# Patient Record
Sex: FEMALE | Race: BLACK OR AFRICAN AMERICAN | NOT HISPANIC OR LATINO | Employment: UNEMPLOYED | URBAN - METROPOLITAN AREA
[De-identification: names, ages, dates, MRNs, and addresses within clinical notes are randomized per-mention and may not be internally consistent; named-entity substitution may affect disease eponyms.]

---

## 2021-04-27 RX ORDER — VALACYCLOVIR HYDROCHLORIDE 1 G/1
1000 TABLET, FILM COATED ORAL 2 TIMES DAILY
COMMUNITY
End: 2021-10-05 | Stop reason: ALTCHOICE

## 2021-04-29 ENCOUNTER — ANNUAL EXAM (OUTPATIENT)
Dept: OBGYN CLINIC | Facility: CLINIC | Age: 36
End: 2021-04-29
Payer: COMMERCIAL

## 2021-04-29 VITALS
DIASTOLIC BLOOD PRESSURE: 74 MMHG | HEIGHT: 65 IN | WEIGHT: 180 LBS | SYSTOLIC BLOOD PRESSURE: 124 MMHG | BODY MASS INDEX: 29.99 KG/M2

## 2021-04-29 DIAGNOSIS — R10.2 PELVIC PAIN: ICD-10-CM

## 2021-04-29 DIAGNOSIS — B37.2 YEAST INFECTION OF THE SKIN: ICD-10-CM

## 2021-04-29 DIAGNOSIS — B96.89 BV (BACTERIAL VAGINOSIS): ICD-10-CM

## 2021-04-29 DIAGNOSIS — Z11.3 SCREEN FOR STD (SEXUALLY TRANSMITTED DISEASE): ICD-10-CM

## 2021-04-29 DIAGNOSIS — N76.0 BV (BACTERIAL VAGINOSIS): ICD-10-CM

## 2021-04-29 DIAGNOSIS — Z01.411 ENCOUNTER FOR GYNECOLOGICAL EXAMINATION WITH ABNORMAL FINDING: Primary | ICD-10-CM

## 2021-04-29 PROCEDURE — 99395 PREV VISIT EST AGE 18-39: CPT | Performed by: NURSE PRACTITIONER

## 2021-04-29 RX ORDER — METRONIDAZOLE 500 MG/1
500 TABLET ORAL EVERY 12 HOURS SCHEDULED
Qty: 14 TABLET | Refills: 3 | Status: SHIPPED | OUTPATIENT
Start: 2021-04-29 | End: 2021-05-06

## 2021-04-29 RX ORDER — NYSTATIN 100000 U/G
CREAM TOPICAL 2 TIMES DAILY
Qty: 30 G | Refills: 0 | Status: SHIPPED | OUTPATIENT
Start: 2021-04-29 | End: 2021-10-28 | Stop reason: ALTCHOICE

## 2021-04-29 NOTE — PROGRESS NOTES
Assessment/Plan:         Diagnoses and all orders for this visit:    Encounter for gynecological examination with abnormal finding  Comments:  Pt with c/o bilat pelvic pain on BM exam  will order US  Orders:  -     Chlamydia/GC amplified DNA by PCR    Pelvic pain  -     US pelvis complete w transvaginal; Future    Yeast infection of the skin  Comments:  under breasts bilat  Orders:  -     nystatin (MYCOSTATIN) cream; Apply topically 2 (two) times a day for 10 days    BV (bacterial vaginosis)  -     metroNIDAZOLE (FLAGYL) 500 mg tablet; Take 1 tablet (500 mg total) by mouth every 12 (twelve) hours for 7 days    Screen for STD (sexually transmitted disease)  -     Chlamydia/GC amplified DNA by PCR    Other orders  -     valACYclovir (VALTREX) 1,000 mg tablet; Take 1,000 mg by mouth 2 (two) times a day          Subjective:      Patient ID: Odalis Campos is a 37 y o  female  Here for annual gyn exam   No issues  Periods regular normal   SA same partner No birth control as ok with pregnancy  Denies abdominal or pelvic pain  Bowel and bladder are normal  Would like G/c  C/o Itchy rash under breasts which comes and goes  Using unmedicated powder  The following portions of the patient's history were reviewed and updated as appropriate: allergies, current medications, past family history, past medical history, past social history, past surgical history and problem list     Review of Systems   Constitutional: Negative for fatigue and unexpected weight change  Gastrointestinal: Negative for abdominal distention, abdominal pain, constipation and diarrhea  Genitourinary: Negative for difficulty urinating, dyspareunia, dysuria, frequency, genital sores, menstrual problem, pelvic pain, urgency, vaginal bleeding and vaginal discharge  Skin: Positive for rash (under breast bilat)  Hematological: Negative for adenopathy  Psychiatric/Behavioral: Negative  Negative for dysphoric mood   The patient is not nervous/anxious  Objective:      /74   Ht 5' 5" (1 651 m)   Wt 81 6 kg (180 lb)   LMP 04/16/2021   BMI 29 95 kg/m²          Physical Exam  Vitals signs and nursing note reviewed  Constitutional:       General: She is not in acute distress  Appearance: Normal appearance  HENT:      Head: Normocephalic and atraumatic  Pulmonary:      Effort: Pulmonary effort is normal    Chest:      Breasts: Breasts are symmetrical          Right: Normal  No mass, nipple discharge, skin change or tenderness  Left: Normal  No mass, nipple discharge, skin change or tenderness  Abdominal:      General: There is no distension  Palpations: Abdomen is soft  Tenderness: There is no abdominal tenderness  There is no guarding or rebound  Genitourinary:     Exam position: Lithotomy position  Labia:         Right: No rash, tenderness, lesion or injury  Left: No rash, tenderness, lesion or injury  Urethra: No prolapse, urethral pain, urethral swelling or urethral lesion  Vagina: Normal  No erythema or lesions  Cervix: No cervical motion tenderness, discharge, lesion or cervical bleeding  Uterus: Normal        Adnexa:         Right: Tenderness present  No mass  Left: Tenderness present  No mass  Rectum: No external hemorrhoid  Comments: G/C obtained Pelvic tenderness bilat with bimanual exam  Musculoskeletal: Normal range of motion  Lymphadenopathy:      Upper Body:      Right upper body: No axillary adenopathy  Left upper body: No axillary adenopathy  Lower Body: No right inguinal adenopathy  No left inguinal adenopathy  Skin:     General: Skin is warm and dry  Neurological:      Mental Status: She is alert and oriented to person, place, and time  Psychiatric:         Mood and Affect: Mood normal          Behavior: Behavior normal          Thought Content:  Thought content normal          Judgment: Judgment normal

## 2021-05-01 LAB
C TRACH RRNA SPEC QL NAA+PROBE: NEGATIVE
N GONORRHOEA RRNA SPEC QL NAA+PROBE: NEGATIVE

## 2021-05-02 PROBLEM — N76.0 BV (BACTERIAL VAGINOSIS): Status: ACTIVE | Noted: 2021-05-02

## 2021-05-02 PROBLEM — B96.89 BV (BACTERIAL VAGINOSIS): Status: ACTIVE | Noted: 2021-05-02

## 2021-05-02 NOTE — PATIENT INSTRUCTIONS
H/o recurrent BV requested Flagyl to have on hand  Rx sent to requested pharmacy  No sex during treatment  Complete all medication as directed  Consider daily probiotic  Bilat tenderness on BM exam  Will check US to r/o cyst call with cont or worsening sx  Yeast infection under breasts  Rec dry area well  Dry with blow dryer on cool stteing then apply nystatin bid  Consider antiperspirant daily in AM under breasts to decrease sweating

## 2021-07-16 ENCOUNTER — TELEPHONE (OUTPATIENT)
Dept: OBGYN CLINIC | Facility: CLINIC | Age: 36
End: 2021-07-16

## 2021-07-16 DIAGNOSIS — O20.9 BLEEDING IN EARLY PREGNANCY: Primary | ICD-10-CM

## 2021-07-16 NOTE — TELEPHONE ENCOUNTER
Patient called the emergency line stating she is currently pregnant (LMP 21; ; currently 5 wks; +UPT at [facility] 07/15/21)  She noticed brown spotting yesterday and then today bright red bleeding (not heavy) enough to notice on a pad with tiny clots  She denies abd pain/cramping  She believe she having a miscarriage and unsure to wait it out or come in to be seen  Advised pt will call on-call provider Dr Jillian Moss for recommendations and will call her back  Spoke with Dr Jillian Moss and she recommends beta HCGs one today and repeat in 48 hours as well as blood typing  Called patient and she states she uses [facility]  Order placed and transmitted to [facility] per pt request  Provided pt ectopic precautions and if heavy bleeding or severe abd pain/cramp to proceed to ER  She states after this nurse review ectopic precaution she did had a history of 1 ectopic before and 1 termination previously  She verbalized understanding with the recommendations

## 2021-07-17 LAB
ABO GROUP BLD: NORMAL
HCG INTACT+B SERPL-ACNC: 66 MIU/ML
RH BLD: POSITIVE

## 2021-07-19 NOTE — TELEPHONE ENCOUNTER
Patient called today to follow-up as she end up going to [facility] ER on Friday night  The ER did ultrasound and blood work and diagnosed her with missed ab and instructed her to follow-up with her OBGYN today  She denies cramping and the bleeding stopped  She was instructed to repeat HCG repeat in 48 hours which [facility] [address] her depending on the results will call her with the next plan of care  If her bleeding returns and heavy or severe abd pain to please proceed back to the ER  She voiced appreciation  ER records rec'd and sent to scan into patient's chart

## 2021-07-20 DIAGNOSIS — O20.9 BLEEDING IN EARLY PREGNANCY: Primary | ICD-10-CM

## 2021-07-20 LAB — HCG INTACT+B SERPL-ACNC: 28 MIU/ML

## 2021-07-20 NOTE — PROGRESS NOTES
Please check repeated HCG lab encounter   Order for another HCG to be repeated transmitted to [facility]

## 2021-07-29 LAB — HCG INTACT+B SERPL-ACNC: <1 MIU/ML

## 2021-10-05 ENCOUNTER — OFFICE VISIT (OUTPATIENT)
Dept: OBGYN CLINIC | Facility: CLINIC | Age: 36
End: 2021-10-05
Payer: COMMERCIAL

## 2021-10-05 VITALS
SYSTOLIC BLOOD PRESSURE: 102 MMHG | WEIGHT: 179 LBS | BODY MASS INDEX: 29.82 KG/M2 | DIASTOLIC BLOOD PRESSURE: 62 MMHG | HEIGHT: 65 IN

## 2021-10-05 DIAGNOSIS — Z20.2 POSSIBLE EXPOSURE TO STD: ICD-10-CM

## 2021-10-05 DIAGNOSIS — N93.0 PCB (POST COITAL BLEEDING): Primary | ICD-10-CM

## 2021-10-05 DIAGNOSIS — B37.3 YEAST VAGINITIS: ICD-10-CM

## 2021-10-05 DIAGNOSIS — R10.2 PELVIC PAIN: ICD-10-CM

## 2021-10-05 LAB
BV WHIFF TEST VAG QL: ABNORMAL
CLUE CELLS SPEC QL WET PREP: ABNORMAL
PH SMN: 5 [PH]
SL AMB POCT WET MOUNT: ABNORMAL
T VAGINALIS VAG QL WET PREP: ABNORMAL
YEAST VAG QL WET PREP: ABNORMAL

## 2021-10-05 PROCEDURE — 87210 SMEAR WET MOUNT SALINE/INK: CPT | Performed by: NURSE PRACTITIONER

## 2021-10-05 PROCEDURE — 99213 OFFICE O/P EST LOW 20 MIN: CPT | Performed by: NURSE PRACTITIONER

## 2021-10-05 RX ORDER — FLUCONAZOLE 150 MG/1
150 TABLET ORAL ONCE
Qty: 2 TABLET | Refills: 0 | Status: SHIPPED | OUTPATIENT
Start: 2021-10-05 | End: 2021-10-05

## 2021-10-08 LAB
C TRACH RRNA SPEC QL NAA+PROBE: NEGATIVE
N GONORRHOEA RRNA SPEC QL NAA+PROBE: NEGATIVE
T VAGINALIS RRNA SPEC QL NAA+PROBE: NEGATIVE

## 2021-10-20 ENCOUNTER — TELEPHONE (OUTPATIENT)
Dept: OBGYN CLINIC | Facility: CLINIC | Age: 36
End: 2021-10-20

## 2021-10-28 ENCOUNTER — OFFICE VISIT (OUTPATIENT)
Dept: OBGYN CLINIC | Facility: CLINIC | Age: 36
End: 2021-10-28
Payer: COMMERCIAL

## 2021-10-28 VITALS
WEIGHT: 178 LBS | DIASTOLIC BLOOD PRESSURE: 60 MMHG | BODY MASS INDEX: 30.39 KG/M2 | SYSTOLIC BLOOD PRESSURE: 100 MMHG | HEIGHT: 64 IN

## 2021-10-28 DIAGNOSIS — Z3A.01 LESS THAN 8 WEEKS GESTATION OF PREGNANCY: Primary | ICD-10-CM

## 2021-10-28 DIAGNOSIS — Z36.87 UNSURE OF LMP (LAST MENSTRUAL PERIOD) AS REASON FOR ULTRASOUND SCAN: ICD-10-CM

## 2021-10-28 PROCEDURE — 99214 OFFICE O/P EST MOD 30 MIN: CPT | Performed by: OBSTETRICS & GYNECOLOGY

## 2021-10-28 PROCEDURE — 76817 TRANSVAGINAL US OBSTETRIC: CPT | Performed by: OBSTETRICS & GYNECOLOGY

## 2021-11-10 ENCOUNTER — INITIAL PRENATAL (OUTPATIENT)
Dept: OBGYN CLINIC | Facility: CLINIC | Age: 36
End: 2021-11-10
Payer: COMMERCIAL

## 2021-11-10 VITALS
HEIGHT: 65 IN | WEIGHT: 180 LBS | SYSTOLIC BLOOD PRESSURE: 108 MMHG | DIASTOLIC BLOOD PRESSURE: 62 MMHG | BODY MASS INDEX: 29.99 KG/M2

## 2021-11-10 DIAGNOSIS — Z34.91 FIRST TRIMESTER PREGNANCY: Primary | ICD-10-CM

## 2021-11-10 DIAGNOSIS — Z3A.08 8 WEEKS GESTATION OF PREGNANCY: ICD-10-CM

## 2021-11-10 DIAGNOSIS — Z13.0 SCREENING FOR SICKLE-CELL DISEASE OR TRAIT: ICD-10-CM

## 2021-11-10 DIAGNOSIS — Z3A.08 8 WEEKS GESTATION OF PREGNANCY: Primary | ICD-10-CM

## 2021-11-10 DIAGNOSIS — O34.211 MATERNAL CARE DUE TO LOW TRANSVERSE UTERINE SCAR FROM PREVIOUS CESAREAN DELIVERY: ICD-10-CM

## 2021-11-10 DIAGNOSIS — B00.9 HERPES SIMPLEX: ICD-10-CM

## 2021-11-10 PROBLEM — Z3A.01 LESS THAN 8 WEEKS GESTATION OF PREGNANCY: Status: ACTIVE | Noted: 2021-10-28

## 2021-11-10 LAB
SL AMB  POCT GLUCOSE, UA: NORMAL
SL AMB POCT URINE PROTEIN: NORMAL

## 2021-11-10 PROCEDURE — 76817 TRANSVAGINAL US OBSTETRIC: CPT | Performed by: STUDENT IN AN ORGANIZED HEALTH CARE EDUCATION/TRAINING PROGRAM

## 2021-11-10 PROCEDURE — 99214 OFFICE O/P EST MOD 30 MIN: CPT | Performed by: STUDENT IN AN ORGANIZED HEALTH CARE EDUCATION/TRAINING PROGRAM

## 2021-11-10 PROCEDURE — OBC: Performed by: STUDENT IN AN ORGANIZED HEALTH CARE EDUCATION/TRAINING PROGRAM

## 2021-11-12 LAB
C TRACH RRNA SPEC QL NAA+PROBE: NEGATIVE
N GONORRHOEA RRNA SPEC QL NAA+PROBE: NEGATIVE

## 2021-12-09 ENCOUNTER — ROUTINE PRENATAL (OUTPATIENT)
Dept: OBGYN CLINIC | Facility: CLINIC | Age: 36
End: 2021-12-09
Payer: COMMERCIAL

## 2021-12-09 VITALS
BODY MASS INDEX: 30.32 KG/M2 | WEIGHT: 182 LBS | DIASTOLIC BLOOD PRESSURE: 74 MMHG | SYSTOLIC BLOOD PRESSURE: 114 MMHG | HEIGHT: 65 IN

## 2021-12-09 DIAGNOSIS — O34.211 MATERNAL CARE DUE TO LOW TRANSVERSE UTERINE SCAR FROM PREVIOUS CESAREAN DELIVERY: Primary | ICD-10-CM

## 2021-12-09 DIAGNOSIS — Z3A.12 12 WEEKS GESTATION OF PREGNANCY: ICD-10-CM

## 2021-12-09 LAB
SL AMB  POCT GLUCOSE, UA: ABNORMAL
SL AMB POCT URINE PROTEIN: ABNORMAL

## 2021-12-09 PROCEDURE — 99213 OFFICE O/P EST LOW 20 MIN: CPT | Performed by: OBSTETRICS & GYNECOLOGY

## 2021-12-10 ENCOUNTER — ROUTINE PRENATAL (OUTPATIENT)
Dept: PERINATAL CARE | Facility: OTHER | Age: 36
End: 2021-12-10
Payer: COMMERCIAL

## 2021-12-10 VITALS
SYSTOLIC BLOOD PRESSURE: 122 MMHG | DIASTOLIC BLOOD PRESSURE: 61 MMHG | WEIGHT: 184.6 LBS | HEIGHT: 65 IN | BODY MASS INDEX: 30.75 KG/M2 | HEART RATE: 88 BPM

## 2021-12-10 DIAGNOSIS — Z3A.12 12 WEEKS GESTATION OF PREGNANCY: ICD-10-CM

## 2021-12-10 DIAGNOSIS — Z36.82 ENCOUNTER FOR NUCHAL TRANSLUCENCY TESTING: Primary | ICD-10-CM

## 2021-12-10 DIAGNOSIS — Z34.91 FIRST TRIMESTER PREGNANCY: ICD-10-CM

## 2021-12-10 DIAGNOSIS — O34.211 MATERNAL CARE DUE TO LOW TRANSVERSE UTERINE SCAR FROM PREVIOUS CESAREAN DELIVERY: ICD-10-CM

## 2021-12-10 PROCEDURE — 76801 OB US < 14 WKS SINGLE FETUS: CPT | Performed by: OBSTETRICS & GYNECOLOGY

## 2021-12-10 PROCEDURE — 76813 OB US NUCHAL MEAS 1 GEST: CPT | Performed by: OBSTETRICS & GYNECOLOGY

## 2021-12-10 PROCEDURE — 99241 PR OFFICE CONSULTATION NEW/ESTAB PATIENT 15 MIN: CPT | Performed by: OBSTETRICS & GYNECOLOGY

## 2021-12-15 ENCOUNTER — TELEPHONE (OUTPATIENT)
Dept: PERINATAL CARE | Facility: CLINIC | Age: 36
End: 2021-12-15

## 2021-12-15 LAB
EXTERNAL ANTIBODY SCREEN: NORMAL
EXTERNAL HEMOGLOBIN: 9.7 G/DL
EXTERNAL HEPATITIS B SURFACE ANTIGEN: NEGATIVE
EXTERNAL HIV-1/2 AB-AG: NORMAL
EXTERNAL PLATELET COUNT: 275 K/ΜL
EXTERNAL RH FACTOR: POSITIVE
EXTERNAL RUBELLA IGG QUANTITATION: NORMAL
EXTERNAL SYPHILIS RPR SCREEN: NORMAL

## 2021-12-16 LAB
AMPHETAMINES UR QL SCN: NEGATIVE NG/ML
BARBITURATES UR QL SCN: NEGATIVE NG/ML
BENZODIAZ UR QL: NEGATIVE NG/ML
BZE UR QL: NEGATIVE NG/ML
CANNABINOIDS UR QL SCN: NEGATIVE NG/ML
METHADONE UR QL SCN: NEGATIVE NG/ML
OPIATES UR QL: NEGATIVE NG/ML
PCP UR QL: NEGATIVE NG/ML
PROPOXYPH UR QL SCN: NEGATIVE NG/ML

## 2021-12-17 ENCOUNTER — TELEPHONE (OUTPATIENT)
Dept: OBGYN CLINIC | Facility: CLINIC | Age: 36
End: 2021-12-17

## 2021-12-17 PROBLEM — O99.019 ANTEPARTUM ANEMIA: Status: ACTIVE | Noted: 2021-12-17

## 2021-12-17 LAB
ABO GROUP BLD: ABNORMAL
APPEARANCE UR: CLEAR
BACTERIA UR CULT: NORMAL
BACTERIA URNS QL MICRO: ABNORMAL
BASOPHILS # BLD AUTO: 0 X10E3/UL (ref 0–0.2)
BASOPHILS NFR BLD AUTO: 0 %
BILIRUB UR QL STRIP: NEGATIVE
BLD GP AB SCN SERPL QL: NEGATIVE
CASTS URNS QL MICRO: ABNORMAL /LPF
COLOR UR: YELLOW
EOSINOPHIL # BLD AUTO: 0 X10E3/UL (ref 0–0.4)
EOSINOPHIL NFR BLD AUTO: 1 %
EPI CELLS #/AREA URNS HPF: ABNORMAL /HPF (ref 0–10)
ERYTHROCYTE [DISTWIDTH] IN BLOOD BY AUTOMATED COUNT: 12.7 % (ref 11.7–15.4)
GLUCOSE UR QL: ABNORMAL
HBV SURFACE AG SERPL QL IA: NEGATIVE
HCT VFR BLD AUTO: 28 % (ref 34–46.6)
HCV AB S/CO SERPL IA: <0.1 S/CO RATIO (ref 0–0.9)
HGB A MFR BLD: 2.4 % (ref 1.8–3.2)
HGB A MFR BLD: 97.6 % (ref 96.4–98.8)
HGB BLD-MCNC: 9.7 G/DL (ref 11.1–15.9)
HGB F MFR BLD: 0 % (ref 0–2)
HGB FRACT BLD-IMP: NORMAL
HGB S MFR BLD: 0 %
HGB UR QL STRIP: NEGATIVE
HIV 1+2 AB+HIV1 P24 AG SERPL QL IA: NON REACTIVE
IMM GRANULOCYTES # BLD: 0.1 X10E3/UL (ref 0–0.1)
IMM GRANULOCYTES NFR BLD: 1 %
KETONES UR QL STRIP: NEGATIVE
LEUKOCYTE ESTERASE UR QL STRIP: ABNORMAL
LYMPHOCYTES # BLD AUTO: 1.6 X10E3/UL (ref 0.7–3.1)
LYMPHOCYTES NFR BLD AUTO: 25 %
Lab: NO GROWTH
MCH RBC QN AUTO: 28.2 PG (ref 26.6–33)
MCHC RBC AUTO-ENTMCNC: 34.6 G/DL (ref 31.5–35.7)
MCV RBC AUTO: 81 FL (ref 79–97)
MICRO URNS: ABNORMAL
MONOCYTES # BLD AUTO: 0.4 X10E3/UL (ref 0.1–0.9)
MONOCYTES NFR BLD AUTO: 6 %
NEUTROPHILS # BLD AUTO: 4.4 X10E3/UL (ref 1.4–7)
NEUTROPHILS NFR BLD AUTO: 67 %
NITRITE UR QL STRIP: NEGATIVE
PH UR STRIP: 6.5 [PH] (ref 5–7.5)
PLATELET # BLD AUTO: 275 X10E3/UL (ref 150–450)
PROT UR QL STRIP: NEGATIVE
RBC # BLD AUTO: 3.44 X10E6/UL (ref 3.77–5.28)
RBC #/AREA URNS HPF: ABNORMAL /HPF (ref 0–2)
RH BLD: POSITIVE
RPR SER QL: NON REACTIVE
RUBV IGG SERPL IA-ACNC: 1.19 INDEX
SP GR UR: 1.01 (ref 1–1.03)
UROBILINOGEN UR STRIP-ACNC: 0.2 MG/DL (ref 0.2–1)
WBC # BLD AUTO: 6.5 X10E3/UL (ref 3.4–10.8)
WBC #/AREA URNS HPF: ABNORMAL /HPF (ref 0–5)

## 2021-12-22 ENCOUNTER — ROUTINE PRENATAL (OUTPATIENT)
Dept: OBGYN CLINIC | Facility: CLINIC | Age: 36
End: 2021-12-22
Payer: COMMERCIAL

## 2021-12-22 ENCOUNTER — TELEPHONE (OUTPATIENT)
Dept: OBGYN CLINIC | Facility: CLINIC | Age: 36
End: 2021-12-22

## 2021-12-22 VITALS — SYSTOLIC BLOOD PRESSURE: 126 MMHG | WEIGHT: 185 LBS | BODY MASS INDEX: 30.79 KG/M2 | DIASTOLIC BLOOD PRESSURE: 60 MMHG

## 2021-12-22 DIAGNOSIS — Z3A.14 14 WEEKS GESTATION OF PREGNANCY: ICD-10-CM

## 2021-12-22 DIAGNOSIS — O99.012 ANEMIA DURING PREGNANCY IN SECOND TRIMESTER: ICD-10-CM

## 2021-12-22 DIAGNOSIS — O34.211 MATERNAL CARE DUE TO LOW TRANSVERSE UTERINE SCAR FROM PREVIOUS CESAREAN DELIVERY: Primary | ICD-10-CM

## 2021-12-22 LAB
SL AMB  POCT GLUCOSE, UA: ABNORMAL
SL AMB POCT URINE PROTEIN: NEGATIVE

## 2021-12-22 PROCEDURE — 99213 OFFICE O/P EST LOW 20 MIN: CPT | Performed by: OBSTETRICS & GYNECOLOGY

## 2021-12-22 RX ORDER — PNV NO.95/FERROUS FUM/FOLIC AC 28MG-0.8MG
TABLET ORAL
COMMUNITY
End: 2022-05-03

## 2022-01-11 ENCOUNTER — ROUTINE PRENATAL (OUTPATIENT)
Dept: OBGYN CLINIC | Facility: CLINIC | Age: 37
End: 2022-01-11
Payer: COMMERCIAL

## 2022-01-11 VITALS — WEIGHT: 183 LBS | SYSTOLIC BLOOD PRESSURE: 110 MMHG | BODY MASS INDEX: 30.45 KG/M2 | DIASTOLIC BLOOD PRESSURE: 62 MMHG

## 2022-01-11 DIAGNOSIS — O99.019 ANTEPARTUM ANEMIA: ICD-10-CM

## 2022-01-11 DIAGNOSIS — B00.9 HERPES SIMPLEX: ICD-10-CM

## 2022-01-11 DIAGNOSIS — Z36.1 NEED FOR MATERNAL SERUM ALPHA-PROTEIN (MSAFP) SCREENING: ICD-10-CM

## 2022-01-11 DIAGNOSIS — Z3A.17 17 WEEKS GESTATION OF PREGNANCY: ICD-10-CM

## 2022-01-11 DIAGNOSIS — Z71.85 VACCINE COUNSELING: ICD-10-CM

## 2022-01-11 DIAGNOSIS — O34.211 MATERNAL CARE DUE TO LOW TRANSVERSE UTERINE SCAR FROM PREVIOUS CESAREAN DELIVERY: Primary | ICD-10-CM

## 2022-01-11 DIAGNOSIS — Z98.891 HISTORY OF VBAC: ICD-10-CM

## 2022-01-11 LAB
SL AMB  POCT GLUCOSE, UA: NEGATIVE
SL AMB POCT URINE PROTEIN: NEGATIVE

## 2022-01-11 PROCEDURE — 99213 OFFICE O/P EST LOW 20 MIN: CPT | Performed by: OBSTETRICS & GYNECOLOGY

## 2022-01-11 NOTE — PROGRESS NOTES
Routine Prenatal Visit  [address], [address], [facility], [address]    Assessment/Plan:  Jose L Stacy is a 31y o  year old [id number] at 17w3d who presents for routine prenatal visit  1  Maternal care due to low transverse uterine scar from previous  delivery  Assessment & Plan:  H/o successful , planning SHAHRZAD this delivery as well      2  Herpes simplex  Assessment & Plan:  Questionable h/o outbreak  Recom Valtrex at 36 weeks for suppression just to be sure  3  Antepartum anemia  Assessment & Plan:  Taking po iron      4  Need for maternal serum alpha-protein (MSAFP) screening  -     Alpha fetoprotein, maternal; Future  -     Alpha fetoprotein, maternal    5  17 weeks gestation of pregnancy  -     POCT urine dip    6  History of     7  Vaccine counseling  Comments:  received Moderna Covid vaccine , has 2nd scheduled       Next OB Visit 4 weeks  Subjective:     CC: Prenatal care    Debra Sahni is a 31 y o  [id number] female who presents for routine prenatal care at 17w3d  Pregnancy ROS: no leakage of fluid, pelvic pain, or vaginal bleeding  normal fetal movement      The following portions of the patient's history were reviewed and updated as appropriate: allergies, current medications, past family history, past medical history, obstetric history, gynecologic history, past social history, past surgical history and problem list       Objective:  /62   Wt 83 kg (183 lb)   LMP 2021 (Exact Date)   BMI 30 45 kg/m²   Pregravid Weight/BMI: 80 7 kg (178 lb) (BMI 29 62)  Current Weight: 83 kg (183 lb)   Total Weight Gain: 2 268 kg (5 lb)   Pre-Jade Vitals      Most Recent Value   Prenatal Assessment    Fetal Heart Rate 155   Fundal Height (cm) 17 cm   Movement Present   Prenatal Vitals    Blood Pressure 110/62   Weight - Scale 83 kg (183 lb)   Urine Albumin/Glucose    Dilation/Effacement/Station    Vaginal Drainage    Edema    LLE Edema None   RLE Edema None           General: Well appearing, no distress  Abdomen: Soft, gravid, nontender  Fundal Height: Appropriate for gestational age  Extremities: Warm and well perfused  Non tender

## 2022-01-31 ENCOUNTER — ROUTINE PRENATAL (OUTPATIENT)
Dept: PERINATAL CARE | Facility: OTHER | Age: 37
End: 2022-01-31
Payer: COMMERCIAL

## 2022-01-31 VITALS
BODY MASS INDEX: 31.59 KG/M2 | SYSTOLIC BLOOD PRESSURE: 122 MMHG | HEIGHT: 65 IN | WEIGHT: 189.6 LBS | HEART RATE: 95 BPM | DIASTOLIC BLOOD PRESSURE: 69 MMHG

## 2022-01-31 DIAGNOSIS — Z36.86 ENCOUNTER FOR ANTENATAL SCREENING FOR CERVICAL LENGTH: ICD-10-CM

## 2022-01-31 DIAGNOSIS — O99.210 OBESITY AFFECTING PREGNANCY, ANTEPARTUM: Primary | ICD-10-CM

## 2022-01-31 DIAGNOSIS — Z3A.20 20 WEEKS GESTATION OF PREGNANCY: ICD-10-CM

## 2022-01-31 PROCEDURE — 76811 OB US DETAILED SNGL FETUS: CPT | Performed by: OBSTETRICS & GYNECOLOGY

## 2022-01-31 PROCEDURE — 76817 TRANSVAGINAL US OBSTETRIC: CPT | Performed by: OBSTETRICS & GYNECOLOGY

## 2022-01-31 PROCEDURE — 99213 OFFICE O/P EST LOW 20 MIN: CPT | Performed by: OBSTETRICS & GYNECOLOGY

## 2022-01-31 NOTE — PROGRESS NOTES
The patient was seen today for an ultrasound  Please see ultrasound report (located under Ob Procedures) for additional details  Thank you very much for allowing us to participate in the care of this very nice patient  Should you have any questions, please do not hesitate to contact me  MD [place]  Attending Physician, [place]hem[place]

## 2022-01-31 NOTE — PROGRESS NOTES
Ultrasound Probe Disinfection    A transvaginal ultrasound was performed  Prior to use, disinfection was performed with High Level Disinfection Process ([employer])  Probe serial number [id number] was used        Garrett Light  01/31/22  10:51 AM

## 2022-02-02 PROBLEM — O99.012 ANEMIA DURING PREGNANCY IN SECOND TRIMESTER: Status: ACTIVE | Noted: 2021-12-17

## 2022-02-02 NOTE — PROGRESS NOTES
Routine Prenatal Visit  [address] [facility], [address], [facility], [address]    Assessment/Plan:  Jet Nash is a 31y o  year old [id number] at 14w4d who presents for routine prenatal visit  1  Maternal care due to low transverse uterine scar from previous  delivery    2  Anemia during pregnancy in second trimester    3  14 weeks gestation of pregnancy  -     POCT urine dip          Subjective:     CC: Prenatal care    Clay Gary is a 31 y o  [id number] female who presents for routine prenatal care at 14w4d  Pregnancy ROS: no leakage of fluid, pelvic pain, or vaginal bleeding  no fetal movement  The following portions of the patient's history were reviewed and updated as appropriate: allergies, current medications, past family history, past medical history, obstetric history, gynecologic history, past social history, past surgical history and problem list       Objective:  /60   Wt 83 9 kg (185 lb)   LMP 2021 (Exact Date)   BMI 30 79 kg/m²   Pregravid Weight/BMI: 80 7 kg (178 lb) (BMI 29 62)  Current Weight: 83 9 kg (185 lb)   Total Weight Gain: 3 175 kg (7 lb)   Pre- Vitals      Most Recent Value   Prenatal Assessment    Fetal Heart Rate 150   Fundal Height (cm) 15 cm   Movement Absent   Prenatal Vitals    Blood Pressure 126/60   Weight - Scale 83 9 kg (185 lb)   Urine Albumin/Glucose    Dilation/Effacement/Station    Vaginal Drainage    Edema            General: Well appearing, no distress  Respiratory: Unlabored breathing  Cardiovascular: Regular rate  Abdomen: Soft, gravid, nontender  Fundal Height: Appropriate for gestational age  Extremities: Warm and well perfused  Non tender

## 2022-02-08 ENCOUNTER — ROUTINE PRENATAL (OUTPATIENT)
Dept: OBGYN CLINIC | Facility: CLINIC | Age: 37
End: 2022-02-08
Payer: COMMERCIAL

## 2022-02-08 VITALS — DIASTOLIC BLOOD PRESSURE: 48 MMHG | WEIGHT: 187.2 LBS | SYSTOLIC BLOOD PRESSURE: 100 MMHG | BODY MASS INDEX: 31.15 KG/M2

## 2022-02-08 DIAGNOSIS — B00.9 HERPES SIMPLEX: ICD-10-CM

## 2022-02-08 DIAGNOSIS — O34.211 MATERNAL CARE DUE TO LOW TRANSVERSE UTERINE SCAR FROM PREVIOUS CESAREAN DELIVERY: ICD-10-CM

## 2022-02-08 DIAGNOSIS — Z3A.21 21 WEEKS GESTATION OF PREGNANCY: ICD-10-CM

## 2022-02-08 DIAGNOSIS — Z98.891 HISTORY OF VBAC: ICD-10-CM

## 2022-02-08 DIAGNOSIS — O99.012 ANEMIA DURING PREGNANCY IN SECOND TRIMESTER: Primary | ICD-10-CM

## 2022-02-08 LAB
SL AMB  POCT GLUCOSE, UA: NEGATIVE
SL AMB POCT URINE PROTEIN: NEGATIVE

## 2022-02-08 PROCEDURE — 99213 OFFICE O/P EST LOW 20 MIN: CPT | Performed by: OBSTETRICS & GYNECOLOGY

## 2022-02-08 NOTE — PATIENT INSTRUCTIONS
NUTRITION IN PREGNANCY  Good Nutrition is a VERY important part of having a healthy pregnancy and healthy baby  You should follow a healthy diet which include the following: * Vegetables (which are dark green and leafy): at least 2 servings each day   * Protein (meat, eggs, beans, nuts, peanut butter): 3-4 servings each day   * Breads/whole grains (bread, pasta, rice, tortillas, potatoes): 3 servings each day   * Dairy (milk, yogurt, cheese): 3-4 servings each day   * Water: 6-8 glasses per day   * Calories: approximately 2000 to 2200 calories per day     WEIGHT GAIN   Recommended weight gain for you during your pregnancy is based on your body mass index (BMI) at the time that you became pregnant  Pre-pregnant BMI Recommended weight gain   Underweight (BMI less than 18 5) 28 to 40 pounds   Normal weight (BMI 18 5-24 9) 25 to 35 pounds   Overweight (BMI 25-29 9) 15 to 25 pounds   Obese (BMI 30 or greater) 11 to 20 pounds     FOOD SAFETY   It is VERY important to eat only safely-prepared foods during pregnancy as you and your baby have a higher risk than usual for being affected by foodborne illnesses    Follow these steps to ensure that you and your baby are safe from foodborne illnesses while you are pregnant:   · wash hands thoroughly with warm water and soap before and after handling any foods   · wash cutting boards, dishes, utensils, and countertops with hot water and soap before and after preparing any foods   · rinse raw fruits and vegetables thoroughly under running water before eating   · keep raw meat and seafood separate from other foods and use different cutting boards/utensils to handle raw meat than for other foods   · put cooked food on a freshly clean plate   · cook all of your foods thoroughly   · discard foods that have been left out for more than 2 hours   · refrigerate or freeze any foods than can spoil     There are three particular foodborne risks that you should be aware of and avoid as they can cause serious harm to your unborn child  * Listeria (a harmful bacteria)   · dont eat hot dogs or deli meats (unless theyre reheated until steaming hot)   · dont eat soft cheeses (such as Feta, Brie, Camembert) unless they are specifically labeled as being made with pasteurized milk   · dont drink raw (unpasteurized) milk   · dont eat refrigerated pates or meat spreads   · dont eat refrigerated smoked seafood unless its in a cooked dish like a casserole     * Mercury (a metal which is found in certain fish in high levels)   · dont eat shark, tilefish, kelsey mackerel, or swordfish   · dont eat more than 12 ounces per week of shrimp, salmon, pollock, or catfish   · when eating tuna fish, you can have up to 6 ounces per week of canned albacore tuna OR up to 12 ounces of canned light tuna     * Toxoplasma (a harmful parasite)   · cook meat thoroughly before eating   · wear gloves when gardening or handling sand from a childs sandbox   · if you ha tve a cat, have someone else change the litter box while you are pregnant  · if you HAVE to clean it yourself, be sure to wash your hands thoroughly afterwards with warm water and soap     · dont get a NEW cat while you are pregnant

## 2022-02-08 NOTE — PROGRESS NOTES
Routine Prenatal Visit  [address], [address], [facility], [address] [facility] [facility] [address]    Assessment/Plan:  Deshawn Godinez is a 31y o  year old [id number] at 21w3d who presents for routine prenatal visit  1  21 weeks gestation of pregnancy  -     POCT urine dip    2  Anemia during pregnancy in second trimester    3  Maternal care due to low transverse uterine scar from previous  delivery    4  Herpes simplex    5  History of       + fm  No  UTCX denies  Bleeding    + using one Iron tablet a day  No constipation  20 week  US reviewed  AGA  Repeat at  32 weeks for missed anatomy  Subjective:     CC: Prenatal care    Debra Mcclure is a 31 y o  [id number] female who presents for routine prenatal care at 21w3d  Pregnancy ROS: no  leakage of fluid, pelvic pain, or vaginal bleeding   + fetal movement  The following portions of the patient's history were reviewed and updated as appropriate: allergies, current medications, past family history, past medical history, obstetric history, gynecologic history, past social history, past surgical history and problem list       Objective:  BP (!) 100/48   Wt 84 9 kg (187 lb 3 2 oz)   LMP 2021 (Exact Date)   BMI 31 15 kg/m²   Pregravid Weight/BMI: 80 7 kg (178 lb) (BMI 29 62)  Current Weight: 84 9 kg (187 lb 3 2 oz)   Total Weight Gain: 4 173 kg (9 lb 3 2 oz)   Pre- Vitals      Most Recent Value   Prenatal Assessment    Movement Present   Prenatal Vitals    Blood Pressure 100/48   Weight - Scale 84 9 kg (187 lb 3 2 oz)   Urine Albumin/Glucose    Dilation/Effacement/Station    Vaginal Drainage    Edema            General: Well appearing, no distress  Respiratory: Unlabored breathing  Cardiovascular: Regular rate  Abdomen: Soft, gravid, nontender  Fundal Height: Appropriate for gestational age  Extremities: Warm and well perfused  Non tender

## 2022-03-07 ENCOUNTER — ROUTINE PRENATAL (OUTPATIENT)
Dept: OBGYN CLINIC | Facility: CLINIC | Age: 37
End: 2022-03-07
Payer: COMMERCIAL

## 2022-03-07 VITALS
WEIGHT: 187 LBS | DIASTOLIC BLOOD PRESSURE: 60 MMHG | HEIGHT: 65 IN | SYSTOLIC BLOOD PRESSURE: 110 MMHG | BODY MASS INDEX: 31.16 KG/M2

## 2022-03-07 DIAGNOSIS — Z98.891 HISTORY OF VBAC: ICD-10-CM

## 2022-03-07 DIAGNOSIS — O34.211 MATERNAL CARE DUE TO LOW TRANSVERSE UTERINE SCAR FROM PREVIOUS CESAREAN DELIVERY: ICD-10-CM

## 2022-03-07 DIAGNOSIS — Z3A.25 25 WEEKS GESTATION OF PREGNANCY: ICD-10-CM

## 2022-03-07 DIAGNOSIS — Z36.89 ENCOUNTER FOR OTHER SPECIFIED ANTENATAL SCREENING: Primary | ICD-10-CM

## 2022-03-07 DIAGNOSIS — O99.012 ANEMIA DURING PREGNANCY IN SECOND TRIMESTER: ICD-10-CM

## 2022-03-07 LAB
SL AMB  POCT GLUCOSE, UA: NORMAL
SL AMB POCT URINE PROTEIN: NORMAL

## 2022-03-07 PROCEDURE — 99213 OFFICE O/P EST LOW 20 MIN: CPT | Performed by: OBSTETRICS & GYNECOLOGY

## 2022-03-07 RX ORDER — KETOCONAZOLE 20 MG/ML
SHAMPOO TOPICAL
COMMUNITY
Start: 2022-02-17

## 2022-03-07 NOTE — PROGRESS NOTES
Routine Prenatal Visit  [address], [address], [facility], [address]    Assessment/Plan:  Kenn Jenkins is a 31y o  year old [id number] at 25w2d who presents for routine prenatal visit  1  Encounter for other specified  screening  -     Glucose, 1H PG; Future  -     CBC; Future  -     RPR, Rfx Qn RPR/Confirm TP; Future  -     Glucose, 1H PG  -     CBC  -     RPR, Rfx Qn RPR/Confirm TP    2  25 weeks gestation of pregnancy  -     POCT urine dip    3  History of     4  Anemia during pregnancy in second trimester    5  Maternal care due to low transverse uterine scar from previous  delivery        Next OB Visit 4 weeks  Subjective:     CC: Prenatal care    Debra Patel is a 31 y o  [id number] female who presents for routine prenatal care at 25w2d  Pregnancy ROS: no leakage of fluid, pelvic pain, or vaginal bleeding  nml fetal movement      The following portions of the patient's history were reviewed and updated as appropriate: allergies, current medications, past family history, past medical history, obstetric history, gynecologic history, past social history, past surgical history and problem list       Objective:  /60 (BP Location: Left arm, Patient Position: Sitting, Cuff Size: Standard)   Ht 5' 5" (1 651 m)   Wt 84 8 kg (187 lb)   LMP 2021 (Exact Date)   BMI 31 12 kg/m²   Pregravid Weight/BMI: 80 7 kg (178 lb) (BMI 29 62)  Current Weight: 84 8 kg (187 lb)   Total Weight Gain: 4 082 kg (9 lb)   Pre-Jade Vitals      Most Recent Value   Prenatal Assessment    Fetal Heart Rate 150   Fundal Height (cm) 25 cm   Movement Present   Prenatal Vitals    Blood Pressure 110/60   Weight - Scale 84 8 kg (187 lb)   Urine Albumin/Glucose    Dilation/Effacement/Station    Vaginal Drainage    Draining Fluid No   Edema            General: Well appearing, no distress  Abdomen: Soft, gravid, nontender  Fundal Height: Appropriate for gestational age   Extremities: Warm and well perfused  Non tender

## 2022-03-30 LAB
EXTERNAL HEMATOCRIT: 27.7 %
EXTERNAL HEMOGLOBIN: 9.2 G/DL
EXTERNAL PLATELET COUNT: 242 K/ΜL

## 2022-03-31 ENCOUNTER — TELEPHONE (OUTPATIENT)
Dept: OBGYN CLINIC | Facility: CLINIC | Age: 37
End: 2022-03-31

## 2022-03-31 DIAGNOSIS — O24.419 GESTATIONAL DIABETES MELLITUS (GDM) IN THIRD TRIMESTER, GESTATIONAL DIABETES METHOD OF CONTROL UNSPECIFIED: Primary | ICD-10-CM

## 2022-03-31 PROBLEM — D50.8 IRON DEFICIENCY ANEMIA SECONDARY TO INADEQUATE DIETARY IRON INTAKE: Status: ACTIVE | Noted: 2022-03-31

## 2022-03-31 PROBLEM — O24.410 DIET CONTROLLED GESTATIONAL DIABETES MELLITUS (GDM) IN THIRD TRIMESTER: Status: ACTIVE | Noted: 2022-03-31

## 2022-03-31 LAB
ERYTHROCYTE [DISTWIDTH] IN BLOOD BY AUTOMATED COUNT: 12.4 % (ref 11.7–15.4)
GLUCOSE 1H P 50 G GLC PO SERPL-MCNC: 235 MG/DL (ref 65–139)
HCT VFR BLD AUTO: 27.7 % (ref 34–46.6)
HGB BLD-MCNC: 9.2 G/DL (ref 11.1–15.9)
MCH RBC QN AUTO: 27.4 PG (ref 26.6–33)
MCHC RBC AUTO-ENTMCNC: 33.2 G/DL (ref 31.5–35.7)
MCV RBC AUTO: 82 FL (ref 79–97)
PLATELET # BLD AUTO: 242 X10E3/UL (ref 150–450)
RBC # BLD AUTO: 3.36 X10E6/UL (ref 3.77–5.28)
RPR SER QL: NON REACTIVE
WBC # BLD AUTO: 6.7 X10E3/UL (ref 3.4–10.8)

## 2022-03-31 NOTE — TELEPHONE ENCOUNTER
Result Care Coordination        Result Notes     Giuliano Montilla, RN   3/31/2022 12:08 PM EDT Back to Top        Left a message for pt to call back to review  Lou Valenzuela DO   3/31/2022 11:59 AM EDT         1)  Please inform patient of diagnosis of GDM   Provide info for [facility] to follow up ASAP     2)  Inform patient of anemia   Pt to start Slow Fe BID   Return call from [address] [facility], reviewed to start Slow Fe 100mg with OJ  Alternate AM/PM dose with prenatal  Reviewed 1hr glucose indicating GDM-needs to follow up with MFM, refferal generated  Phone # provided for patient to call if she has not been scheduled by Monday  Debra verbalized understanding  MFM referral for GDM generated

## 2022-04-04 ENCOUNTER — ROUTINE PRENATAL (OUTPATIENT)
Dept: OBGYN CLINIC | Facility: CLINIC | Age: 37
End: 2022-04-04
Payer: COMMERCIAL

## 2022-04-04 VITALS
DIASTOLIC BLOOD PRESSURE: 74 MMHG | SYSTOLIC BLOOD PRESSURE: 124 MMHG | WEIGHT: 188 LBS | HEIGHT: 65 IN | BODY MASS INDEX: 31.32 KG/M2

## 2022-04-04 DIAGNOSIS — Z3A.29 29 WEEKS GESTATION OF PREGNANCY: Primary | ICD-10-CM

## 2022-04-04 DIAGNOSIS — O99.012 ANEMIA DURING PREGNANCY IN SECOND TRIMESTER: ICD-10-CM

## 2022-04-04 DIAGNOSIS — Z98.891 HISTORY OF VBAC: ICD-10-CM

## 2022-04-04 DIAGNOSIS — O34.211 MATERNAL CARE DUE TO LOW TRANSVERSE UTERINE SCAR FROM PREVIOUS CESAREAN DELIVERY: ICD-10-CM

## 2022-04-04 DIAGNOSIS — B00.9 HERPES SIMPLEX: ICD-10-CM

## 2022-04-04 DIAGNOSIS — D50.8 IRON DEFICIENCY ANEMIA SECONDARY TO INADEQUATE DIETARY IRON INTAKE: ICD-10-CM

## 2022-04-04 DIAGNOSIS — O24.410 DIET CONTROLLED GESTATIONAL DIABETES MELLITUS (GDM) IN THIRD TRIMESTER: ICD-10-CM

## 2022-04-04 LAB
SL AMB  POCT GLUCOSE, UA: NORMAL
SL AMB POCT URINE PROTEIN: NORMAL

## 2022-04-04 PROCEDURE — 99213 OFFICE O/P EST LOW 20 MIN: CPT | Performed by: OBSTETRICS & GYNECOLOGY

## 2022-04-04 NOTE — PROGRESS NOTES
Routine Prenatal Visit  [address], [address], [facility], [address] [facility]    Assessment/Plan:  Henri Taylor is a 31y o  year old [id number] at 29w2d who presents for routine prenatal visit  1  29 weeks gestation of pregnancy  -     POCT urine dip    2  Diet controlled gestational diabetes mellitus (GDM) in third trimester        -  Managed by [facility]IP[facility]    3  Iron deficiency anemia secondary to inadequate dietary iron intake       -  Patient states she was takin gP  O  iron once a day, advised to increase to BID    4  History of     5  Maternal care due to low transverse uterine scar from previous  delivery    5  Herpes simplex           Next OB Visit 2 weeks  Subjective:     CC: Prenatal care    Selena Ingrid Kawasaki is a 31 y o  [id number] female who presents for routine prenatal care at 29w2d  Pregnancy ROS: no leakage of fluid, pelvic pain, or vaginal bleeding  nml fetal movement  The following portions of the patient's history were reviewed and updated as appropriate: allergies, current medications, past family history, past medical history, obstetric history, gynecologic history, past social history, past surgical history and problem list       Objective:  /74 (BP Location: Left arm, Patient Position: Sitting, Cuff Size: Standard)   Ht 5' 5" (1 651 m)   Wt 85 3 kg (188 lb)   LMP 2021 (Exact Date)   BMI 31 28 kg/m²   Pregravid Weight/BMI: 80 7 kg (178 lb) (BMI 29 62)  Current Weight: 85 3 kg (188 lb)   Total Weight Gain: 4 536 kg (10 lb)   Pre-Jade Vitals      Most Recent Value   Prenatal Assessment    Prenatal Vitals    Blood Pressure 124/74   Weight - Scale 85 3 kg (188 lb)   Urine Albumin/Glucose    Dilation/Effacement/Station    Vaginal Drainage    Edema            General: Well appearing, no distress  Abdomen: Soft, gravid, nontender  Fundal Height: Appropriate for gestational age  Extremities: Warm and well perfused  Non tender

## 2022-04-06 ENCOUNTER — TELEMEDICINE (OUTPATIENT)
Dept: PERINATAL CARE | Facility: CLINIC | Age: 37
End: 2022-04-06
Payer: COMMERCIAL

## 2022-04-06 DIAGNOSIS — Z3A.29 29 WEEKS GESTATION OF PREGNANCY: ICD-10-CM

## 2022-04-06 DIAGNOSIS — O99.213 OBESITY AFFECTING PREGNANCY IN THIRD TRIMESTER: Primary | ICD-10-CM

## 2022-04-06 DIAGNOSIS — O24.419 GESTATIONAL DIABETES MELLITUS (GDM) IN THIRD TRIMESTER, GESTATIONAL DIABETES METHOD OF CONTROL UNSPECIFIED: Primary | ICD-10-CM

## 2022-04-06 DIAGNOSIS — O24.419 GESTATIONAL DIABETES MELLITUS (GDM) IN THIRD TRIMESTER, GESTATIONAL DIABETES METHOD OF CONTROL UNSPECIFIED: ICD-10-CM

## 2022-04-06 PROCEDURE — G0108 DIAB MANAGE TRN  PER INDIV: HCPCS

## 2022-04-06 RX ORDER — BLOOD SUGAR DIAGNOSTIC
STRIP MISCELLANEOUS
Qty: 100 STRIP | Refills: 3 | Status: SHIPPED | OUTPATIENT
Start: 2022-04-06 | End: 2022-06-05

## 2022-04-06 RX ORDER — BLOOD-GLUCOSE METER
1 KIT MISCELLANEOUS ONCE
Qty: 1 KIT | Refills: 0 | Status: SHIPPED | OUTPATIENT
Start: 2022-04-06 | End: 2022-04-06

## 2022-04-06 RX ORDER — LANCETS 33 GAUGE
EACH MISCELLANEOUS
Qty: 100 EACH | Refills: 3 | Status: SHIPPED | OUTPATIENT
Start: 2022-04-06

## 2022-04-06 NOTE — PATIENT INSTRUCTIONS
Welcome to The [facility] at [address] [place] [address]  Our team consists of RENEE Fuentes, Nikhil Velasquez, RN and two certified dieticians/educators: Mylene Harrington) Gopi Peraza and Luke grewal  Our medical assistant, Lulu Shelton can be reached at [phone] Monday thru Friday 7:45 am - 4:15 pm  We look forward to helping you manage your gestational diabetes during pregnancy  As we discussed and reviewed during class 1 please follow our instructions  Self-monitoring Blood Glucose: OneTouch Verio Flex  Always carry your meter and testing supplies with you at all times  Bring you glucose meter to all your appointments in our [facility] [place]   Check your blood sugar - fasting and 2 hours after the first bite of your meal    o Total of 4 blood sugars each day  o Fasting: No less than 8 hours and no more than 10 hours from your bedtime snack    - Fasting glucose should be checked when you wake up and before you start your day  - Check before you have anything to eat or drink   Goal Range:  o Fasting blood sugar (FBS):  60-90 mg/dL  o 1 hour after meals:  140 or less   o 2 hour after meals:  120 or less    How to report your blood sugars    Report blood sugars by 04/13/2022 no later than 04/15/2022  o Voicemail [phone]  - If no response in 24- 48 hours call [phone]  o Viptable message   - Can include up to 3 images/attachments per message  - [facility] to [facility]  o Viptable glucose flow sheet: under "track my health"  - Remember to hit the save button to submit your readings  How to check your blood sugar:   Wash your hands with soap and water or use waterless  to clean your hands  o Alcohol can dry your fingers and is not recommended  o Alcohol can also cause false, elevated glucose readings  o AVOID scented soaps    Gather your glucose meter kit and supplies     Prepare your meter by inserting a new test strip    o This will automatically turn on your meter  o Make sure test strips are not   o Use a new test strip each time  Prepare your lancing device by inserting the lancet               o After the lancet is securely in place, twist off the top to reveal needle    o Reattach lancing device top    Once lancing device top is reattached, you are now ready to prick the side of your fingertip to get a blood sample   o You can adjust the depth setting as needed   Apply the blood sample to test strip according to instructions   Make sure your sharp container is: heavy duty plastic, puncture-resistant lid, upright and stable, leak resistant, properly labeled   Record your blood sugar   o Log book  For references and video: go to - http://[employer]    Diet:  Please follow your 2200 calorie (CHO:[id number]) (PRO: 3-2-3/4-2-3/4-2) (see attachment)  -Remember 15 grams of carbohydrates = 1 serving of CHO   -Carbohydrates need to be paired with 7 grams or 1 ounce of protein for balance   -Food label:   -Start by checking the serving size on the label of packaged foods and drinks  The amount of calories, carbs, and fats on the label is based on one serving  Many items contain more than one serving per package   -Check the total grams (g) of carbs in one serving    -You can calculate the number of servings of carbs in one serving by dividing the total carbs by 15  For example, if a food has 30 g of total carbs, it would be equal to 2 servings of carbs  -Carbohydrates always need to be paired with protein   -You should be consuming a minimum of 175 grams of carbohydrates daily to avoid ketosis  -Remember to eat 3 meals and 3 snacks a day  Eating every 2 to 3 5 hours during the day  -Be sure to have bedtime snack that includes at least 30 grams of carbohydrates and 2 servings of protein   -Shop around the outside edge of the grocery store   This includes fresh fruits and vegetables, bulk grains, fresh meats, and fresh dairy   -Use low-heat cooking methods, such as baking, instead of high-heat cooking methods like deep frying   -Cook using healthy oils, such as olive, canola, or sunflower oil   -Eat meals and snacks regularly, preferably at the same times every day  Avoid going long periods of time without eating               -Eat some foods each day that contain healthy fats, such as avocado, nuts, seeds, and fish   -Always check the nutrition information of foods labeled as "low-fat" or "nonfat"  These foods may be higher in added sugar or refined carbs and should be avoided  Blood sugars:   -Report to our team on a weekly basis until you deliver  - If ok by your OB try adding a 20-30 minute walk after a meal to help keep glucose within range   -Continue follow up with OB and MFM as recommended   -Stay in close contact with diabetes education team    -Always have glucose available for hypoglycemia, use 15 by 15 rule  You can find 15:15 rule in your GDM booklet    -While sick or feeling ill, follow our sick day guidelines in our GDM booklet    -For travel and dining out tips refer to your GDM booklet  Very important to maintain tight glucose control during pregnancy to decrease risk factors including fetal macrosomia; birth injury; risk of ; polyhydramnios; pre-term labor; pre-eclampsia;  hypoglycemia; jaundice and stillbirth  Any questions give us a call at [phone] or send us a [employer] message to [facility] RN  The Diabetes and Pregnancy [facility] at [facility]

## 2022-04-06 NOTE — PROGRESS NOTES
Telemedicine consent    Patient: Navjot Weiner  Provider: Catalina Overton, RN  Provider located at [address] [place] [address]  [address] [facility]  [phone]    The patient was identified by name and date of birth  Debra Vasquez was informed that this is a telemedicine visit and that the visit is being conducted through [address] and patient was informed this is a secure, HIPAA-complaint platform  She agrees to proceed     My office door was closed  No one else was in the room  She acknowledged consent and understanding of privacy and security of the video platform  The patient has agreed to participate and understands they can discontinue the visit at any time  Patient is aware this is a billable service  I spent 50 minutes with the patient during this visit  Thank you for referring your patient to [facility] Diabetes in Pregnancy Program      Navjot Weiner is a  31 y o  female who presents today for Class 1  Patient is at 29w4d gestation, Estimated Date of Delivery: 22  Reviewed and updated the following from patients medical record: PMH, Problem List, Allergies, and Current Medications  Visit Diagnosis:  GDM in pregnancy method of control unspecified    Discussed with patient pathophysiology of GDM, untreated hyperglycemia in pregnancy and maternal fetal complications including fetal macrosomia,  hypoglycemia, polyhydramnios, increased incidence of  section,  labor, and in severe cases fetal demise and still birth   Discussed importance of blood glucose monitoring, nutrition, and medication if necessary in achieving BG goals       Additional Pregnancy Complications:  Obesity and Iron Deficiency    Labs:    Lab Results   Component Value Date    [id number] 235 (H) 2022       No results found for: GLUF, WJNMRWX3UW, DWJIPJH5TX, GZNHPQL2WC     No components found for: HGA1C    Medications:  No diabetes related medications    Anthropometrics:  Ht Readings from Last 3 Encounters:   22 5' 5" (1 651 m)   22 5' 5" (1 651 m)   22 5' 5" (1 651 m)     Wt Readings from Last 3 Encounters:   22 85 3 kg (188 lb)   22 84 8 kg (187 lb)   22 84 9 kg (187 lb 3 2 oz)     Pre-gravid weight: 80 7 kg (178 lb)  Pre-gravid BMI: 29 62  Weight Change: 4 536 kg (10 lb)  Weight gain recommendations: BMI (> 30) 11-20 lbs  Comments: May gain up to 10 more lbs    Recent Ultra Sound Results:  Date: 2022 Level 2  Fetal Growth: Normal  JENNIFER: Normal  Next  date: 2022    Blood Glucose Monitoring:   Glucose Meter: OneTouch Verio REFLECT  Instructed on testing blood sugars: 4 x per day (Fasting, 2 hour after start of each meal)    Gave instruction on site selection, skin preparation, loading strips and lancet device, meter activation, obtaining blood sample, test strip and lancet disposal and storage, and recording log book entries  Patient has good understanding of material covered and was able to test their own blood sugar in office today  Instruction for reporting blood sugar results weekly via:  Phone: [phone]   OR  My Chart (Message with image attachment, or Glucose Flowsheet)    Goal Blood Sugar Ranges:   Fastin-90 mg/dL  1 hour after the start of each meal: 140 mg/dL or less  2 hours after start of each meal: 120 mg/dL or less    Meal Plan (daily calorie and protein needs):  Calories: 2200 calorie (CHO:09-27-80-30-60-30) (PRO: 3-2-3/4-2-3/4-2)    Type of Diet:Regular  Additional Nutrition Concerns:     Patient currently not working  Wakes up at 6 am daily  Per patient she goes to sleep at 2 am  Explained sleep disturbances can effect blood sugars  Meal Plan Tips:  1  Patient was provided with a meal plan including 3 meals and 3 snacks  2  Discussed appropriate amounts of CHO, PRO, and Fat at each meal and snack     3  Reviewed CHO exchange list, and portion sizes for both CHO and PRO via food models  4  Instruction on how to read a food label  5  Provided suggested meal/snack options to increase nutrition and maintain consistent meal and snack intakes  6  Instructed on how to keep a 3-day food diary to be brought to follow- up appointment  7  Encouraged  patient to eat every 2 0-3 5 hours while awake  8  Encouraged patient to go no longer than 8-10 hours fasting overnight until first meal of the day  Physical Activity:  Discussed benefits of physical activity to optimize blood glucose control, encouraged activity at patient is physically able  Always consult a physician prior to starting an exercise program  Recommend 20-30 minutes daily  Patient Stated Goal: "I will eat 3 meals and 3 snacks each day, including protein at each"    Diabetes Self Management Support Plan outside of ongoing care: Spouse/Family    Learner/s Present:Learners Present: Patient   Barriers to Learning/Change: Motivation  Expected Compliance: good    Date to report blood sugars: 04/13/2022 - 04/15/2022    Begin Time: 9:30 am  End Time: 10:20 am    Demographics:  Ethnicity: Black/   Country of Origin: [place]  Highest grade completed: High School Diploma  Occupation: Unemployed  Employer Name:none  Hours worked per week: unemployed    Personal & Family History:  Personal history of diabetes? yes Mother  Family members with diabetes: Mother  Have you had a baby weighing larger than 9 lbs? no  Are you having swelling or fluid retention? no  Have you been placed on bedrest? no    Nutrition & Physical Activity:  Do you exercise? no  How many meals do you eat daily? 2  List times of meals: Meal # 1: 12 noon/ Meal #2: 6 pm  How many snacks do you eat daily? 3  List times of snacks: Snack #1: 1 pm/ Snack #2: 3 pm/ Snack #3: 10 pm  What type of diet are you following at home?  Regular  Do you have special Bahai or ethnic dietary preferences? no  Do you have any food allergies or intolerances? no  Do you receive WIC or food stamps? yes BOTH    Learning preferences: How do you learn best? Reading  How do you rate your health? Good  Who is your primary support person? Significant Other  How do you cope with stress? "I don't know"  How do you feel about being pregnant with diabetes? "Regular"    It was a pleasure working with them today  Please feel free to call with any questions or concerns      Vicenta Cook RN  Diabetes Educator  [facility]  Diabetes in Pregnancy Program  [address] [place] [address], [address] [place] [address],[address]  [address]

## 2022-04-14 ENCOUNTER — DOCUMENTATION (OUTPATIENT)
Dept: PERINATAL CARE | Facility: CLINIC | Age: 37
End: 2022-04-14

## 2022-04-14 NOTE — PROGRESS NOTES
Date: 04/14/22  Hayley Arnold Mews  1985  Estimated Date of Delivery: 6/18/22  30w5d  [facility]N: [facility]   GDM in pregnancy method of control unspecified  Additional Pregnancy Complications: Obesity; 1 hr  mg/dl          Diet: : 2200 calorie (CHO:78-88-44-30-60-30) (PRO: 3-2-3/4-2-3/4-2) Advised to change bedtime snack to 1 serving CHO (15 gms) and 2-3 ounces of protein  Examples provided  Encouraged patient to be cautious of carbohydrate portion sizes and to pair protein with carbohydrate with all meals and snacks  Avoid foods not recommended on meal plan such as fried foods, desserts, juice and sugar sweetened soda  Gestational diabetes meal plan; 3 meals and 3 snacks  Testing blood sugars: 4 x per day (Fasting, 2 hour after start of each meal) Suggested setting a phone alarm for reminders to test blood sugars on time  Advised to maintain at least 8 hrs fasting and no more than 10 hrs  Meter:  OneTouch Verio Reflect  Activity: Walks 30 minutes daily, follow OB recommendations  Support System: Significant Other / family   Patient Goal: "I will eat 3 meals and 3 snacks each day, including protein at each"  Education:  Class 1 completed with Bri Kelly RN on 4/6/22  Class 2 is scheduled with Kwabena Day with on 4/19/22  Medication options to be discussed and initiitated if FBG continue to be >90 mg/dl  Requested patient bring 3 day food diary for review at class 2 appointment  Weight Change:    Pre-gravid weight: 80 7 kg (178 lb)  4 536 kg (10 lb)    Labs  No components found for: HGA1C   Reminded patient to complete active blood work for A1c and CMP          Ultrasounds  Date: 01/31/2022 Level 2  Fetal Growth: Normal  JENNIFER: Normal  Next US date: 04/25/2022  Further fetal surveillance  Beginning at 32 weeks, NST / JENNIFER twice a week, if indicated    Quentin Jha RD,LDN,CDE

## 2022-04-19 ENCOUNTER — ROUTINE PRENATAL (OUTPATIENT)
Dept: OBGYN CLINIC | Facility: CLINIC | Age: 37
End: 2022-04-19
Payer: COMMERCIAL

## 2022-04-19 ENCOUNTER — TELEMEDICINE (OUTPATIENT)
Dept: PERINATAL CARE | Facility: CLINIC | Age: 37
End: 2022-04-19
Payer: COMMERCIAL

## 2022-04-19 ENCOUNTER — TELEPHONE (OUTPATIENT)
Dept: PERINATAL CARE | Facility: CLINIC | Age: 37
End: 2022-04-19

## 2022-04-19 VITALS
HEIGHT: 65 IN | DIASTOLIC BLOOD PRESSURE: 64 MMHG | SYSTOLIC BLOOD PRESSURE: 114 MMHG | WEIGHT: 187 LBS | BODY MASS INDEX: 31.16 KG/M2

## 2022-04-19 DIAGNOSIS — O24.410 DIET CONTROLLED GESTATIONAL DIABETES MELLITUS (GDM) IN THIRD TRIMESTER: Primary | ICD-10-CM

## 2022-04-19 DIAGNOSIS — Z3A.31 31 WEEKS GESTATION OF PREGNANCY: ICD-10-CM

## 2022-04-19 DIAGNOSIS — D50.8 IRON DEFICIENCY ANEMIA SECONDARY TO INADEQUATE DIETARY IRON INTAKE: ICD-10-CM

## 2022-04-19 DIAGNOSIS — O99.013 ANEMIA DURING PREGNANCY IN THIRD TRIMESTER: ICD-10-CM

## 2022-04-19 DIAGNOSIS — E66.3 OVERWEIGHT: ICD-10-CM

## 2022-04-19 DIAGNOSIS — O24.419 GESTATIONAL DIABETES MELLITUS (GDM) IN THIRD TRIMESTER, GESTATIONAL DIABETES METHOD OF CONTROL UNSPECIFIED: Primary | ICD-10-CM

## 2022-04-19 DIAGNOSIS — Z98.891 HISTORY OF VBAC: ICD-10-CM

## 2022-04-19 DIAGNOSIS — O34.211 MATERNAL CARE DUE TO LOW TRANSVERSE UTERINE SCAR FROM PREVIOUS CESAREAN DELIVERY: ICD-10-CM

## 2022-04-19 LAB
SL AMB  POCT GLUCOSE, UA: NORMAL
SL AMB POCT URINE PROTEIN: NORMAL

## 2022-04-19 PROCEDURE — 99213 OFFICE O/P EST LOW 20 MIN: CPT | Performed by: OBSTETRICS & GYNECOLOGY

## 2022-04-19 PROCEDURE — G0108 DIAB MANAGE TRN  PER INDIV: HCPCS

## 2022-04-19 RX ORDER — BLOOD-GLUCOSE METER
KIT MISCELLANEOUS
COMMUNITY
Start: 2022-04-06

## 2022-04-19 NOTE — ASSESSMENT & PLAN NOTE
A1C pending  Currently monitoring glucose with dietary changes only and following with DIPP     No results found for: HGBA1C

## 2022-04-19 NOTE — PROGRESS NOTES
Virtual Regular Visit    Verification of patient location:    Patient is located in the following state in which I hold an active license [place]      Assessment/Plan:    Problem List Items Addressed This Visit        Endocrine    Diet controlled gestational diabetes mellitus (GDM) in third trimester - Primary      Other Visit Diagnoses     31 weeks gestation of pregnancy        Overweight                   Reason for visit is   Chief Complaint   Patient presents with    Gestational Diabetes    Patient Education    Virtual Regular Visit        Encounter provider Ayana Dugan    Provider located at [address] [place] [id number] [phone]      Recent Visits  No visits were found meeting these conditions  Showing recent visits within past 7 days and meeting all other requirements  Today's Visits  Date Type Provider Dept   04/19/22 1401 [address]   Showing today's visits and meeting all other requirements  Future Appointments  No visits were found meeting these conditions  Showing future appointments within next 150 days and meeting all other requirements       The patient was identified by name and date of birth  Debra Moreno was informed that this is a telemedicine visit and that the visit is being conducted through [facility] and patient was informed this is a secure, HIPAA-complaint platform  She agrees to proceed     My office door was closed  No one else was in the room  She acknowledged consent and understanding of privacy and security of the video platform  The patient has agreed to participate and understands they can discontinue the visit at any time  Patient is aware this is a billable service  Subjective  Debra Moreno is a 31 y  o pregnant female         HPI     Past Medical History:   Diagnosis Date    Chlamydia     Chlamydia contact, treated     Herpes simplex     Papanicolaou smear 2020    neg/neg    Trichomoniasis     Urogenital trichomoniasis        Past Surgical History:   Procedure Laterality Date     SECTION         Current Outpatient Medications   Medication Sig Dispense Refill    Blood Glucose Monitoring Suppl (OneTouch Verio Reflect) w/Device KIT Use as directed      Ferrous Sulfate (Iron) 325 (65 Fe) MG TABS Take by mouth      ketoconazole (NIZORAL) 2 % shampoo APPLY TO THE AFFECTED AREA(S) TOPICALLY TWICE WEEKLY      OneTouch Delica Lancets 58U MISC 4 times a day   each 3    OneTouch Verio test strip Use as instructed, test blood sugar 4 times a day,  strip 3    Prenatal Vit-Fe Fumarate-FA (PRENATAL VITAMIN PO) Take by mouth       No current facility-administered medications for this visit  No Known Allergies    Review of Systems    Video Exam    There were no vitals filed for this visit  Physical Exam     I spent 60 minutes with patient today in which greater than 50% of the time was spent in counseling/coordination of care regarding gestational diabetes  VIRTUAL VISIT DISCLAIMER      Debra Leal verbally agrees to participate in [employer]  Pt is aware that Virtual Care Services could be limited without vital signs or the ability to perform a full hands-on physical Dae Yonychaya understands she or the provider may request at any time to terminate the video visit and request the patient to seek care or treatment in person  Thank you for referring your patient to [facility] Maternal Fetal Medicine Diabetes in Pregnancy Program      Rich Vlaera is a  31 y o  female who presents today for Class 2  Patient completed class 1 on 22  However the majority of appointment today was spent on establishing a schedule for testing and diet guidelines, discussion on medication options and briefly reviewing portions of class 2    Patient is at 31w3d gestation, Estimated Date of Delivery: 22  Reviewed and updated the following from patients medical record: PMH, Problem List, Allergies, and Current Medications  Visit Diagnosis:  GDM in pregnancy method of control unspecified    Discussed with patient pathophysiology of GDM, untreated hyperglycemia in pregnancy and maternal fetal complications including fetal macrosomia,  hypoglycemia, polyhydramnios, increased incidence of  section,  labor, and in severe cases fetal demise and still birth   Discussed importance of blood glucose monitoring, nutrition, and medication if necessary in achieving BG goals  Additional Pregnancy Complications:  overweight prior to pregnancy    Labs:    Lab Results   Component Value Date    [id number] 235 (H) 2022       No results found for: GLUF, KDWGENA2SY, DOMFEFF0DC, IBENJXS8TU     No components found for: HGA1C Reminded patient to complete active blood work for A1c and CMP this week if possible  Medications:  No diabetes related medications    Anthropometrics:  Ht Readings from Last 3 Encounters:   22 5' 5" (1 651 m)   22 5' 5" (1 651 m)   22 5' 5" (1 651 m)     Wt Readings from Last 3 Encounters:   22 84 8 kg (187 lb)   22 85 3 kg (188 lb)   22 84 8 kg (187 lb)     Pre-gravid weight: 80 7 kg (178 lb)  Pre-gravid BMI: 29 62  Weight Change: 4 082 kg (9 lb)  Weight gain recommendations: BMI (25-29 9) 15-25 lbs  Comments: Patient may gain up to 16 pounds for duration of her pregnancy       Recent Ultra Sound Results:  Date:22  Fetal Growth: Normal  JENNIFER: Normal  Next US date: 22    Blood Glucose Monitoring:   Glucose Meter: OneTouch Verio Reflect  Instructed on testing blood sugars: 4 x per day (Fasting, 2 hour after start of each meal)    Gave instruction on site selection, skin preparation, loading strips and lancet device, meter activation, obtaining blood sample, test strip and lancet disposal and storage, and recording log book entries  Patient has good understanding of material covered and was able to test their own blood sugar in office today  Instruction for reporting blood sugar results weekly via:  Phone: [phone]   OR  My Chart (Message with image attachment, or Glucose Flowsheet)    Goal Blood Sugar Ranges:   Fastin-90 mg/dL  1 hour after the start of each meal: 140 mg/dL or less  2 hours after start of each meal: 120 mg/dL or less    Meal Plan (daily calorie and protein needs):  Calories: 2200 calorie (CHO:71-97-77-30-60-30) (PRO: 3-2-3/4-2-3/4-2)    Type of Diet:Regular  Additional Nutrition Concerns: Patient currently not working  Per patient she goes to sleep at 2 am and 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  She then goes back to sleep at 8:30-9 AM until around 2:30 PM  First meal eating is around 4:30 PM which is Boost glucose control  However she snacks on chips and is drinking sugar sweetened lemonade and iced tea as well as apple juice on and off throughout the day  She is not following her meal plan  Typically eating take out around 6:00 PM which includes french fries  She loves pasta and cristy sauce as her dinner meal       Meal Plan Tips:  1  Patient was provided with a meal plan including 3 meals and 3 snacks  2  Discussed appropriate amounts of CHO, PRO, and Fat at each meal and snack  3  Reviewed CHO exchange list, and portion sizes for both CHO and PRO via food models  4  Instruction on how to read a food label  5  Provided suggested meal/snack options to increase nutrition and maintain consistent meal and snack intakes  Avoid fried foods and sugar sweetened beverages  Suggested crystal light or diet soda up to 4 servings per day  6  Instructed on how to keep a 3-day food diary to be brought to follow- up appointment  7  Encouraged  patient to eat every 2 0-3 5 hours while awake  8   Encouraged patient to go no longer than 8-10 hours fasting overnight until first meal of the day  Patient is not checking true fasting blood sugars or 2 hr pp  She is eating often right before checking blood sugars  9  Reviewed a schedule outlining times of the day for eating and checking blood sugars  Schedule:   8:30 AM to 4:30 PM fasting for 8 hrs for FBG measurement Encouraged patient to not eat  from 2 am to 10 am for fasting blood sugar and then start her day  She declined and said she is used to this schedule  4:30 PM 1st meal: Boost glucose control and a piece of fruit with peanut butter  6:30 PM 2 hr pp measurement  6:30 PM 2nd meal   8:30 PM 2 hr pp measurement  Snack  11:00 PM 3rd meal  1 AM 2 hr pp measurement  2:00 AM bedtime snack (1 serving CHO and 2-3 ounces of protein  Medication options discussed today  Patient was advised that medications may need to be added to her regimen if blood glucose measurements continue to be above target ranges  Patient may benefit from in-office visit to review insulin injection technique to assure understanding  Question understanding of class 2 information today  Patient was very quiet and therefore level of understanding was difficult to measure  Discussed with nurse practitioner today  BBG Log:  Review of blood glucose log  Patient reported not having tested a FBG today  She stated she was awake in the middle of the night hungry and was eating "a lot of chips and drinking sugar sweetened iced tea"  Patient was not sure the time frame of fasting prior to FBG measurement or if she had been snacking prior to after meal blood sugars  In-basket message sent to Robert Villar MA to send prescription for Glucerna to [address] office  Physical Activity:  Discussed benefits of physical activity to optimize blood glucose control, encouraged activity at patient is physically able  Always consult a physician prior to starting an exercise program  Recommend 20-30 minutes daily      Patient Stated Goal: "I will eat 3 meals and 3 snacks each day, including protein at each"    Diabetes Self Management Support Plan outside of ongoing care: Spouse/Family    Learner/s Present:Learners Present: Patient   Barriers to Learning/Change: Motivation  Expected Compliance: good in reporting blood sugars however uncertain regarding following meal plan    Date to report blood sugars: Requested patient update flow sheet every Monday  Follow up scheduled on 5/3/22 (However appointment may need to be scheduled sooner due to elevations on previous reports and if bg measurements continue above target ranges )     Begin Time: 9:00 AM  End Time: 10:08 AM    It was a pleasure working with them today  Please feel free to call with any questions or concerns      Buddy Ocampo, RD,LDN,CDE  Diabetes Educator  [facility]  Diabetes in Pregnancy Program  [address] [place] [address],[address]  [address]

## 2022-04-19 NOTE — TELEPHONE ENCOUNTER
LMOM to return call to schedule an appt with Rubia and to get her [facility] office info      Thank you

## 2022-04-19 NOTE — ASSESSMENT & PLAN NOTE
Hgb 9 2 @ 28 week testing  She will be more compliant with PO iron   Rec recheck @ 34-36 weeks, which would give enough time for iron infusion, if necessary

## 2022-04-19 NOTE — PROGRESS NOTES
Routine Prenatal Visit  [address], [address], [facility], [address] [facility]    Assessment/Plan:  Vallorie Simmonds is a 31y o  year old [id number] at 31w3d who presents for routine prenatal visit  1  Diet controlled gestational diabetes mellitus (GDM) in third trimester  Assessment & Plan:  A1C pending  Currently monitoring glucose with dietary changes only and following with DIPP  No results found for: HGBA1C      2  Iron deficiency anemia secondary to inadequate dietary iron intake    3  History of     4  Anemia during pregnancy in third trimester  Assessment & Plan:  Hgb 9 2 @ 28 week testing  She will be more compliant with PO iron  Rec recheck @ 34-36 weeks, which would give enough time for iron infusion, if necessary        5  Maternal care due to low transverse uterine scar from previous  delivery    6  31 weeks gestation of pregnancy  -     POCT urine dip          Subjective:     CC: Prenatal care    Debra Mcmillan is a 31 y o  [id number] female who presents for routine prenatal care at 31w3d  Pregnancy ROS: no leakage of fluid, pelvic pain, or vaginal bleeding  normal fetal movement      The following portions of the patient's history were reviewed and updated as appropriate: allergies, current medications, past family history, past medical history, obstetric history, gynecologic history, past social history, past surgical history and problem list       Objective:  /64 (BP Location: Left arm, Patient Position: Sitting, Cuff Size: Standard)   Ht 5' 5" (1 651 m)   Wt 84 8 kg (187 lb)   LMP 2021 (Exact Date)   BMI 31 12 kg/m²   Pregravid Weight/BMI: 80 7 kg (178 lb) (BMI 29 62)  Current Weight: 84 8 kg (187 lb)   Total Weight Gain: 4 082 kg (9 lb)   Pre- Vitals      Most Recent Value   Prenatal Assessment    Fetal Heart Rate 140   Fundal Height (cm) 32 cm   Movement Present   Presentation Vertex   Prenatal Vitals    Blood Pressure 114/64   Weight - Scale 84 8 kg (187 lb)   Urine Albumin/Glucose    Dilation/Effacement/Station    Vaginal Drainage    Edema            General: Well appearing, no distress  Respiratory: Unlabored breathing  Cardiovascular: Regular rate  Abdomen: Soft, gravid, nontender  Fundal Height: Appropriate for gestational age  Extremities: Warm and well perfused  Non tender

## 2022-04-20 LAB
ALBUMIN SERPL-MCNC: 3.9 G/DL (ref 3.8–4.8)
ALBUMIN/GLOB SERPL: 1.6 {RATIO} (ref 1.2–2.2)
ALP SERPL-CCNC: 121 IU/L (ref 44–121)
ALT SERPL-CCNC: 13 IU/L (ref 0–32)
AST SERPL-CCNC: 14 IU/L (ref 0–40)
BILIRUB SERPL-MCNC: <0.2 MG/DL (ref 0–1.2)
BUN SERPL-MCNC: 2 MG/DL (ref 6–20)
BUN/CREAT SERPL: 5 (ref 9–23)
CALCIUM SERPL-MCNC: 9.6 MG/DL (ref 8.7–10.2)
CHLORIDE SERPL-SCNC: 104 MMOL/L (ref 96–106)
CO2 SERPL-SCNC: 19 MMOL/L (ref 20–29)
CREAT SERPL-MCNC: 0.39 MG/DL (ref 0.57–1)
EGFR: 134 ML/MIN/1.73
GLOBULIN SER-MCNC: 2.4 G/DL (ref 1.5–4.5)
GLUCOSE SERPL-MCNC: 105 MG/DL (ref 65–99)
HBA1C MFR BLD: 6.9 % (ref 4.8–5.6)
POTASSIUM SERPL-SCNC: 3.6 MMOL/L (ref 3.5–5.2)
PROT SERPL-MCNC: 6.3 G/DL (ref 6–8.5)
SODIUM SERPL-SCNC: 138 MMOL/L (ref 134–144)

## 2022-04-22 ENCOUNTER — DOCUMENTATION (OUTPATIENT)
Dept: PERINATAL CARE | Facility: CLINIC | Age: 37
End: 2022-04-22

## 2022-04-22 NOTE — PROGRESS NOTES
Date: 04/22/22  Rosa Mendoza  1985  Estimated Date of Delivery: 6/18/22  31w6d  OB/GYN: Anna Roldan   GDM in pregnancy method of control unspecified  Additional Pregnancy Complications: Obesity; 1 hr  mg/dl      Plan: Patient is scheduled on 4/26/22 with RENEE Shaw to discuss medication options  Patient can only do virtual visit  Follow up also scheduled on 5/3/22 to discuss blood sugars and also food diary    Diet: : 2200 calorie (CHO:10-19-61-30-60-30) (PRO: 3-2-3/4-2-3/4-2) Advised to continue bedtime snack to 1 serving CHO (15 gms) and 2-3 ounces of protein  Encouraged patient to be cautious of carbohydrate portion sizes and to pair protein with carbohydrate with all meals and snacks  Avoid foods not recommended on meal plan such as fried foods, desserts, juice and sugar sweetened soda  Gestational diabetes meal plan; 3 meals and 3 snacks  Testing blood sugars: 4 x per day (Fasting, 2 hour after start of each meal) Suggested setting a phone alarm for reminders to test blood sugars on time  Advised to maintain at least 8 hrs fasting and no more than 10 hrs  Meter:  OneTouch Verio Reflect  Activity: Walks 30 minutes daily, follow OB recommendations  Support System: Significant Other / family   Patient Goal: "I will eat 3 meals and 3 snacks each day, including protein at each"  Education:  Class 1 completed with Dwayne Ibrahim RN on 4/6/22  Class 2 is scheduled with Pita ugarte on 4/19/22       Weight Change:    Pre-gravid weight: 80 7 kg (178 lb)  4 082 kg (9 lb)    Labs  4/19/22 HGA1c 6 9%  CMP completed     Ultrasounds  Date: 01/31/2022 Level 2  Fetal Growth: Normal  JENNIFER: Normal  Next US date: 04/25/2022    Further fetal surveillance  Beginning at 32 weeks, NST / JENNIFER twice a week, if indicated    Marley Diaz RD,LDN,CDE  Diabetes and Pregnancy Program

## 2022-04-25 ENCOUNTER — ULTRASOUND (OUTPATIENT)
Dept: PERINATAL CARE | Facility: OTHER | Age: 37
End: 2022-04-25
Payer: COMMERCIAL

## 2022-04-25 VITALS
WEIGHT: 187.4 LBS | HEIGHT: 65 IN | BODY MASS INDEX: 31.22 KG/M2 | SYSTOLIC BLOOD PRESSURE: 108 MMHG | HEART RATE: 100 BPM | DIASTOLIC BLOOD PRESSURE: 53 MMHG

## 2022-04-25 DIAGNOSIS — Z36.2 ENCOUNTER FOR FOLLOW-UP ULTRASOUND OF FETAL ANATOMY: ICD-10-CM

## 2022-04-25 DIAGNOSIS — Z36.89 ENCOUNTER FOR ULTRASOUND TO ASSESS FETAL GROWTH: ICD-10-CM

## 2022-04-25 DIAGNOSIS — Z3A.32 32 WEEKS GESTATION OF PREGNANCY: Primary | ICD-10-CM

## 2022-04-25 DIAGNOSIS — O24.410 DIET CONTROLLED GESTATIONAL DIABETES MELLITUS (GDM) IN THIRD TRIMESTER: ICD-10-CM

## 2022-04-25 PROCEDURE — 76819 FETAL BIOPHYS PROFIL W/O NST: CPT | Performed by: OBSTETRICS & GYNECOLOGY

## 2022-04-25 PROCEDURE — 76816 OB US FOLLOW-UP PER FETUS: CPT | Performed by: OBSTETRICS & GYNECOLOGY

## 2022-04-25 PROCEDURE — 99214 OFFICE O/P EST MOD 30 MIN: CPT | Performed by: OBSTETRICS & GYNECOLOGY

## 2022-04-25 NOTE — LETTER
April 25, 2022     Carolina Zaidi MD  [facility] [address]  [address] [facility] [address]  [facility] [address]    Patient: Mirlande Bennett   YOB: 1985   Date of Visit: 4/25/2022       Dear Dr Carney Ards: Thank you for referring Neeta Torres to me for evaluation  Below are my notes for this consultation  If you have questions, please do not hesitate to call me  I look forward to following your patient along with you  Sincerely,        Romayne Sauers, MD        CC: No Recipients  Romayne Sauers, MD  4/25/2022 12:06 PM  Sign when Signing Visit  [id number] [facility]: Ms Vik Bhandari was seen today at [place] [address] for fetal growth and followup missed anatomy ultrasound  See ultrasound report under "OB Procedures" tab    Please don't hesitate to contact our office with any concerns or questions   -Romayne Sauers, MD

## 2022-04-25 NOTE — LETTER
April 25, 2022     Nitesh Clayton, [address] [facility] [place] [address]    Patient: Isra Everett   YOB: 1985   Date of Visit: 4/25/2022       [facility],    I advised her that I recommend insulin due to fasting hyperglycemia  She is seeing you 4/26  Thanks! Sincerely,        Anne Ritchie MD        CC: No Recipients  Anne Ritchie MD  4/25/2022 12:06 PM  Sign when Signing Visit  [id number] [facility]: Ms Ina Fonseca was seen today at 33w3d for fetal growth and followup missed anatomy ultrasound  See ultrasound report under "OB Procedures" tab    Please don't hesitate to contact our office with any concerns or questions   -Anne Ritchie MD

## 2022-04-25 NOTE — PROGRESS NOTES
[id number] [facility]: Ms Stephanie Tate was seen today at [place] [address] for fetal growth and followup missed anatomy ultrasound  See ultrasound report under "OB Procedures" tab    Please don't hesitate to contact our office with any concerns or questions   -Nereyda De Los Santos MD

## 2022-04-25 NOTE — PATIENT INSTRUCTIONS
Please refer to " Ultrasound" under test results in MyChart for today's ultrasound findings  Congratulations, and best wishes for a healthy remainder of the pregnancy! Thank you for choosing [facility] for your visit today  We appreciate your trust and the opportunity to assist your obstetrician with your care  We value your feedback regarding the care we are providing  Following today's appointment, you may receive a patient satisfaction survey by mail or e-mail requesting feedback on your visit  We ask that you complete the survey to  help us understand how we are doing  Thank you for in advance for your feedback

## 2022-04-26 ENCOUNTER — TELEMEDICINE (OUTPATIENT)
Dept: PERINATAL CARE | Facility: CLINIC | Age: 37
End: 2022-04-26
Payer: COMMERCIAL

## 2022-04-26 ENCOUNTER — HOSPITAL ENCOUNTER (OUTPATIENT)
Facility: HOSPITAL | Age: 37
Discharge: HOME | End: 2022-04-26
Attending: STUDENT IN AN ORGANIZED HEALTH CARE EDUCATION/TRAINING PROGRAM | Admitting: STUDENT IN AN ORGANIZED HEALTH CARE EDUCATION/TRAINING PROGRAM
Payer: COMMERCIAL

## 2022-04-26 ENCOUNTER — HOSPITAL ENCOUNTER (OUTPATIENT)
Dept: LABOR AND DELIVERY | Facility: HOSPITAL | Age: 37
Discharge: HOME | End: 2022-04-26
Payer: COMMERCIAL

## 2022-04-26 VITALS
HEIGHT: 65 IN | OXYGEN SATURATION: 98 % | TEMPERATURE: 97.5 F | SYSTOLIC BLOOD PRESSURE: 119 MMHG | RESPIRATION RATE: 16 BRPM | DIASTOLIC BLOOD PRESSURE: 73 MMHG | WEIGHT: 187 LBS | HEART RATE: 98 BPM | BODY MASS INDEX: 31.16 KG/M2

## 2022-04-26 VITALS — BODY MASS INDEX: 31.16 KG/M2 | HEIGHT: 65 IN | WEIGHT: 187 LBS

## 2022-04-26 DIAGNOSIS — O99.810 HYPERGLYCEMIA IN PREGNANCY: ICD-10-CM

## 2022-04-26 DIAGNOSIS — O24.419 GESTATIONAL DIABETES MELLITUS (GDM) IN THIRD TRIMESTER, GESTATIONAL DIABETES METHOD OF CONTROL UNSPECIFIED: Primary | ICD-10-CM

## 2022-04-26 DIAGNOSIS — Z3A.32 32 WEEKS GESTATION OF PREGNANCY: ICD-10-CM

## 2022-04-26 PROCEDURE — 99213 OFFICE O/P EST LOW 20 MIN: CPT | Performed by: NURSE PRACTITIONER

## 2022-04-26 PROCEDURE — 99212 OFFICE O/P EST SF 10 MIN: CPT

## 2022-04-26 PROCEDURE — NC001 PR NO CHARGE: Performed by: OBSTETRICS & GYNECOLOGY

## 2022-04-26 RX ORDER — INSULIN GLARGINE 100 [IU]/ML
20 INJECTION, SOLUTION SUBCUTANEOUS
Qty: 15 ML | Refills: 0 | Status: SHIPPED | OUTPATIENT
Start: 2022-04-26 | End: 2022-05-26 | Stop reason: SDUPTHER

## 2022-04-26 NOTE — ASSESSMENT & PLAN NOTE
-Starting weight 178 lbs  -Current weight 187 lbs  -9 lbs weight gain  -Recommended weight gain 15 to 25 lbs  -Try to follow GDM diet

## 2022-04-26 NOTE — DISCHARGE INSTRUCTIONS
Fetal Movement   WHAT YOU NEED TO KNOW:   Fetal movements are the kicks, rolls, and hiccups of your unborn baby  You may start to feel these movements when you are 20 weeks pregnant  The movements grow stronger and more frequent as your baby grows  Fetal movements show that your unborn baby is getting the oxygen and nutrients he or she needs before birth  Fewer fetal movements may signal a problem with your baby's health  DISCHARGE INSTRUCTIONS:   Follow up with your healthcare provider or obstetrician as directed:  Write down your questions so you remember to ask them during your visits  Normal fetal movement:  Fetal activity can be described by 4 states, from least to most active  During quiet sleep, your unborn baby may be still for up to 2 hours  During active sleep, he or she kicks, rolls, and moves often  During the quiet awake state, he or she may only move his or her eyes  The active awake state includes strong kicks and rolls  What affects fetal movement:  You may feel your baby move more after you eat, or after you drink caffeine  You may feel your baby move less while you are more active, such as when you exercise  You may also feel fewer movements if you are obese  Certain medicines can change your baby's movements  Tell your healthcare provider about the medicines you are taking  Track fetal movements at home:  Fetal movement is most often felt when you lie quietly on your side  Your healthcare provider may ask you to count movements for 2 hours  He or she may ask you to track how long it takes for your baby to move 10 times  Keep a log of your baby's movements  Contact your healthcare provider or obstetrician if:   · It takes longer than usual to feel 10 of your unborn baby's movements  · You do not feel your unborn baby move at least 10 times in 2 hours  · The skin on your hands, feet, and around your eyes is more swollen than usual     · You have a headache for at least 24 hours      · Tiny red dots appear on your skin  · Your belly is tender when you press on it  · You have questions or concerns about your condition or care  Return to the emergency department if:   · You do not feel your unborn baby move for 12 hours  · You feel cramping or constant pain in your abdomen  · You have heavy bleeding from your vagina  · You have a severe headache and cannot see clearly  · You are having trouble breathing or are vomiting  · You have a seizure  © Copyright [employer] 2021 Information is for End User's use only and may not be sold, redistributed or otherwise used for commercial purposes  All illustrations and images included in CareNotes® are the copyrighted property of A D A M , Inc  or [facility] Natalia Carranza   The above information is an  only  It is not intended as medical advice for individual conditions or treatments  Talk to your doctor, nurse or pharmacist before following any medical regimen to see if it is safe and effective for you

## 2022-04-26 NOTE — PATIENT INSTRUCTIONS
-A1c 6 9% abnormal; goal is 5 6% or less    -6 to 12 weeks post delivery complete 2 hour glucose tolerance test to rule out diabetes  -Due to hyperglycemia; start insulin glargine Lantus; basaglar or Semglee depending on insurance formulary at 20 units at 11 PM daily   -Always have a bedtime snack with at least 15 grams of carbohydrates and 2 to 3 ounces of protein  -Try to avoid fasting for greater than 10 hours during sleep hours  -Try to follow GDM diet, 3 meals and 3 snacks a day with combination of carbohydrates and protein per meal/snack  -SMBG fasting; 2 hours post start of each meal and with hypoglycemia   -Continue reporting via glucose flowsheet   -Send a message on Monday, 5/2/2022 for readings to be reviewed  -Always have glucose available to treat hypoglycemia; use 15 by 15 rule  -NST twice a week and JENNIFER weekly     -Fetal growth ultrasound every 4 to 6 weeks as recommended   -Continue follow up with OB and MFM as recommended   -Stay in close contact with diabetes team

## 2022-04-26 NOTE — ASSESSMENT & PLAN NOTE
-A1c 6 9% abnormal; goal is 5 6% or less    -6 to 12 weeks post delivery complete 2 hour glucose tolerance test to rule out diabetes  -Due to hyperglycemia; start insulin glargine Lantus; basaglar or Semglee depending on insurance formulary at 20 units at 11 PM daily   -Always have a bedtime snack with at least 15 grams of carbohydrates and 2 to 3 ounces of protein  -Try to avoid fasting for greater than 10 hours during sleep hours  -Try to follow GDM diet, 3 meals and 3 snacks a day with combination of carbohydrates and protein per meal/snack  -SMBG fasting; 2 hours post start of each meal and with hypoglycemia   -Continue reporting via glucose flowsheet   -Send a message on Monday, 5/2/2022 for readings to be reviewed  -Always have glucose available to treat hypoglycemia; use 15 by 15 rule  -NST twice a week and JENNIFER weekly     -Fetal growth ultrasound every 4 to 6 weeks as recommended   -Continue follow up with OB and MFM as recommended   -Stay in close contact with diabetes team    Lab Results   Component Value Date    HGBA1C 6 9 (H) 04/19/2022

## 2022-04-26 NOTE — PROGRESS NOTES
Virtual Regular Visit    Verification of patient location:[place]     Patient is located in the following state in which I hold an active license [place]      Assessment/Plan:    Problem List Items Addressed This Visit        Endocrine    Gestational diabetes mellitus (GDM) in third trimester - Primary     -A1c 6 9% abnormal; goal is 5 6% or less    -6 to 12 weeks post delivery complete 2 hour glucose tolerance test to rule out diabetes  -Due to hyperglycemia; start insulin glargine Lantus; basaglar or Semglee depending on insurance formulary at 20 units at 11 PM daily   -Always have a bedtime snack with at least 15 grams of carbohydrates and 2 to 3 ounces of protein  -Try to avoid fasting for greater than 10 hours during sleep hours  -Try to follow GDM diet, 3 meals and 3 snacks a day with combination of carbohydrates and protein per meal/snack  -SMBG fasting; 2 hours post start of each meal and with hypoglycemia   -Continue reporting via glucose flowsheet   -Send a message on Monday, 5/2/2022 for readings to be reviewed  -Always have glucose available to treat hypoglycemia; use 15 by 15 rule  -NST twice a week and JENNIFER weekly  -Fetal growth ultrasound every 4 to 6 weeks as recommended   -Continue follow up with OB and MFM as recommended   -Stay in close contact with diabetes team    Lab Results   Component Value Date    HGBA1C 6 9 (H) 04/19/2022            Relevant Medications    insulin glargine (Lantus SoloStar) 100 units/mL injection pen    Insulin Pen Needle 31G X 5 MM MISC    Hyperglycemia in pregnancy     -Starting basal insulin  Relevant Medications    insulin glargine (Lantus SoloStar) 100 units/mL injection pen    Insulin Pen Needle 31G X 5 MM MISC       Other    32 weeks gestation of pregnancy    Relevant Medications    insulin glargine (Lantus SoloStar) 100 units/mL injection pen    Insulin Pen Needle 31G X 5 MM MISC    BMI 31 0-31 9,adult     -Starting weight 178 lbs    -Current weight 187 lbs   -9 lbs weight gain  -Recommended weight gain 15 to 25 lbs  -Try to follow GDM diet  Relevant Medications    insulin glargine (Lantus SoloStar) 100 units/mL injection pen    Insulin Pen Needle 31G X 5 MM MISC        Insulin pen education without returned demonstration due to virtual visit  · Insulin administration times, insulin action  · Hypoglycemia signs, symptoms and treatment  · Increase in maternal-fetal surveillance with insulin initiation  · Side effects of hyperglycemia in pregnancy including macrosomia,  hypoglycemia, polyhydramnios, pre-term labor and stillbirth       Encouraged to watch basaglar insulin pen patient education online  Quick reference for insulin pen prep and hypoglycemia patient education sent via [address]  Reason for visit is   Chief Complaint   Patient presents with    Virtual Regular Visit    Gestational Diabetes    Patient Education        Encounter provider Esther Tobar [address] [place] [address]    Provider located at [address] [place] [id number] [phone]      Recent Visits  Date Type Provider Dept   22 Telephone Itz Uribe[address]   22 [address]   Showing recent visits within past 7 days and meeting all other requirements  Today's Visits  Date Type Provider Dept   22 [address] [facility], [address] [facility] today's visits and meeting all other requirements  Future Appointments  No visits were found meeting these conditions  Showing future appointments within next 150 days and meeting all other requirements       The patient was identified by name and date of birth  Debra Leal was informed that this is a telemedicine visit and that the visit is being conducted through [facility] and patient was informed this is a secure, HIPAA-complaint platform  She agrees to proceed     My office door was closed  No one else was in the room  She acknowledged consent and understanding of privacy and security of the video platform  The patient has agreed to participate and understands they can discontinue the visit at any time  Patient is aware this is a billable service  Subjective  Debra Billings is a 31 y o  female  32 3/7 weeks gestation GDM  Having difficult time with eating 3 meals and 3 snacks a day due to lack of hunger  Will eat 1 to 2 meals a day and snacks  Goes to bed between 2 to 3 AM; wakes up at 6 AM for children then sleeps 8 AM to 1:30 to 2 PM then usually not hungry  Eats more later in the day  Testing fasting and 2 hours post meal, reporting via glucose flowsheet and hyperglycemia noted  Appointment for insulin start and agreeable to start  [facility]- mom has diabetes  HPI     Past Medical History:   Diagnosis Date    Chlamydia     Chlamydia contact, treated     Herpes simplex     Papanicolaou smear 2020    neg/neg    Trichomoniasis     Urogenital trichomoniasis        Past Surgical History:   Procedure Laterality Date     SECTION         Current Outpatient Medications   Medication Sig Dispense Refill    Blood Glucose Monitoring Suppl (OneTouch Verio Reflect) w/Device KIT Use as directed      Ferrous Sulfate (Iron) 325 (65 Fe) MG TABS Take by mouth      insulin glargine (Lantus SoloStar) 100 units/mL injection pen Inject 20 Units under the skin daily at bedtime At 11 PM daily  To be titrated  GDM  15 mL 0    Insulin Pen Needle 31G X 5 MM MISC Inject under the skin daily at bedtime Use one a day or as directed  100 each 1    ketoconazole (NIZORAL) 2 % shampoo APPLY TO THE AFFECTED AREA(S) TOPICALLY TWICE WEEKLY      OneTouch Delica Lancets 96R MISC 4 times a day    each 3    OneTouch Verio test strip Use as instructed, test blood sugar 4 times a day,  strip 3    Prenatal Vit-Fe Fumarate-FA (PRENATAL VITAMIN PO) Take by mouth       No current facility-administered medications for this visit  No Known Allergies    Review of Systems   Constitutional: Negative for fatigue and fever  Eyes: Negative for visual disturbance  Respiratory: Negative for cough and shortness of breath  Cardiovascular: Negative for chest pain and palpitations  Gastrointestinal: Negative for constipation, diarrhea, nausea and vomiting  Endocrine: Positive for polyuria  Negative for polydipsia and polyphagia  Genitourinary: Negative for difficulty urinating and vaginal bleeding  Neurological: Negative for headaches  Psychiatric/Behavioral: Positive for sleep disturbance  Video Exam  Refer to glucose flowsheet 94 to 178; fasting glucose readings in the 100's  Vitals:    04/26/22 1152   Weight: 84 8 kg (187 lb)   Height: 5' 5" (1 651 m)       Physical Exam  HENT:      Head: Normocephalic  Nose: Nose normal    Eyes:      Conjunctiva/sclera: Conjunctivae normal    Pulmonary:      Effort: Pulmonary effort is normal    Musculoskeletal:      Cervical back: Normal range of motion  Neurological:      Mental Status: She is alert and oriented to person, place, and time  Psychiatric:         Mood and Affect: Mood normal          Behavior: Behavior normal          Thought Content: Thought content normal          Judgment: Judgment normal           I spent 29 minutes directly with the patient during this visit  VIRTUAL VISIT DISCLAIMER      Debra Voss verbally agrees to participate in [employer]  Pt is aware that Virtual Care Services could be limited without vital signs or the ability to perform a full hands-on physical Siddharthaley Pallas understands she or the provider may request at any time to terminate the video visit and request the patient to seek care or treatment in person

## 2022-04-26 NOTE — PROCEDURES
Melisa Cespedes, a [id number] at [address] [facility] with an KELECHI of 6/18/2022, by Last Menstrual Period, was seen at [address] [place] [address],[address] for the following procedure(s):  ]                   NST  Baseline 140  Variability moderate  accels yes  decels no  Ctx- no    Reactive NST    Pt has appt for fu appt  Currently has no complaints

## 2022-04-27 ENCOUNTER — TELEPHONE (OUTPATIENT)
Dept: PERINATAL CARE | Facility: OTHER | Age: 37
End: 2022-04-27

## 2022-04-27 NOTE — TELEPHONE ENCOUNTER
Spoke with patient and confirmed her 5/13 nst MFM appointment had to be rescheduled  Patient verbalized understanding of new time, date and location of appointment  Patient denies further questions

## 2022-04-29 ENCOUNTER — HOSPITAL ENCOUNTER (OUTPATIENT)
Facility: HOSPITAL | Age: 37
Discharge: HOME | End: 2022-04-29
Attending: OBSTETRICS & GYNECOLOGY | Admitting: OBSTETRICS & GYNECOLOGY
Payer: COMMERCIAL

## 2022-04-29 ENCOUNTER — HOSPITAL ENCOUNTER (OUTPATIENT)
Dept: LABOR AND DELIVERY | Facility: HOSPITAL | Age: 37
Discharge: HOME | End: 2022-04-29
Payer: COMMERCIAL

## 2022-04-29 VITALS
RESPIRATION RATE: 16 BRPM | SYSTOLIC BLOOD PRESSURE: 112 MMHG | TEMPERATURE: 98.1 F | HEART RATE: 89 BPM | DIASTOLIC BLOOD PRESSURE: 67 MMHG

## 2022-04-29 PROCEDURE — 99212 OFFICE O/P EST SF 10 MIN: CPT

## 2022-04-29 PROCEDURE — 59025 FETAL NON-STRESS TEST: CPT | Performed by: OBSTETRICS & GYNECOLOGY

## 2022-04-29 PROCEDURE — NC001 PR NO CHARGE: Performed by: OBSTETRICS & GYNECOLOGY

## 2022-04-29 NOTE — PROCEDURES
Teri Gideon, a [id number] at [address] [place] [address] with an KELECHI of 6/18/2022, by Last Menstrual Period, was seen at [address] [place] [address],[address] for the following procedure(s): $Procedure Type: NST]    Nonstress Test  Reason for NST: Diabetes  Variability: Moderate  Decelerations: None  Accelerations: Yes  Acoustic Stimulator: No  Baseline: 140 BPM  Uterine Irritability: No  Contractions: Not present                   Interpretation  Nonstress Test Interpretation: Reactive  Overall Impression: Reassuring  Comments: Normal FM  JENNIFER 11cm 4 days ago during growth scan  Has appt for JENNIFER with MFM on 5/2   No pain/CTX/bleeding/LOF

## 2022-05-02 ENCOUNTER — ULTRASOUND (OUTPATIENT)
Dept: PERINATAL CARE | Facility: OTHER | Age: 37
End: 2022-05-02
Payer: COMMERCIAL

## 2022-05-02 ENCOUNTER — DOCUMENTATION (OUTPATIENT)
Dept: PERINATAL CARE | Facility: CLINIC | Age: 37
End: 2022-05-02

## 2022-05-02 VITALS
DIASTOLIC BLOOD PRESSURE: 52 MMHG | SYSTOLIC BLOOD PRESSURE: 108 MMHG | HEART RATE: 81 BPM | WEIGHT: 184.6 LBS | HEIGHT: 65 IN | BODY MASS INDEX: 30.75 KG/M2

## 2022-05-02 DIAGNOSIS — O24.414 INSULIN CONTROLLED GESTATIONAL DIABETES MELLITUS (GDM) IN THIRD TRIMESTER: Primary | ICD-10-CM

## 2022-05-02 DIAGNOSIS — Z3A.33 33 WEEKS GESTATION OF PREGNANCY: ICD-10-CM

## 2022-05-02 PROCEDURE — 59025 FETAL NON-STRESS TEST: CPT | Performed by: OBSTETRICS & GYNECOLOGY

## 2022-05-02 PROCEDURE — 76815 OB US LIMITED FETUS(S): CPT | Performed by: OBSTETRICS & GYNECOLOGY

## 2022-05-02 NOTE — LETTER
NST sleeve cover sheet    Patient name: Alie Alves  : 1985  MRN: [id number]    KELECHI: Estimated Date of Delivery: 22    Obstetrician: _____________[place]____________    Reason(s) for testing:  _______________A2GDM_______________      Testing frequency:    __x_ 2x/wk  ___ 1x/wk  ___ Dopplers  ___ BPP?       Last growth scan: __________________________________________

## 2022-05-02 NOTE — PROGRESS NOTES
Date: 05/02/22  Teri Meneses  1985  Estimated Date of Delivery: 6/18/22  33w2d  OB/GYN: [facility]   GDM in pregnancy method of control unspecified  Additional Pregnancy Complications: Obesity; 1 hr  mg/dl        @2:30 pm spoke with patient over the phone  Patient started her Lantus 20 units at bedtime daily on 04/26/2022  Increase Lantus from 20 up to 24 units at 11 pm daily  - report twice a week for now  Asked to report by the end of this week until FBS are controlled  Follow up also scheduled on 5/3/22 to discuss blood sugars and also food diary    Diet: : 2200 calorie [id number]     PRO: 3-2-3/4-2-3/4-2/3    Avoid foods not recommended on meal plan such as fried foods, desserts, juice and sugar sweetened soda  Gestational diabetes meal plan; 3 meals and 3 snacks  Testing blood sugars: 4 x per day (Fasting, 2 hour after start of each meal)  Meter:  OneTouch Verio Reflect  Activity: Walks 30 minutes daily, follow OB recommendations  Support System: Significant Other / family   Patient Goal: "I will eat 3 meals and 3 snacks each day, including protein at each"  Education:  Class 1 completed with Jefferson Wbeb RN on 4/6/22  Class 2 is scheduled with George Durham with on 4/19/22     RENEE 04/26/2022 NEXT 05/24/2022  Follow up with Jesica scheduled for 05/03/2022    Weight Change:    Pre-gravid weight: 80 7 kg (178 lb)  2 994 kg (6 lb 9 6 oz)    Labs  4/19/22 HGA1c 6 9%  CMP completed     Ultrasounds  Date: 01/31/2022 Level 2  04/25/2022 Normal growth and JENNIFER   EFW: 80 % AC: 97 % HC: 38 %  Next US date: 05/23/2022    Further fetal surveillance  NST / JENNIFER twice a week    Camilla Rosenbaum RN

## 2022-05-02 NOTE — LETTER
Debra [place] Ramy Sydney Ramy Leone Askew  MRN: [id number] MRN: [id number] MRN: [id number]  : [birth year]  KELECHI/GA: KELECHI/GA: KELECHI/GA:  Date:  Date:  Date:     Suzette Blush  MRN: [id number] MRN: [id number] MRN: [id number]  : [birth year]  KELECHI/GA: KELECHI/GA: KELECHI/GA:  Date:  Date:  Date:     Suzette Blush  MRN: [id number] MRN: [id number] MRN: [id number]  : [birth year]  KELECHI/GA: KELECHI/GA: KELECHI/GA:  Date:  Date:  Date:     Suzette Blush  MRN: [id number] MRN: [id number] MRN: [id number]  : [birth year]  KELECHI/GA: KELECHI/GA: KELECHI/GA:  Date:  Date:  Date:     Suzette Blush  MRN: [id number] MRN: [id number] MRN: [id number]  : [birth year]  KELECHI/GA: KELECHI/GA: KELECHI/GA:  Date:  Date:  Date:     Suzette Blush  MRN: [id number] MRN: [id number] MRN: [id number]  : [birth year]  KELECHI/GA: KELECHI/GA: KELECHI/GA:  Date:  Date:  Date:     Suzette Blush  MRN: [id number] MRN: [id number] MRN: [id number]  : [birth year]  KELECHI/GA: KELECHI/GA: KELECHI/GA:  Date:  Date:  Date:

## 2022-05-03 ENCOUNTER — TELEMEDICINE (OUTPATIENT)
Dept: PERINATAL CARE | Facility: CLINIC | Age: 37
End: 2022-05-03
Payer: COMMERCIAL

## 2022-05-03 ENCOUNTER — ROUTINE PRENATAL (OUTPATIENT)
Dept: OBGYN CLINIC | Facility: CLINIC | Age: 37
End: 2022-05-03
Payer: COMMERCIAL

## 2022-05-03 VITALS — SYSTOLIC BLOOD PRESSURE: 106 MMHG | BODY MASS INDEX: 30.62 KG/M2 | WEIGHT: 184 LBS | DIASTOLIC BLOOD PRESSURE: 60 MMHG

## 2022-05-03 DIAGNOSIS — O99.013 ANEMIA DURING PREGNANCY IN THIRD TRIMESTER: ICD-10-CM

## 2022-05-03 DIAGNOSIS — E66.3 OVERWEIGHT (BMI 25.0-29.9): ICD-10-CM

## 2022-05-03 DIAGNOSIS — O34.211 MATERNAL CARE DUE TO LOW TRANSVERSE UTERINE SCAR FROM PREVIOUS CESAREAN DELIVERY: ICD-10-CM

## 2022-05-03 DIAGNOSIS — O99.810 HYPERGLYCEMIA IN PREGNANCY: ICD-10-CM

## 2022-05-03 DIAGNOSIS — B00.9 HERPES SIMPLEX: ICD-10-CM

## 2022-05-03 DIAGNOSIS — Z3A.33 33 WEEKS GESTATION OF PREGNANCY: ICD-10-CM

## 2022-05-03 DIAGNOSIS — O24.414 INSULIN CONTROLLED GESTATIONAL DIABETES MELLITUS (GDM) IN THIRD TRIMESTER: Primary | ICD-10-CM

## 2022-05-03 DIAGNOSIS — Z98.891 HISTORY OF VBAC: ICD-10-CM

## 2022-05-03 DIAGNOSIS — O24.419 GESTATIONAL DIABETES MELLITUS (GDM) IN THIRD TRIMESTER, GESTATIONAL DIABETES METHOD OF CONTROL UNSPECIFIED: Primary | ICD-10-CM

## 2022-05-03 LAB
SL AMB  POCT GLUCOSE, UA: NORMAL
SL AMB POCT URINE PROTEIN: NORMAL

## 2022-05-03 PROCEDURE — 81002 URINALYSIS NONAUTO W/O SCOPE: CPT | Performed by: OBSTETRICS & GYNECOLOGY

## 2022-05-03 PROCEDURE — 99213 OFFICE O/P EST LOW 20 MIN: CPT | Performed by: OBSTETRICS & GYNECOLOGY

## 2022-05-03 PROCEDURE — G0108 DIAB MANAGE TRN  PER INDIV: HCPCS

## 2022-05-03 RX ORDER — CYANOCOBALAMIN (VITAMIN B-12) 500 MCG
1 TABLET ORAL DAILY
Qty: 30 CAPSULE | Refills: 12 | Status: SHIPPED | OUTPATIENT
Start: 2022-05-03

## 2022-05-03 RX ORDER — FERROUS SULFATE TAB EC 324 MG (65 MG FE EQUIVALENT) 324 (65 FE) MG
324 TABLET DELAYED RESPONSE ORAL
Qty: 60 TABLET | Refills: 1 | Status: ON HOLD | OUTPATIENT
Start: 2022-05-03 | End: 2022-06-05 | Stop reason: SDUPTHER

## 2022-05-03 NOTE — PROGRESS NOTES
Virtual Regular Visit    Verification of patient location:    Patient is located in the following state in which I hold an active license [place]      Assessment/Plan:    Problem List Items Addressed This Visit        Endocrine    Gestational diabetes mellitus (GDM) in third trimester - Primary    Hyperglycemia in pregnancy       Other    32 weeks gestation of pregnancy      Other Visit Diagnoses     Overweight (BMI 25 0-29  9)                   Reason for visit is   Chief Complaint   Patient presents with    Gestational Diabetes    Patient Education    Virtual Regular Visit        Encounter provider Corby Fritz    Provider located at [address] [place] [id number] [phone]      Recent Visits  Date Type Provider Dept   04/26/22 [address] [facility], [address] [place] [address],[address]   Showing recent visits within past 7 days and meeting all other requirements  Today's Visits  Date Type Provider Dept   05/03/22 1401 [address]   Showing today's visits and meeting all other requirements  Future Appointments  No visits were found meeting these conditions  Showing future appointments within next 150 days and meeting all other requirements       The patient was identified by name and date of birth  Debra Springer was informed that this is a telemedicine visit and that the visit is being conducted through [facility] and patient was informed this is a secure, HIPAA-complaint platform  She agrees to proceed     My office door was closed  No one else was in the room  She acknowledged consent and understanding of privacy and security of the video platform  The patient has agreed to participate and understands they can discontinue the visit at any time  Patient is aware this is a billable service  Subjective  Debra Springer is a 31 y  o pregnant female         HPI     Past Medical History:   Diagnosis Date    Chlamydia     Chlamydia contact, treated     Herpes simplex     Papanicolaou smear 2020    neg/neg    Trichomoniasis     Urogenital trichomoniasis        Past Surgical History:   Procedure Laterality Date     SECTION         Current Outpatient Medications   Medication Sig Dispense Refill    Blood Glucose Monitoring Suppl (OneTouch Verio Reflect) w/Device KIT Use as directed      Ferrous Sulfate (Iron) 325 (65 Fe) MG TABS Take by mouth      insulin glargine (Lantus SoloStar) 100 units/mL injection pen Inject 20 Units under the skin daily at bedtime At 11 PM daily  To be titrated  GDM  15 mL 0    Insulin Pen Needle 31G X 5 MM MISC Inject under the skin daily at bedtime Use one a day or as directed  100 each 1    ketoconazole (NIZORAL) 2 % shampoo APPLY TO THE AFFECTED AREA(S) TOPICALLY TWICE WEEKLY      OneTouch Delica Lancets 70X MISC 4 times a day   each 3    OneTouch Verio test strip Use as instructed, test blood sugar 4 times a day,  strip 3    Prenatal Vit-Fe Fumarate-FA (PRENATAL VITAMIN PO) Take by mouth       No current facility-administered medications for this visit  No Known Allergies    Review of Systems    Video Exam    There were no vitals filed for this visit  Physical Exam     I spent 30 minutes with patient today in which greater than 50% of the time was spent in counseling/coordination of care regarding gestational diabetes    VIRTUAL VISIT DISCLAIMER      Selena Vonne Eisenmenger verbally agrees to participate in [employer]  Pt is aware that Virtual Care Services could be limited without vital signs or the ability to perform a full hands-on physical [facility] understands she or the provider may request at any time to terminate the video visit and request the patient to seek care or treatment in person          Thank you for referring your patient to [facility] Maternal Fetal Medicine Diabetes in Pregnancy Program      Sharilyn Favre is a  31 y o  female who presents today for Class 2  Patient is at 33w3d gestation, Estimated Date of Delivery: 22  Reviewed and updated the following from patients medical record: PMH, Problem List, Allergies, and Current Medications  Visit Diagnosis:  GDM in pregnancy method of control unspecified    Additional Pregnancy Complications:  overweight prior to pregnancy    Labs:    Lab Results   Component Value Date    [id number] 235 (H) 2022       No results found for: Jessenia Hoang     22 HGA1C 6 9%    Medications:  Lantus recently increased yesterday 22 from 20 to 24 units (patient reported she is consistently injecting the same time nightly at 9:30 PM)    Anthropometrics:  Ht Readings from Last 3 Encounters:   22 5' 5" (1 651 m)   22 5' 5" (1 651 m)   22 5' 5" (1 651 m)     Wt Readings from Last 3 Encounters:   22 83 7 kg (184 lb 9 6 oz)   22 84 8 kg (187 lb)   22 84 8 kg (187 lb)     Pre-gravid weight: 80 7 kg (178 lb)  Pre-gravid BMI: 29 62  Weight Change: 2 994 kg (6 lb 9 6 oz)  Weight gain recommendations: BMI (25-29 9) 15-25 lbs  Comments: Patient may gain up to 9-19 pounds for the duration of her pregnancy    Recent Ultra Sound Results:  Date: 2022 Level 2  2022 Normal growth and JENNIFER              EFW: 80 %        AC: 97 %           HC: 38 %  Next  date: 2022    Blood Glucose Monitoring:  Reinforcement of blood glucose goals and reporting guidelines       Glucose Meter: OneTouch Verio Reflect  Testing blood sugars: 4 x per day (Fasting, 2 hour after start of each meal)  Method of reporting blood sugars:Glucose Flowsheet    Report blood sugar results weekly via:  Phone: [phone]   OR  My Chart (Message with image attachment, or Glucose Flowsheet)    Goal Blood Sugar Ranges:   Fastin-90 mg/dL  1 hour after the start of each meal: 140 mg/dL or less  2 hours after start of each meal: 120 mg/dL or less      BG Log:  Please see 22 most recently completed blood glucose log  Patient reporting having injected last night as advised increased Lantus- 24 units at 9:30 PM as advised  5/3/22 FB mg/dl  Meal Plan:  Calories: 2200 calorie ([id number]) (PRO: 3-2-3/4-2-3/4-2)    Diet Type: Regular  Additional Nutrition concerns: patient reported decreased appetite  She is trying to eat main meals and sometimes snacks  Patient is still awaiting Glucerna delivery from [facility] office in [facility]  In-basket message went to Rod Lambert MA to check regarding Glucerna prescription and anticipated delivery  Diet Instruction:  The patient was instructed on the followin  Individualized meal plan  2  Importance of consistent carbohydrate intake via 3 meals and 3 snacks per day  Change bedtime snack to 1 serving CHO (15 gms) and 2-3 ounces of protein  3  Importance of protein as it relates to blood glucose control  4  Encouraged  patient to eat every 2 0-3 5 hours while awake  5  Encouraged patient to go no longer than 8-10 hours fasting overnight until first meal of the day  6  Provided suggested meal/snack options to increase nutrition and maintain consistent meal and snack intakes  7  Advised patient to try Glucerna shake for snacks and can also be used as a meal replacement with a piece of fruit and an additional protein serving  8  Patient has discontinued soda, regular sugar sweetened beverage, desserts and fried foods are eaten much less than usual        Physical Activity:  Discussed benefits of physical activity to optimize blood glucose control, encouraged activity at patient is physically able  Always consult a physician prior to starting an exercise program  Recommend 20-30 minutes daily  Is patient physically active? No; encouraged walking 20-30 minutes per day if there are no exercise restrictions from OB      Sick day Guidelines:   Patient advised that sickness will raise blood sugar and need to continue medication regimen as directed  If blood sugar is > 160 mg/dL twice in one day call doctor  Instructed on what to do when unable to consume normal meal plan  Hypoglycemia & Treatment Guidelines:  Reviewed what hypoglycemia is, signs and symptoms, and how to treat via the 15:15 rule  Post-Partum Guidelines:  Completion of 75 gm CHO 2 hr gtt at 6 weeks post-partum to check for Type 2 DM diagnosis    Breastfeeding Guidelines:  Continue GDM meal plan plus additional 350-500 calories daily  Stay hydrated by drinking 8-10 (8 oz ) fluids daily  Examples of protein and carbohydrate snacks provided  Dining Out & Travel Guidelines:  Patient advised to be prepared with extra diabetes supplies, medications, and snacks, as well as sticking to the same time schedule and portions eaten at home for meals and snacks  Maternal-Fetal Testing:    Ultrasounds- growth scans every 4 weeks  NST- twice weekly starting at 32nd week GA   JENNIFER- weekly starting at 32 weeks GA    Patient Stated Goal: "I will eat 3 meals and 3 snacks each day, including protein at each"  Goal Assessment: On track    Diabetes Self Management Support Plan outside of ongoing care: Spouse/Family    Learner/s Present:Learners Present: Patient   Barriers to Learning/Change: No Barriers; noted patient is following up as scheduled with virtual visits and diet; SMBG and insulin injections  Expected Compliance: good    Date to report blood sugars: Requested patient report blood sugars weekly  Follow up date: 5/24/22 with RENEE Shaw virtual visit    Begin Time: 10:45 am  End Time: 11:15 am     It was a pleasure working with them today  Please feel free to call with any questions or concerns      Jitendra Stoddard, ANUJ,LDN,CDE  Diabetes Educator  [facility]  Diabetes in Pregnancy Program  [address] [place] [address],[address]  [address]

## 2022-05-03 NOTE — ASSESSMENT & PLAN NOTE
Discussed with pt recommend suppression at 36 weeks  Pt states she does not wish to take suppression - her diagnosis is unconfirmed, as PCR in past negative  She states her last symptoms were 2020 - when testing was negative in [facility] office  Discussed w/ patient rationale for suppression in patient's with a history HSV - suppression at 36 weeks decreases chance of outbreak at time of delivery and also any asymptomatic shedding, which could be a risk to the baby at delivery if vaginal - risks include  HSV affecting skin, eyes and mouth, nervous system disease or systemic disease  If any symptoms c/w outbreak or signs of outbreak at time of delivery a  will be recommended  Pt states she understands but given she has not actually been diagnoses, she does not want to take Valtrex suppression

## 2022-05-03 NOTE — PROGRESS NOTES
Routine Prenatal Visit  [address], [facility], [address] [facility]    Assessment/Plan:  Aleks Newman is a 31y o  year old [id number] at 33w3d who presents for routine prenatal visit  1  Insulin controlled gestational diabetes mellitus (GDM) in third trimester  Assessment & Plan:  Patient states fasting blood sugars are above 100, working with DIPP on managing insulin dose  She reports fasting better  Getting NSTs with MFM  Growth u/s next 2022    Lab Results   Component Value Date    HGBA1C 6 9 (H) 2022         2  Anemia during pregnancy in third trimester  Assessment & Plan:  Taking PO iron  Order given to repeat CBC and make sure adequate increase  If not t/c IV iron  Orders:  -     CBC and Platelet; Future  -     CBC and Platelet  -     ferrous sulfate 324 (65 Fe) mg; Take 1 tablet (324 mg total) by mouth 2 (two) times a day before meals    3  Herpes simplex  Assessment & Plan:  Discussed with pt recommend suppression at 36 weeks  Pt states she does not wish to take suppression - her diagnosis is unconfirmed, as PCR in past negative  She states her last symptoms were 2020 - when testing was negative in [facility] office  Discussed w/ patient rationale for suppression in patient's with a history HSV - suppression at 36 weeks decreases chance of outbreak at time of delivery and also any asymptomatic shedding, which could be a risk to the baby at delivery if vaginal - risks include  HSV affecting skin, eyes and mouth, nervous system disease or systemic disease  If any symptoms c/w outbreak or signs of outbreak at time of delivery a  will be recommended  Pt states she understands but given she has not actually been diagnoses, she does not want to take Valtrex suppression  4  History of     5   Maternal care due to low transverse uterine scar from previous  delivery  Assessment & Plan:  H/o , plans TOLAC for this pregnancy  6  33 weeks gestation of pregnancy  -     POCT urine dip  -     Prenatal Vit-Fe Sulfate-FA-DHA (Ultra Prenatal + DHA) 27-0 8-200 MG CAPS; Take 1 tablet by mouth in the morning      Next OB Visit 2 weeks  Subjective:     CC: Prenatal care    Yohana German is a 31 y o  [id number] female who presents for routine prenatal care at 33w3d  Pregnancy ROS: no leakage of fluid, pelvic pain, or vaginal bleeding  normal fetal movement  The following portions of the patient's history were reviewed and updated as appropriate: allergies, current medications, past family history, past medical history, obstetric history, gynecologic history, past social history, past surgical history and problem list       Objective:  /60   Wt 83 5 kg (184 lb)   LMP 2021 (Exact Date)   Breastfeeding No   BMI 30 62 kg/m²   Pregravid Weight/BMI: 80 7 kg (178 lb) (BMI 29 62)  Current Weight: 83 5 kg (184 lb)   Total Weight Gain: 2 722 kg (6 lb)   Pre- Vitals      Most Recent Value   Prenatal Assessment    Fetal Heart Rate 140   Fundal Height (cm) 36 cm   Movement Present   Presentation Vertex   Prenatal Vitals    Blood Pressure 106/60   Weight - Scale 83 5 kg (184 lb)   Urine Albumin/Glucose    Dilation/Effacement/Station    Vaginal Drainage    Edema            General: Well appearing, no distress  Abdomen: Soft, gravid, nontender  Fundal Height: Appropriate for gestational age  Extremities: Warm and well perfused  Non tender

## 2022-05-03 NOTE — ASSESSMENT & PLAN NOTE
Patient states fasting blood sugars are above 100, working with DIPP on managing insulin dose  She reports fasting better  Getting NSTs with MFM    Growth u/s next 5/23/2022    Lab Results   Component Value Date    HGBA1C 6 9 (H) 04/19/2022

## 2022-05-05 ENCOUNTER — DOCUMENTATION (OUTPATIENT)
Dept: PERINATAL CARE | Facility: CLINIC | Age: 37
End: 2022-05-05

## 2022-05-05 NOTE — PROGRESS NOTES
Date: 05/05/22  Eladio Roa  1985  Estimated Date of Delivery: 6/18/22  33w5d  OB/GYN: Simone Roldan   GDM in pregnancy method of control unspecified  Additional Pregnancy Complications: Obesity; 1 hr  mg/dl          Noted on 5/2/22 Lantus increased to  24 units at 9:30 pm daily  Discussed insulin regimen with RENEE today  Increase Lantus from 24 to 28 units at 9:30 PM    Suggested patient check 3 am blood sugar for next 3 days and target range is 60-100mg/dl  Diet: : 2200 calorie [id number]     PRO: 3-2-3/4-2-3/4-2/3  Nutrition concerns: patient reported a decreased appetite and finds it difficult to include snacks between meals  Trying to eat a bedtime snack  Received Glucerna from [facility] office  Advised to have bedtime snack of 1 Glucerna shake and an additional protein serving  Avoid foods not recommended on meal plan such as fried foods, desserts, juice and sugar sweetened soda  Gestational diabetes meal plan; 3 meals and 3 snacks  Testing blood sugars: 4 x per day (Fasting, 2 hour after start of each meal)  Meter:  OneTouch Verio Reflect  Activity: Walks 30 minutes daily, follow OB recommendations  Support System: Significant Other / family   Patient Goal: "I will eat 3 meals and 3 snacks each day, including protein at each"  Education:  Class 1 completed with Nikhil Velasquez RN on 4/6/22  Class 2 is scheduled with Deshawn Mason with on 4/19/22     RENEE 04/26/2022 NEXT 05/24/2022  Follow up with Jesica completed on 05/03/2022    Weight Change:    Pre-gravid weight: 80 7 kg (178 lb)  2 722 kg (6 lb)    Labs  4/19/22 HGA1c 6 9%  CMP completed     Ultrasounds  Date: 01/31/2022 Level 2  04/25/2022 Normal growth and JENNIFER   EFW: 80 % AC: 97 % HC: 38 %  Next  date: 05/23/2022    Further fetal surveillance  NST / JENNIFER twice a week    Edd Serna, RD,LDN,CDE  Diabetes Education

## 2022-05-09 ENCOUNTER — ULTRASOUND (OUTPATIENT)
Dept: PERINATAL CARE | Facility: OTHER | Age: 37
End: 2022-05-09
Payer: COMMERCIAL

## 2022-05-09 VITALS
BODY MASS INDEX: 32.06 KG/M2 | SYSTOLIC BLOOD PRESSURE: 96 MMHG | HEART RATE: 91 BPM | WEIGHT: 187.8 LBS | HEIGHT: 64 IN | DIASTOLIC BLOOD PRESSURE: 54 MMHG

## 2022-05-09 DIAGNOSIS — O24.414 INSULIN CONTROLLED GESTATIONAL DIABETES MELLITUS (GDM) IN THIRD TRIMESTER: ICD-10-CM

## 2022-05-09 DIAGNOSIS — Z3A.34 34 WEEKS GESTATION OF PREGNANCY: Primary | ICD-10-CM

## 2022-05-09 PROCEDURE — 59025 FETAL NON-STRESS TEST: CPT | Performed by: OBSTETRICS & GYNECOLOGY

## 2022-05-09 PROCEDURE — 76815 OB US LIMITED FETUS(S): CPT | Performed by: OBSTETRICS & GYNECOLOGY

## 2022-05-11 ENCOUNTER — DOCUMENTATION (OUTPATIENT)
Dept: PERINATAL CARE | Facility: CLINIC | Age: 37
End: 2022-05-11

## 2022-05-11 NOTE — PROGRESS NOTES
Date: 05/11/22  [place] Vasu Horton  1985  Estimated Date of Delivery: 6/18/22  34w4d  OB/GYN: Norbert Roldan   GDM in pregnancy method of control unspecified  Additional Pregnancy Complications: Obesity; 1 hr  mg/dl          Increase Lantus from 28 up to 32 units at 9:30 pm daily     -spoke on the phone with patient who confirmed she started 28 units on 05/05/2022 after Pat recommended to increase  Advised to start 32 units tonight and report on Monday 05/16/2022    Diet: : 2200 calorie [id number]     PRO: 3-2-3/4-2-3/4-2/3  Nutrition concerns: patient reported a decreased appetite and finds it difficult to include snacks between meals  Trying to eat a bedtime snack  Received Glucerna from [facility] office  Advised to have bedtime snack of 1 Glucerna shake and an additional protein serving  Avoid foods not recommended on meal plan such as fried foods, desserts, juice and sugar sweetened soda  Gestational diabetes meal plan; 3 meals and 3 snacks  Testing blood sugars: 4 x per day (Fasting, 2 hour after start of each meal)  Meter:  OneTouch Verio Reflect  Activity: Walks 30 minutes daily, follow OB recommendations  Support System: Significant Other / family   Patient Goal: "I will eat 3 meals and 3 snacks each day, including protein at each"  Education:  Class 1 completed with Kathrin Mendoza RN on 4/6/22  Class 2 is scheduled with Yina Yeung with on 4/19/22     RENEE 04/26/2022 NEXT 05/24/2022  Follow up with Jesica completed on 05/03/2022    Weight Change:    Pre-gravid weight: 80 7 kg (178 lb)  4 445 kg (9 lb 12 8 oz)    Labs  4/19/22 HGA1c 6 9%  CMP completed     Ultrasounds  Date: 01/31/2022 Level 2  04/25/2022 Normal growth and JENNIFER   EFW: 80 % AC: 97 % HC: 38 %  Next  date: 05/23/2022    Further fetal surveillance  NST / JENNIFER twice a week    Barrie Warren RN

## 2022-05-13 ENCOUNTER — ROUTINE PRENATAL (OUTPATIENT)
Dept: PERINATAL CARE | Facility: OTHER | Age: 37
End: 2022-05-13
Payer: COMMERCIAL

## 2022-05-13 VITALS
WEIGHT: 184 LBS | BODY MASS INDEX: 31.41 KG/M2 | HEART RATE: 90 BPM | DIASTOLIC BLOOD PRESSURE: 68 MMHG | HEIGHT: 64 IN | SYSTOLIC BLOOD PRESSURE: 116 MMHG

## 2022-05-13 DIAGNOSIS — Z3A.34 34 WEEKS GESTATION OF PREGNANCY: ICD-10-CM

## 2022-05-13 DIAGNOSIS — O24.414 INSULIN CONTROLLED GESTATIONAL DIABETES MELLITUS (GDM) IN THIRD TRIMESTER: Primary | ICD-10-CM

## 2022-05-13 PROCEDURE — 59025 FETAL NON-STRESS TEST: CPT | Performed by: NURSE PRACTITIONER

## 2022-05-13 NOTE — PROGRESS NOTES
[id number] [facility]: Ms Lisa Whittington was seen today for NST (found under the pregnancy episode) which I reviewed the RN assessment and agree  She is being followed by diabetic pathways   Please don't hesitate to contact our office with any concerns or questions   -RENEE Guzmán

## 2022-05-13 NOTE — PATIENT INSTRUCTIONS
Thank you for choosing us for your  care today  If you have any questions about your ultrasound or care, please do not hesitate to contact us or your primary obstetrician  Some general instructions for your pregnancy are:    Protect against coronavirus: get vaccinated - pregnant women are increased risk of severe COVID  Notify your primary care doctor if you have any symptoms  Exercise: Aim for 22 minutes per day (150 minutes per week) of regular exercise  Walking is great! Nutrition: aim for calcium-rich and iron-rich foods as well as healthy sources of protein  Learn about Preeclampsia: preeclampsia is a common, serious high blood pressure complication in pregnancy  A blood pressure of 735GHKU (systolic or top number) or 76KYMH (diastolic or bottom number) is not normal and needs evaluation by your doctor  Aspirin is sometimes prescribed in early pregnancy to prevent preeclampsia in women with risk factors - ask your obstetrician if you should be on this medication  If you smoke, try to reduce how many cigarettes you smoke or try to quit completely  Do not vape  Other warning signs to watch out for in pregnancy or postpartum: chest pain, obstructed breathing or shortness of breath, seizures, thoughts of hurting yourself or your baby, bleeding, a painful or swollen leg, fever, or headache (see [facility] POST-[facility] Warning Signs campaign)  If these happen call 911  Itching is also not normal in pregnancy and if you experience this, especially over your hands and feet, potentially worse at night, notify your doctors  Kick Counts in Pregnancy   WHAT YOU NEED TO KNOW:   What do I need to know about kick counts? Kick counts measure how much your baby is moving in your womb  A kick from your baby can be felt as a twist, turn, swish, roll, or jab  It is common to feel your baby kicking at 26 to 28 weeks of pregnancy  You may feel your baby kick as early as 20 weeks of pregnancy   You may want to start counting at 28 weeks  Why should I measure kick counts? Your baby's movement may provide information about your baby's health  He or she may move less, or not at all, if there are problems  Your baby may move less if he or she is not getting enough oxygen or nutrition from the placenta  Do not smoke while you are pregnant  Smoking decreases the amount of oxygen that gets to your baby  Talk to your healthcare provider if you need help to quit smoking  Tell your healthcare provider as soon as you feel a change in your baby's movements  When do I measure kick counts? Measure kick counts at the same time every day  Measure kick counts when your baby is awake and most active  Your baby may be most active in the evening  How do I measure kick counts? Check that your baby is awake before you measure kick counts  You can wake up your baby by lightly pushing on your belly, walking, or drinking something cold  Your healthcare provider may tell you different ways to measure kick counts  You may be told to do the following:  Use a chart or clock to keep track of the time you start and finish counting  Sit in a chair or lie on your left side  Place your hands on the largest part of your belly  Count until you reach 10 kicks  Write down how much time it takes to count 10 kicks  It may take 30 minutes to 2 hours to count 10 kicks  It should not take more than 2 hours to count 10 kicks  When should I contact my doctor? You feel a change in the number of kicks or movements of your baby  You feel fewer than 10 kicks within 2 hours  You have questions or concerns about your baby's movements  CARE AGREEMENT:   You have the right to help plan your care  Learn about your health condition and how it may be treated  Discuss treatment options with your healthcare providers to decide what care you want to receive  You always have the right to refuse treatment   The above information is an  only  It is not intended as medical advice for individual conditions or treatments  Talk to your doctor, nurse or pharmacist before following any medical regimen to see if it is safe and effective for you  © Copyright [address] 2022 Information is for End User's use only and may not be sold, redistributed or otherwise used for commercial purposes   All illustrations and images included in CareNotes® are the copyrighted property of [employer]  or [address] [place] [address]

## 2022-05-16 ENCOUNTER — TELEPHONE (OUTPATIENT)
Dept: PERINATAL CARE | Facility: CLINIC | Age: 37
End: 2022-05-16

## 2022-05-16 NOTE — TELEPHONE ENCOUNTER
Patient called to cancel her nst/milad for today  I added her fluid to her Friday appointment and she will be checking to see if enrique can send her to the hospital for a non stress test before she comes to see us

## 2022-05-17 ENCOUNTER — HOSPITAL ENCOUNTER (OUTPATIENT)
Facility: HOSPITAL | Age: 37
Discharge: HOME | End: 2022-05-17
Attending: STUDENT IN AN ORGANIZED HEALTH CARE EDUCATION/TRAINING PROGRAM | Admitting: STUDENT IN AN ORGANIZED HEALTH CARE EDUCATION/TRAINING PROGRAM
Payer: COMMERCIAL

## 2022-05-17 ENCOUNTER — HOSPITAL ENCOUNTER (OUTPATIENT)
Dept: LABOR AND DELIVERY | Facility: HOSPITAL | Age: 37
Discharge: HOME | End: 2022-05-17
Payer: COMMERCIAL

## 2022-05-17 VITALS
HEART RATE: 87 BPM | RESPIRATION RATE: 16 BRPM | DIASTOLIC BLOOD PRESSURE: 66 MMHG | SYSTOLIC BLOOD PRESSURE: 114 MMHG | TEMPERATURE: 97.4 F

## 2022-05-17 PROBLEM — O24.414 INSULIN CONTROLLED GESTATIONAL DIABETES MELLITUS (GDM) IN THIRD TRIMESTER: Status: ACTIVE | Noted: 2022-03-31

## 2022-05-17 PROCEDURE — 59025 FETAL NON-STRESS TEST: CPT | Performed by: OBSTETRICS & GYNECOLOGY

## 2022-05-17 PROCEDURE — NC001 PR NO CHARGE: Performed by: OBSTETRICS & GYNECOLOGY

## 2022-05-17 PROCEDURE — 76815 OB US LIMITED FETUS(S): CPT | Performed by: OBSTETRICS & GYNECOLOGY

## 2022-05-17 PROCEDURE — 99212 OFFICE O/P EST SF 10 MIN: CPT

## 2022-05-17 NOTE — PROCEDURES
Jyoti Osullivan, varghese [id number] at 35w3d with an KELECHI of 6/18/2022, by Last Menstrual Period, was seen at [address] [facility] for the following procedure(s): $Procedure Type: JENNIFER, NST]    Nonstress Test  Reason for NST: Diabetes (GDM A2 - on 42 units Lantus qhs, fasting today 104)  Variability: Moderate  Decelerations: None  Accelerations: Yes  Acoustic Stimulator: No  Baseline: 130 BPM  Uterine Irritability: No  Contractions: Not present  Contraction Frequency (minutes): 0 min    4 Quadrant JENNIFER  JENNIFER Q1 (cm): 3 5 cm  JENNIFER Q2 (cm): 3 1 cm  JENNIFER Q3 (cm): 4 cm  JENNIFER Q4 (cm): 7 cm  JENNIFER TOTAL (cm): 17 6 cm       Interpretation  Nonstress Test Interpretation: Reactive  Overall Impression: Reassuring      Patient reports she continues to check in weekly with diabetes coordinator Rubia with blood sugar log  Dose 42 units lantus qhs increased 3 days ago  Feeling well  Feeling good fetal movement  Denies contractions/bleeding/loss of fluid  Will return Friday for NST  Continue diabetic diet  May [facility] MD  OB/GYN Hospitalist  [phone]  5/17/2022   11:16 AM

## 2022-05-18 ENCOUNTER — ROUTINE PRENATAL (OUTPATIENT)
Dept: OBGYN CLINIC | Facility: CLINIC | Age: 37
End: 2022-05-18
Payer: COMMERCIAL

## 2022-05-18 VITALS
WEIGHT: 189 LBS | SYSTOLIC BLOOD PRESSURE: 104 MMHG | BODY MASS INDEX: 32.27 KG/M2 | HEIGHT: 64 IN | DIASTOLIC BLOOD PRESSURE: 64 MMHG

## 2022-05-18 DIAGNOSIS — O34.211 MATERNAL CARE DUE TO LOW TRANSVERSE UTERINE SCAR FROM PREVIOUS CESAREAN DELIVERY: ICD-10-CM

## 2022-05-18 DIAGNOSIS — B00.9 HERPES SIMPLEX: ICD-10-CM

## 2022-05-18 DIAGNOSIS — Z3A.35 35 WEEKS GESTATION OF PREGNANCY: Primary | ICD-10-CM

## 2022-05-18 DIAGNOSIS — O99.013 ANEMIA DURING PREGNANCY IN THIRD TRIMESTER: ICD-10-CM

## 2022-05-18 DIAGNOSIS — O24.414 INSULIN CONTROLLED GESTATIONAL DIABETES MELLITUS (GDM) IN THIRD TRIMESTER: ICD-10-CM

## 2022-05-18 LAB
SL AMB  POCT GLUCOSE, UA: ABNORMAL
SL AMB POCT URINE PROTEIN: ABNORMAL

## 2022-05-18 PROCEDURE — 81002 URINALYSIS NONAUTO W/O SCOPE: CPT | Performed by: STUDENT IN AN ORGANIZED HEALTH CARE EDUCATION/TRAINING PROGRAM

## 2022-05-18 PROCEDURE — PNV: Performed by: STUDENT IN AN ORGANIZED HEALTH CARE EDUCATION/TRAINING PROGRAM

## 2022-05-18 NOTE — PROGRESS NOTES
Routine Prenatal Visit  [address], [address], [facility], [address] [facility]    Assessment/Plan:  Henri Taylor is a 31y o  year old [id number] at 35w4d who presents for routine prenatal visit  1  35 weeks gestation of pregnancy  Assessment & Plan:  - PTL/PPROM/Bleeding precautions given  Kick counts reviewed  - Reviewed plan for collection of GBS swab at 36 weeks  - Problem list updated, results console reviewed and updated with pertinent prenatal labs  - PMH, PSH, medications reviewed and updated as needed  - Return to office in 1 wk(s) for routine prenatal care      Orders:  -     POCT urine dip    2  Herpes simplex  Assessment & Plan:  - Questionable diagnosis-- unconfirmed by laboratory testing  Given low risk of valtrex suppression versus risk of  complications of HSV, suppression is recommended  Reviewed again today  Patient declines  Patient instructed not to shave/wax prior to delivery in order to prevent causing any questionable abrasions/lesions  She is aware that if there is concern for HSV lesion at time of presentation for delivery,  will be recommended  3  Insulin controlled gestational diabetes mellitus (GDM) in third trimester  Assessment & Plan:  - Lantus recently increased by DIPP  Patient will continue to communicate BGs to DIPP  - Continue outpatient  testing, as scheduled  Lab Results   Component Value Date    HGBA1C 6 9 (H) 2022         4  Anemia during pregnancy in third trimester  Assessment & Plan:  - Has not yet completed repeat CBC  Patient instructed to have drawn ASAP in order to facilitate initiation of IV iron, if indicated  5  BMI 31 0-31 9,adult    6  Maternal care due to low transverse uterine scar from previous  delivery  Assessment & Plan:  - Plans TOLAC  Good candidate, given hx              Subjective:   Lisandro Eden is a 31 y o  [id number] who presents for routine prenatal care at 35w4d  Complaints today: None  LOF: No; VB: No; Contractions: No; FM: Present    Objective:  /64   Ht 5' 4" (1 626 m)   Wt 85 7 kg (189 lb)   LMP 2021 (Exact Date)   BMI 32 44 kg/m²     General: Well appearing, no distress  Respiratory: Unlabored breathing  Cardiovascular: Regular rate  Abdomen: Soft, gravid, nontender  Extremities: Warm and well perfused  Non tender      Pregravid Weight/BMI: 80 7 kg (178 lb) (BMI 30 54)  Current Weight: 85 7 kg (189 lb)   Total Weight Gain: 4 99 kg (11 lb)     Pre- Vitals    Flowsheet Row Most Recent Value   Prenatal Assessment    Fetal Heart Rate 140   Fundal Height (cm) 35 cm   Movement Present   Prenatal Vitals    Blood Pressure 104/64   Weight - Scale 85 7 kg (189 lb)   Urine Albumin/Glucose    Dilation/Effacement/Station    Vaginal Drainage    Draining Fluid No   Edema    LLE Edema None   RLE Edema None   Facial Edema None           Thomas Soliz MD  2022 10:28 AM

## 2022-05-18 NOTE — ASSESSMENT & PLAN NOTE
- Questionable diagnosis-- unconfirmed by laboratory testing  Given low risk of valtrex suppression versus risk of  complications of HSV, suppression is recommended  Reviewed again today  Patient declines  Patient instructed not to shave/wax prior to delivery in order to prevent causing any questionable abrasions/lesions  She is aware that if there is concern for HSV lesion at time of presentation for delivery,  will be recommended

## 2022-05-18 NOTE — ASSESSMENT & PLAN NOTE
- Lantus recently increased by DIPP  Patient will continue to communicate BGs to DIPP  - Continue outpatient  testing, as scheduled       Lab Results   Component Value Date    HGBA1C 6 9 (H) 2022

## 2022-05-18 NOTE — ASSESSMENT & PLAN NOTE
- PTL/PPROM/Bleeding precautions given  Kick counts reviewed  - Reviewed plan for collection of GBS swab at 36 weeks  - Problem list updated, results console reviewed and updated with pertinent prenatal labs    - PMH, PSH, medications reviewed and updated as needed  - Return to office in 1 wk(s) for routine prenatal care

## 2022-05-18 NOTE — ASSESSMENT & PLAN NOTE
- Has not yet completed repeat CBC  Patient instructed to have drawn ASAP in order to facilitate initiation of IV iron, if indicated

## 2022-05-19 LAB
ERYTHROCYTE [DISTWIDTH] IN BLOOD BY AUTOMATED COUNT: 13.7 % (ref 11.7–15.4)
HCT VFR BLD AUTO: 30.1 % (ref 34–46.6)
HGB BLD-MCNC: 10.2 G/DL (ref 11.1–15.9)
MCH RBC QN AUTO: 26.6 PG (ref 26.6–33)
MCHC RBC AUTO-ENTMCNC: 33.9 G/DL (ref 31.5–35.7)
MCV RBC AUTO: 79 FL (ref 79–97)
PLATELET # BLD AUTO: 250 X10E3/UL (ref 150–450)
RBC # BLD AUTO: 3.83 X10E6/UL (ref 3.77–5.28)
WBC # BLD AUTO: 6.6 X10E3/UL (ref 3.4–10.8)

## 2022-05-24 ENCOUNTER — TELEPHONE (OUTPATIENT)
Dept: PERINATAL CARE | Facility: CLINIC | Age: 37
End: 2022-05-24

## 2022-05-24 LAB
2ND TRIMESTER 4 SCREEN SERPL-IMP: NORMAL
AFP ADJ MOM SERPL: NORMAL
AFP INTERP AMN-IMP: NORMAL
AFP INTERP SERPL-IMP: NORMAL
AFP INTERP SERPL-IMP: NORMAL
AFP SERPL-MCNC: 391 NG/ML
AGE AT DELIVERY: 34.6 YR
GA METHOD: NORMAL
GA: 35.6 WEEKS
IDDM PATIENT QL: NO
MULTIPLE PREGNANCY: NO
NEURAL TUBE DEFECT RISK FETUS: NORMAL %

## 2022-05-25 ENCOUNTER — ROUTINE PRENATAL (OUTPATIENT)
Dept: OBGYN CLINIC | Facility: CLINIC | Age: 37
End: 2022-05-25
Payer: COMMERCIAL

## 2022-05-25 VITALS
HEIGHT: 64 IN | WEIGHT: 182 LBS | BODY MASS INDEX: 31.07 KG/M2 | DIASTOLIC BLOOD PRESSURE: 70 MMHG | SYSTOLIC BLOOD PRESSURE: 110 MMHG

## 2022-05-25 DIAGNOSIS — B00.9 HERPES SIMPLEX: ICD-10-CM

## 2022-05-25 DIAGNOSIS — Z36.85 ENCOUNTER FOR ANTENATAL SCREENING FOR STREPTOCOCCUS B: ICD-10-CM

## 2022-05-25 DIAGNOSIS — O24.414 INSULIN CONTROLLED GESTATIONAL DIABETES MELLITUS (GDM) IN THIRD TRIMESTER: ICD-10-CM

## 2022-05-25 DIAGNOSIS — O34.211 MATERNAL CARE DUE TO LOW TRANSVERSE UTERINE SCAR FROM PREVIOUS CESAREAN DELIVERY: ICD-10-CM

## 2022-05-25 DIAGNOSIS — Z3A.36 36 WEEKS GESTATION OF PREGNANCY: Primary | ICD-10-CM

## 2022-05-25 DIAGNOSIS — O99.013 ANEMIA DURING PREGNANCY IN THIRD TRIMESTER: ICD-10-CM

## 2022-05-25 LAB
SL AMB  POCT GLUCOSE, UA: NORMAL
SL AMB POCT URINE PROTEIN: NORMAL

## 2022-05-25 PROCEDURE — 81002 URINALYSIS NONAUTO W/O SCOPE: CPT | Performed by: STUDENT IN AN ORGANIZED HEALTH CARE EDUCATION/TRAINING PROGRAM

## 2022-05-25 PROCEDURE — PNV: Performed by: STUDENT IN AN ORGANIZED HEALTH CARE EDUCATION/TRAINING PROGRAM

## 2022-05-25 NOTE — ASSESSMENT & PLAN NOTE
- Labor/Bleeding/ROM precautions given  Kick counts reviewed  - GBS swab collected today  - Delivery consent reviewed and signed today  Risks of delivery reviewed, including bleeding, infection, damage to surrounding organs/structures (specifically bowel, bladder, vessels, nerves, ureters), need for operative vaginal delivery or  delivery, hysterectomy, need for blood transfusion, need for further surgery  - Problem list updated, results console reviewed and updated with pertinent prenatal labs    - PMH, PSH, medications reviewed and updated as needed  - Return to office in 1 wk(s) for routine prenatal care

## 2022-05-25 NOTE — ASSESSMENT & PLAN NOTE
- Growth US scheduled for Friday  Continue NSTs with MFM  - Reports adequate BG control  Management per DIPP  Missed appointment yesterday; instructed patient to call and reschedule     Lab Results   Component Value Date    HGBA1C 6 9 (H) 04/19/2022

## 2022-05-25 NOTE — PROGRESS NOTES
Routine Prenatal Visit  [address], [address], [facility], [address] [facility]    Assessment/Plan:  Beau Chaney is a 31y o  year old [id number] at 37w2d who presents for routine prenatal visit  1  36 weeks gestation of pregnancy  Assessment & Plan:  - Labor/Bleeding/ROM precautions given  Kick counts reviewed  - GBS swab collected today  - Delivery consent reviewed and signed today  Risks of delivery reviewed, including bleeding, infection, damage to surrounding organs/structures (specifically bowel, bladder, vessels, nerves, ureters), need for operative vaginal delivery or  delivery, hysterectomy, need for blood transfusion, need for further surgery  - Problem list updated, results console reviewed and updated with pertinent prenatal labs  - PMH, PSH, medications reviewed and updated as needed  - Return to office in 1 wk(s) for routine prenatal care      Orders:  -     POCT urine dip    2  Insulin controlled gestational diabetes mellitus (GDM) in third trimester  Assessment & Plan:  - Growth US scheduled for Friday  Continue NSTs with MFM  - Reports adequate BG control  Management per DIPP  Missed appointment yesterday; instructed patient to call and reschedule  Lab Results   Component Value Date    HGBA1C 6 9 (H) 2022         3  Anemia during pregnancy in third trimester  Assessment & Plan:  - Continue oral iron supplementation      4  Encounter for  screening for Streptococcus B  -     Strep B DNA probe, amplification    5  Maternal care due to low transverse uterine scar from previous  delivery    6  Herpes simplex        Subjective:   Beau Schwartz is a 31 y o  [id number] who presents for routine prenatal care at 36w4d    Complaints today: No  LOF: No; VB: No; Contractions: No; FM: Present and normal    Objective:  /70 (BP Location: Left arm, Patient Position: Sitting, Cuff Size: Standard)   Ht 5' 4" (1 626 m)   Wt 82 6 kg (182 lb)   LMP 2021 (Exact Date)   BMI 31 24 kg/m²     General: Well appearing, no distress  Respiratory: Unlabored breathing  Cardiovascular: Regular rate  Abdomen: Soft, gravid, nontender  Extremities: Warm and well perfused  Non tender      Pregravid Weight/BMI: 80 7 kg (178 lb) (BMI 30 54)  Current Weight: 82 6 kg (182 lb)   Total Weight Gain: 1 814 kg (4 lb)     Pre-Jade Vitals    Flowsheet Row Most Recent Value   Prenatal Assessment    Fetal Heart Rate 140   Fundal Height (cm) 38 cm   Movement Present   Presentation Vertex   Prenatal Vitals    Blood Pressure 110/70   Weight - Scale 82 6 kg (182 lb)   Urine Albumin/Glucose    Dilation/Effacement/Station    Cervical Dilation 2   Cervical Effacement 50   Fetal Station -3   Vaginal Drainage    Draining Fluid No   Edema    LLE Edema None   RLE Edema None   Facial Edema None           Lisette Rao MD  2022 12:47 PM

## 2022-05-26 ENCOUNTER — TELEMEDICINE (OUTPATIENT)
Dept: PERINATAL CARE | Facility: CLINIC | Age: 37
End: 2022-05-26

## 2022-05-26 VITALS — BODY MASS INDEX: 31.07 KG/M2 | HEIGHT: 64 IN | WEIGHT: 182 LBS

## 2022-05-26 DIAGNOSIS — O24.414 INSULIN CONTROLLED GESTATIONAL DIABETES MELLITUS (GDM) IN THIRD TRIMESTER: Primary | ICD-10-CM

## 2022-05-26 DIAGNOSIS — O99.810 HYPERGLYCEMIA IN PREGNANCY: ICD-10-CM

## 2022-05-26 DIAGNOSIS — Z3A.36 36 WEEKS GESTATION OF PREGNANCY: ICD-10-CM

## 2022-05-26 RX ORDER — INSULIN GLARGINE 100 [IU]/ML
48 INJECTION, SOLUTION SUBCUTANEOUS
Qty: 15 ML | Refills: 0 | Status: SHIPPED | OUTPATIENT
Start: 2022-05-26 | End: 2022-06-05

## 2022-05-26 NOTE — ASSESSMENT & PLAN NOTE
-A1c 6 9% abnormal; goal is 5 6% or less  -Repeat A1c   -Increase Lantus to 48 units daily at 9:30 PM split dose into 2 (24 units each dose injected one after the other in 2 different sites spaced 2 to 3 inches apart)  -Try to follow GDM diet    -Always have bedtime snack   -Continue SMBG fasting and 2 hours post meal    -Continue reporting via glucose flowsheet  -NST twice week and JENNIFER weekly  -Fetal growth ultrasound as scheduled   -Continue follow up with OB and MFM  -Stay in close contact with diabetes team   -6 to 12 weeks post delivery complete 2 hour glucose tolerance test to rule out diabetes   -Post delivery continue with healthy eating and staying active     Lab Results   Component Value Date    HGBA1C 6 9 (H) 04/19/2022

## 2022-05-26 NOTE — ASSESSMENT & PLAN NOTE
-Starting weight 178 lbs  -Current weight 182 lbs  -TWG 4 lbs  -Recommended weight gain 15 to 25 lbs  -Try to follow GDM diet

## 2022-05-26 NOTE — PATIENT INSTRUCTIONS
-A1c 6 9% abnormal; goal is 5 6% or less  -Repeat A1c   -Increase Lantus to 48 units daily at 9:30 PM split dose into 2 (24 units each dose injected one after the other in 2 different sites spaced 2 to 3 inches apart)  -Try to follow GDM diet    -Always have bedtime snack   -Continue SMBG fasting and 2 hours post meal    -Continue reporting via glucose flowsheet  -NST twice week and JENNIFER weekly  -Fetal growth ultrasound as scheduled   -Continue follow up with OB and MFM  -Stay in close contact with diabetes team   -6 to 12 weeks post delivery complete 2 hour glucose tolerance test to rule out diabetes   -Post delivery continue with healthy eating and staying active

## 2022-05-26 NOTE — PROGRESS NOTES
Virtual Regular Visit    Verification of patient location:[place]    Patient is located in the following state in which I hold an active license [place]      Assessment/Plan:    Problem List Items Addressed This Visit        Endocrine    Insulin controlled gestational diabetes mellitus (GDM) in third trimester - Primary     -A1c 6 9% abnormal; goal is 5 6% or less  -Repeat A1c   -Increase Lantus to 48 units daily at 9:30 PM split dose into 2 (24 units each dose injected one after the other in 2 different sites spaced 2 to 3 inches apart)  -Try to follow GDM diet    -Always have bedtime snack   -Continue SMBG fasting and 2 hours post meal    -Continue reporting via glucose flowsheet  -NST twice week and JENNIFER weekly  -Fetal growth ultrasound as scheduled   -Continue follow up with OB and MFM  -Stay in close contact with diabetes team   -6 to 12 weeks post delivery complete 2 hour glucose tolerance test to rule out diabetes   -Post delivery continue with healthy eating and staying active  Lab Results   Component Value Date    HGBA1C 6 9 (H) 04/19/2022              Relevant Medications    insulin glargine (Lantus SoloStar) 100 units/mL injection pen    Insulin Pen Needle 31G X 5 MM MISC    Other Relevant Orders    Hemoglobin A1C    Hyperglycemia in pregnancy     -Increase Lantus to 48 units daily  Relevant Medications    insulin glargine (Lantus SoloStar) 100 units/mL injection pen    Insulin Pen Needle 31G X 5 MM MISC       Other    36 weeks gestation of pregnancy    BMI 31 0-31 9,adult     -Starting weight 178 lbs  -Current weight 182 lbs  -TWG 4 lbs  -Recommended weight gain 15 to 25 lbs  -Try to follow GDM diet              Relevant Medications    insulin glargine (Lantus SoloStar) 100 units/mL injection pen    Insulin Pen Needle 31G X 5 MM MISC               Reason for visit is   Chief Complaint   Patient presents with    Virtual Regular Visit    Gestational Diabetes        Encounter provider [place] Otf Galvan    Provider located at [facility] [address] [id number] [phone]      Recent Visits  Date Type Provider Dept   22 Telephone Mahendra Busby, [facility] recent visits within past 7 days and meeting all other requirements  Today's Visits  Date Type Provider Dept   22 Telemedicine Renea Crowley0 [facility] today's visits and meeting all other requirements  Future Appointments  No visits were found meeting these conditions  Showing future appointments within next 150 days and meeting all other requirements       The patient was identified by name and date of birth  Debra Mcmillan was informed that this is a telemedicine visit and that the visit is being conducted through [facility] and patient was informed this is a secure, HIPAA-complaint platform  She agrees to proceed     My office door was closed  No one else was in the room  She acknowledged consent and understanding of privacy and security of the video platform  The patient has agreed to participate and understands they can discontinue the visit at any time  Patient is aware this is a billable service  Subjective  Debra Mcmillan is a 31 y o  female  39 5/7 weeks gestation GDM on Lantus 42 units at 9:30 PM  Eating 2 meals and 3 snacks  Testing fasting and 2 hours post meal  Denies hypoglycemia  Positive fetal movement  Going for NSTs  Has fetal growth scheduled         HPI     Past Medical History:   Diagnosis Date    Chlamydia     Chlamydia contact, treated     Herpes simplex     Papanicolaou smear 2020    neg/neg    Trichomoniasis     Urogenital trichomoniasis        Past Surgical History:   Procedure Laterality Date     SECTION         Current Outpatient Medications   Medication Sig Dispense Refill    insulin glargine (Lantus SoloStar) 100 units/mL injection pen Inject 48 Units under the skin daily at bedtime At 9:30 PM daily  To be titrated  GDM  15 mL 0    Insulin Pen Needle 31G X 5 MM MISC Inject under the skin daily at bedtime Use 2 a day or as directed  100 each 0    Blood Glucose Monitoring Suppl (OneTouch Verio Reflect) w/Device KIT Use as directed      ferrous sulfate 324 (65 Fe) mg Take 1 tablet (324 mg total) by mouth 2 (two) times a day before meals 60 tablet 1    ketoconazole (NIZORAL) 2 % shampoo APPLY TO THE AFFECTED AREA(S) TOPICALLY TWICE WEEKLY      OneTouch Delica Lancets 05U MISC 4 times a day   each 3    OneTouch Verio test strip Use as instructed, test blood sugar 4 times a day,  strip 3    Prenatal Vit-Fe Sulfate-FA-DHA (Ultra Prenatal + DHA) 27-0 8-200 MG CAPS Take 1 tablet by mouth in the morning 30 capsule 12     No current facility-administered medications for this visit  No Known Allergies    Review of Systems   Constitutional: Negative for fatigue and fever  Eyes: Negative for visual disturbance  Respiratory: Negative for cough and shortness of breath  Cardiovascular: Negative for chest pain and palpitations  Gastrointestinal: Negative for constipation, nausea and vomiting  Endocrine: Positive for polyuria  Negative for polydipsia and polyphagia  Genitourinary: Negative for difficulty urinating and vaginal bleeding  Neurological: Negative for headaches  Psychiatric/Behavioral: Positive for sleep disturbance  Video Exam    Vitals:    05/26/22 1114   Weight: 82 6 kg (182 lb)   Height: 5' 4" (1 626 m)       Physical Exam  HENT:      Head: Normocephalic  Nose: Nose normal    Pulmonary:      Effort: Pulmonary effort is normal    Musculoskeletal:      Cervical back: Normal range of motion  Neurological:      Mental Status: She is alert and oriented to person, place, and time  Psychiatric:         Mood and Affect: Mood normal          Behavior: Behavior normal          Thought Content:  Thought content normal  Judgment: Judgment normal           I spent 22 minutes directly with the patient during this visit  VIRTUAL VISIT DISCLAIMER      Debra Barragan verbally agrees to participate in [employer]  Pt is aware that Virtual Care Services could be limited without vital signs or the ability to perform a full hands-on physical Cole Minium understands she or the provider may request at any time to terminate the video visit and request the patient to seek care or treatment in person

## 2022-05-27 ENCOUNTER — ULTRASOUND (OUTPATIENT)
Dept: PERINATAL CARE | Facility: OTHER | Age: 37
End: 2022-05-27
Payer: COMMERCIAL

## 2022-05-27 ENCOUNTER — ROUTINE PRENATAL (OUTPATIENT)
Dept: PERINATAL CARE | Facility: OTHER | Age: 37
End: 2022-05-27
Payer: COMMERCIAL

## 2022-05-27 VITALS
SYSTOLIC BLOOD PRESSURE: 118 MMHG | BODY MASS INDEX: 31.48 KG/M2 | HEART RATE: 92 BPM | HEIGHT: 64 IN | WEIGHT: 184.4 LBS | DIASTOLIC BLOOD PRESSURE: 60 MMHG

## 2022-05-27 DIAGNOSIS — O24.414 INSULIN CONTROLLED GESTATIONAL DIABETES MELLITUS (GDM) IN THIRD TRIMESTER: Primary | ICD-10-CM

## 2022-05-27 DIAGNOSIS — Z3A.36 36 WEEKS GESTATION OF PREGNANCY: ICD-10-CM

## 2022-05-27 LAB — GP B STREP DNA SPEC QL NAA+PROBE: POSITIVE

## 2022-05-27 PROCEDURE — 59025 FETAL NON-STRESS TEST: CPT | Performed by: OBSTETRICS & GYNECOLOGY

## 2022-05-27 PROCEDURE — 76816 OB US FOLLOW-UP PER FETUS: CPT | Performed by: OBSTETRICS & GYNECOLOGY

## 2022-05-27 PROCEDURE — NC001 PR NO CHARGE: Performed by: OBSTETRICS & GYNECOLOGY

## 2022-05-27 PROCEDURE — 99213 OFFICE O/P EST LOW 20 MIN: CPT | Performed by: OBSTETRICS & GYNECOLOGY

## 2022-05-27 NOTE — LETTER
May 27, 2022     MD Shanti Ferraro 84  [address] [facility] [address]  [facility] [address]    Patient: Gio Damian   YOB: 1985   Date of Visit: 5/27/2022       Dear Dr Kei Pearson:    Thank you for referring Henry Rapp to me for evaluation  Below are my notes for this consultation  If you have questions, please do not hesitate to call me  I look forward to following your patient along with you  Sincerely,        Stacia Champion MD        CC: No Recipients  Stacia Champion MD  5/25/2022  6:53 PM  Sign when Signing Visit  Please refer to the [facility] ultrasound report in Ob Procedures for additional information regarding today's visit

## 2022-06-01 ENCOUNTER — TELEPHONE (OUTPATIENT)
Dept: PERINATAL CARE | Facility: CLINIC | Age: 37
End: 2022-06-01

## 2022-06-01 NOTE — TELEPHONE ENCOUNTER
Patient called to cancel her non-stress test at 11 a m in [place]  Offered patient a later time for today and multiple appointments on Friday  Patient did not give a reason for cancelling and will call back another time to reschedule

## 2022-06-02 ENCOUNTER — DOCUMENTATION (OUTPATIENT)
Dept: PERINATAL CARE | Facility: CLINIC | Age: 37
End: 2022-06-02

## 2022-06-02 ENCOUNTER — ROUTINE PRENATAL (OUTPATIENT)
Dept: OBGYN CLINIC | Facility: CLINIC | Age: 37
End: 2022-06-02
Payer: COMMERCIAL

## 2022-06-02 VITALS
WEIGHT: 187 LBS | BODY MASS INDEX: 31.92 KG/M2 | SYSTOLIC BLOOD PRESSURE: 114 MMHG | DIASTOLIC BLOOD PRESSURE: 74 MMHG | HEIGHT: 64 IN

## 2022-06-02 DIAGNOSIS — B00.9 HERPES SIMPLEX: ICD-10-CM

## 2022-06-02 DIAGNOSIS — O34.211 MATERNAL CARE DUE TO LOW TRANSVERSE UTERINE SCAR FROM PREVIOUS CESAREAN DELIVERY: ICD-10-CM

## 2022-06-02 DIAGNOSIS — Z98.891 HISTORY OF VBAC: ICD-10-CM

## 2022-06-02 DIAGNOSIS — O99.810 HYPERGLYCEMIA IN PREGNANCY: Primary | ICD-10-CM

## 2022-06-02 DIAGNOSIS — O24.414 INSULIN CONTROLLED GESTATIONAL DIABETES MELLITUS (GDM) IN THIRD TRIMESTER: ICD-10-CM

## 2022-06-02 DIAGNOSIS — O99.013 ANEMIA DURING PREGNANCY IN THIRD TRIMESTER: ICD-10-CM

## 2022-06-02 DIAGNOSIS — Z3A.37 37 WEEKS GESTATION OF PREGNANCY: ICD-10-CM

## 2022-06-02 LAB
SL AMB  POCT GLUCOSE, UA: NORMAL
SL AMB POCT URINE PROTEIN: NORMAL

## 2022-06-02 PROCEDURE — 99213 OFFICE O/P EST LOW 20 MIN: CPT | Performed by: OBSTETRICS & GYNECOLOGY

## 2022-06-02 PROCEDURE — 81002 URINALYSIS NONAUTO W/O SCOPE: CPT | Performed by: OBSTETRICS & GYNECOLOGY

## 2022-06-02 RX ORDER — PEN NEEDLE, DIABETIC 32GX 5/32"
NEEDLE, DISPOSABLE MISCELLANEOUS
COMMUNITY
Start: 2022-05-27

## 2022-06-02 NOTE — PROGRESS NOTES
Date: 06/02/22  Edwin Amin  1985  Estimated Date of Delivery: 6/18/22  37w5d  OB/GYN: Jerry Roldan   GDM in pregnancy method of control unspecified  Additional Pregnancy Complications: Obesity; 1 hr  mg/dl          Increase Lantus from 48 up to 52 units at 9:30 pm daily  - split into 26 units each     Diet: : 2200 calorie UIE:[id number]     PRO: 3-2-3/4-2-3/4-2/3   Gestational diabetes meal plan; 3 meals and 3 snacks  Testing blood sugars: 4 x per day (Fasting, 2 hour after start of each meal)  Meter:  OneTouch Verio Reflect  Activity: Walks 30 minutes daily, follow OB recommendations  Support System: Significant Other / family   Patient Goal: "I will eat 3 meals and 3 snacks each day, including protein at each"  Education:  Class 1 completed with Linda Sims RN on 4/6/22  Class 2 is scheduled with Eligio Herr with on 4/19/22     RENEE 04/26/2022, 05/26/2022  Follow up with Shay Ma completed on 05/03/2022    Weight Change:    Pre-gravid weight: 80 7 kg (178 lb)  4 082 kg (9 lb)    Labs  4/19/22 HGA1c 6 9%  CMP completed     Ultrasounds  Date: 01/31/2022 Level 2  04/25/2022 Normal growth and JENNIFER   EFW: 80 % AC: 97 % HC: 38 %  05/27/2022 Normal growth and JENNIFER   EFW: 79 % AC: >97 % HC: 18 %    Further fetal surveillance  NST / JENNIFER twice a week    Serjio Kwong RN

## 2022-06-02 NOTE — PROGRESS NOTES
Routine Prenatal Visit  [address], [address], [facility], [address]    Assessment/Plan:  Vianney Gipson is a 31y o  year old [id number] at 37w5d who presents for routine prenatal visit  1  Hyperglycemia in pregnancy    2  BMI 31 0-31 9,adult    3  Insulin controlled gestational diabetes mellitus (GDM) in third trimester    4  History of     5  Anemia during pregnancy in third trimester    6  Maternal care due to low transverse uterine scar from previous  delivery    7  Herpes simplex    8  37 weeks gestation of pregnancy  -     POCT urine dip        Next OB Visit 1 weeks  Subjective:     CC: Prenatal care    Debra Barragan is a 31 y o  [id number] female who presents for routine prenatal care at 37w5d  Pregnancy ROS: no leakage of fluid, pelvic pain, or vaginal bleeding  nml fetal movement      The following portions of the patient's history were reviewed and updated as appropriate: allergies, current medications, past family history, past medical history, obstetric history, gynecologic history, past social history, past surgical history and problem list       Objective:  /74 (BP Location: Left arm, Patient Position: Sitting, Cuff Size: Standard)   Ht 5' 4" (1 626 m)   Wt 84 8 kg (187 lb)   LMP 2021 (Exact Date)   BMI 32 10 kg/m²   Pregravid Weight/BMI: 80 7 kg (178 lb) (BMI 30 54)  Current Weight: 84 8 kg (187 lb)   Total Weight Gain: 4 082 kg (9 lb)   Pre-Jade Vitals    Flowsheet Row Most Recent Value   Prenatal Assessment    Fetal Heart Rate 140   Fundal Height (cm) 40 cm   Movement Present   Presentation Vertex   Prenatal Vitals    Blood Pressure 114/74   Weight - Scale 84 8 kg (187 lb)   Urine Albumin/Glucose    Dilation/Effacement/Station    Vaginal Drainage    Draining Fluid No   Edema    LLE Edema None   RLE Edema None   Facial Edema None           General: Well appearing, no distress  Abdomen: Soft, gravid, nontender  Fundal Height: Appropriate for gestational age  Extremities: Warm and well perfused  Non tender

## 2022-06-03 ENCOUNTER — TELEPHONE (OUTPATIENT)
Dept: OBGYN CLINIC | Facility: CLINIC | Age: 37
End: 2022-06-03

## 2022-06-03 ENCOUNTER — HOSPITAL ENCOUNTER (INPATIENT)
Facility: HOSPITAL | Age: 37
LOS: 2 days | Discharge: HOME | DRG: 560 | End: 2022-06-05
Attending: OBSTETRICS & GYNECOLOGY | Admitting: OBSTETRICS & GYNECOLOGY
Payer: COMMERCIAL

## 2022-06-03 ENCOUNTER — ANESTHESIA EVENT (INPATIENT)
Dept: ANESTHESIOLOGY | Facility: HOSPITAL | Age: 37
DRG: 560 | End: 2022-06-03
Payer: COMMERCIAL

## 2022-06-03 ENCOUNTER — ANESTHESIA (INPATIENT)
Dept: ANESTHESIOLOGY | Facility: HOSPITAL | Age: 37
DRG: 560 | End: 2022-06-03
Payer: COMMERCIAL

## 2022-06-03 DIAGNOSIS — O99.013 ANEMIA DURING PREGNANCY IN THIRD TRIMESTER: ICD-10-CM

## 2022-06-03 DIAGNOSIS — O34.219 VBAC, DELIVERED, CURRENT HOSPITALIZATION: Primary | ICD-10-CM

## 2022-06-03 LAB
ABO GROUP BLD: NORMAL
ALBUMIN SERPL BCP-MCNC: 3 G/DL (ref 3.5–5)
ALP SERPL-CCNC: 203 U/L (ref 46–116)
ALT SERPL W P-5'-P-CCNC: 16 U/L (ref 12–78)
ANION GAP SERPL CALCULATED.3IONS-SCNC: 11 MMOL/L (ref 4–13)
AST SERPL W P-5'-P-CCNC: 15 U/L (ref 5–45)
BASE EXCESS BLDCOA CALC-SCNC: -6.4 MMOL/L (ref 3–11)
BASE EXCESS BLDCOV CALC-SCNC: -4.6 MMOL/L (ref 1–9)
BASOPHILS # BLD AUTO: 0.01 THOUSANDS/ΜL (ref 0–0.1)
BASOPHILS NFR BLD AUTO: 0 % (ref 0–1)
BILIRUB SERPL-MCNC: 0.2 MG/DL (ref 0.2–1)
BLD GP AB SCN SERPL QL: NEGATIVE
BUN SERPL-MCNC: 4 MG/DL (ref 5–25)
CALCIUM ALBUM COR SERPL-MCNC: 9.9 MG/DL (ref 8.3–10.1)
CALCIUM SERPL-MCNC: 9.1 MG/DL (ref 8.3–10.1)
CHLORIDE SERPL-SCNC: 106 MMOL/L (ref 100–108)
CO2 SERPL-SCNC: 21 MMOL/L (ref 21–32)
CREAT SERPL-MCNC: 0.59 MG/DL (ref 0.6–1.3)
EOSINOPHIL # BLD AUTO: 0.03 THOUSAND/ΜL (ref 0–0.61)
EOSINOPHIL NFR BLD AUTO: 1 % (ref 0–6)
ERYTHROCYTE [DISTWIDTH] IN BLOOD BY AUTOMATED COUNT: 14.4 % (ref 11.6–15.1)
GFR SERPL CREATININE-BSD FRML MDRD: 119 ML/MIN/1.73SQ M
GLUCOSE SERPL-MCNC: 113 MG/DL (ref 65–140)
GLUCOSE SERPL-MCNC: 119 MG/DL (ref 65–140)
GLUCOSE SERPL-MCNC: 72 MG/DL (ref 65–140)
GLUCOSE SERPL-MCNC: 83 MG/DL (ref 65–140)
GLUCOSE SERPL-MCNC: 84 MG/DL (ref 65–140)
GLUCOSE SERPL-MCNC: 85 MG/DL (ref 65–140)
GLUCOSE SERPL-MCNC: 87 MG/DL (ref 65–140)
GLUCOSE SERPL-MCNC: 87 MG/DL (ref 65–140)
GLUCOSE SERPL-MCNC: 94 MG/DL (ref 65–140)
HCO3 BLDCOA-SCNC: 21.1 MMOL/L (ref 17.3–27.3)
HCO3 BLDCOV-SCNC: 20.4 MMOL/L (ref 12.2–28.6)
HCT VFR BLD AUTO: 31.6 % (ref 34.8–46.1)
HGB BLD-MCNC: 10.1 G/DL (ref 11.5–15.4)
IMM GRANULOCYTES # BLD AUTO: 0.06 THOUSAND/UL (ref 0–0.2)
IMM GRANULOCYTES NFR BLD AUTO: 1 % (ref 0–2)
LYMPHOCYTES # BLD AUTO: 1.59 THOUSANDS/ΜL (ref 0.6–4.47)
LYMPHOCYTES NFR BLD AUTO: 28 % (ref 14–44)
MCH RBC QN AUTO: 26.9 PG (ref 26.8–34.3)
MCHC RBC AUTO-ENTMCNC: 32 G/DL (ref 31.4–37.4)
MCV RBC AUTO: 84 FL (ref 82–98)
MONOCYTES # BLD AUTO: 0.61 THOUSAND/ΜL (ref 0.17–1.22)
MONOCYTES NFR BLD AUTO: 11 % (ref 4–12)
NEUTROPHILS # BLD AUTO: 3.49 THOUSANDS/ΜL (ref 1.85–7.62)
NEUTS SEG NFR BLD AUTO: 59 % (ref 43–75)
NRBC BLD AUTO-RTO: 0 /100 WBCS
O2 CT VFR BLDCOA CALC: 9.5 ML/DL
OXYHGB MFR BLDCOA: 46.3 %
OXYHGB MFR BLDCOV: 78.6 %
PCO2 BLDCOA: 49.4 MM[HG] (ref 30–60)
PCO2 BLDCOV: 37.6 MM HG (ref 27–43)
PH BLDCOA: 7.25 [PH] (ref 7.23–7.43)
PH BLDCOV: 7.35 [PH] (ref 7.19–7.49)
PLATELET # BLD AUTO: 240 THOUSANDS/UL (ref 149–390)
PMV BLD AUTO: 10.4 FL (ref 8.9–12.7)
PO2 BLDCOA: 20.4 MM HG (ref 5–25)
PO2 BLDCOV: 33.9 MM HG (ref 15–45)
POTASSIUM SERPL-SCNC: 3.5 MMOL/L (ref 3.5–5.3)
PROT SERPL-MCNC: 6.8 G/DL (ref 6.4–8.2)
RBC # BLD AUTO: 3.76 MILLION/UL (ref 3.81–5.12)
RH BLD: POSITIVE
SODIUM SERPL-SCNC: 138 MMOL/L (ref 136–145)
SPECIMEN EXPIRATION DATE: NORMAL
WBC # BLD AUTO: 5.79 THOUSAND/UL (ref 4.31–10.16)

## 2022-06-03 PROCEDURE — NC001 PR NO CHARGE: Performed by: OBSTETRICS & GYNECOLOGY

## 2022-06-03 PROCEDURE — 86850 RBC ANTIBODY SCREEN: CPT | Performed by: OBSTETRICS & GYNECOLOGY

## 2022-06-03 PROCEDURE — 4A1HXCZ MONITORING OF PRODUCTS OF CONCEPTION, CARDIAC RATE, EXTERNAL APPROACH: ICD-10-PCS | Performed by: OBSTETRICS & GYNECOLOGY

## 2022-06-03 PROCEDURE — 86592 SYPHILIS TEST NON-TREP QUAL: CPT | Performed by: OBSTETRICS & GYNECOLOGY

## 2022-06-03 PROCEDURE — 99211 OFF/OP EST MAY X REQ PHY/QHP: CPT

## 2022-06-03 PROCEDURE — 86900 BLOOD TYPING SEROLOGIC ABO: CPT | Performed by: OBSTETRICS & GYNECOLOGY

## 2022-06-03 PROCEDURE — 86901 BLOOD TYPING SEROLOGIC RH(D): CPT | Performed by: OBSTETRICS & GYNECOLOGY

## 2022-06-03 PROCEDURE — 88307 TISSUE EXAM BY PATHOLOGIST: CPT | Performed by: PATHOLOGY

## 2022-06-03 PROCEDURE — 82805 BLOOD GASES W/O2 SATURATION: CPT | Performed by: OBSTETRICS & GYNECOLOGY

## 2022-06-03 PROCEDURE — 82948 REAGENT STRIP/BLOOD GLUCOSE: CPT

## 2022-06-03 PROCEDURE — 85025 COMPLETE CBC W/AUTO DIFF WBC: CPT | Performed by: OBSTETRICS & GYNECOLOGY

## 2022-06-03 PROCEDURE — 80053 COMPREHEN METABOLIC PANEL: CPT | Performed by: OBSTETRICS & GYNECOLOGY

## 2022-06-03 RX ORDER — DIAPER,BRIEF,INFANT-TODD,DISP
1 EACH MISCELLANEOUS DAILY PRN
Status: DISCONTINUED | OUTPATIENT
Start: 2022-06-03 | End: 2022-06-05 | Stop reason: HOSPADM

## 2022-06-03 RX ORDER — IBUPROFEN 600 MG/1
600 TABLET ORAL EVERY 6 HOURS
Status: DISCONTINUED | OUTPATIENT
Start: 2022-06-03 | End: 2022-06-05 | Stop reason: HOSPADM

## 2022-06-03 RX ORDER — ONDANSETRON 2 MG/ML
4 INJECTION INTRAMUSCULAR; INTRAVENOUS EVERY 6 HOURS PRN
Status: DISCONTINUED | OUTPATIENT
Start: 2022-06-03 | End: 2022-06-03 | Stop reason: SDUPTHER

## 2022-06-03 RX ORDER — ACETAMINOPHEN 325 MG/1
650 TABLET ORAL EVERY 4 HOURS PRN
Status: DISCONTINUED | OUTPATIENT
Start: 2022-06-03 | End: 2022-06-05 | Stop reason: HOSPADM

## 2022-06-03 RX ORDER — DOCUSATE SODIUM 100 MG/1
100 CAPSULE, LIQUID FILLED ORAL 2 TIMES DAILY
Status: DISCONTINUED | OUTPATIENT
Start: 2022-06-03 | End: 2022-06-05 | Stop reason: HOSPADM

## 2022-06-03 RX ORDER — KETOROLAC TROMETHAMINE 30 MG/ML
30 INJECTION, SOLUTION INTRAMUSCULAR; INTRAVENOUS EVERY 6 HOURS PRN
Status: DISCONTINUED | OUTPATIENT
Start: 2022-06-03 | End: 2022-06-05 | Stop reason: HOSPADM

## 2022-06-03 RX ORDER — BUTORPHANOL TARTRATE 1 MG/ML
1 INJECTION, SOLUTION INTRAMUSCULAR; INTRAVENOUS
Status: DISCONTINUED | OUTPATIENT
Start: 2022-06-03 | End: 2022-06-05 | Stop reason: HOSPADM

## 2022-06-03 RX ORDER — SODIUM CHLORIDE, SODIUM LACTATE, POTASSIUM CHLORIDE, CALCIUM CHLORIDE 600; 310; 30; 20 MG/100ML; MG/100ML; MG/100ML; MG/100ML
125 INJECTION, SOLUTION INTRAVENOUS CONTINUOUS
Status: DISCONTINUED | OUTPATIENT
Start: 2022-06-03 | End: 2022-06-03

## 2022-06-03 RX ORDER — ONDANSETRON 2 MG/ML
4 INJECTION INTRAMUSCULAR; INTRAVENOUS EVERY 8 HOURS PRN
Status: DISCONTINUED | OUTPATIENT
Start: 2022-06-03 | End: 2022-06-05 | Stop reason: HOSPADM

## 2022-06-03 RX ORDER — OXYTOCIN/RINGER'S LACTATE 30/500 ML
1-30 PLASTIC BAG, INJECTION (ML) INTRAVENOUS
Status: DISCONTINUED | OUTPATIENT
Start: 2022-06-03 | End: 2022-06-05 | Stop reason: HOSPADM

## 2022-06-03 RX ORDER — CALCIUM CARBONATE 200(500)MG
1000 TABLET,CHEWABLE ORAL DAILY PRN
Status: DISCONTINUED | OUTPATIENT
Start: 2022-06-03 | End: 2022-06-05 | Stop reason: HOSPADM

## 2022-06-03 RX ORDER — LIDOCAINE HYDROCHLORIDE AND EPINEPHRINE 15; 5 MG/ML; UG/ML
INJECTION, SOLUTION EPIDURAL AS NEEDED
Status: DISCONTINUED | OUTPATIENT
Start: 2022-06-03 | End: 2022-06-03 | Stop reason: HOSPADM

## 2022-06-03 RX ORDER — SODIUM CHLORIDE 9 MG/ML
125 INJECTION, SOLUTION INTRAVENOUS CONTINUOUS
Status: DISCONTINUED | OUTPATIENT
Start: 2022-06-03 | End: 2022-06-05 | Stop reason: HOSPADM

## 2022-06-03 RX ADMIN — SODIUM CHLORIDE 125 ML/HR: 0.9 INJECTION, SOLUTION INTRAVENOUS at 16:11

## 2022-06-03 RX ADMIN — Medication 2 MILLI-UNITS/MIN: at 12:06

## 2022-06-03 RX ADMIN — SODIUM CHLORIDE 2.5 MILLION UNITS: 9 INJECTION, SOLUTION INTRAVENOUS at 15:33

## 2022-06-03 RX ADMIN — BUTORPHANOL TARTRATE 1 MG: 1 INJECTION, SOLUTION INTRAMUSCULAR; INTRAVENOUS at 14:38

## 2022-06-03 RX ADMIN — Medication: at 19:01

## 2022-06-03 RX ADMIN — SODIUM CHLORIDE 5 MILLION UNITS: 0.9 INJECTION, SOLUTION INTRAVENOUS at 11:38

## 2022-06-03 RX ADMIN — SODIUM CHLORIDE 125 ML/HR: 0.9 INJECTION, SOLUTION INTRAVENOUS at 11:38

## 2022-06-03 RX ADMIN — LIDOCAINE HYDROCHLORIDE AND EPINEPHRINE 5 ML: 15; 5 INJECTION, SOLUTION EPIDURAL at 16:12

## 2022-06-03 NOTE — PLAN OF CARE
Problem: PAIN - ADULT  Goal: Verbalizes/displays adequate comfort level or baseline comfort level  Description: Interventions:  - Encourage patient to monitor pain and request assistance  - Assess pain using appropriate pain scale  - Administer analgesics based on type and severity of pain and evaluate response  - Implement non-pharmacological measures as appropriate and evaluate response  - Consider cultural and social influences on pain and pain management  - Notify physician/advanced practitioner if interventions unsuccessful or patient reports new pain  Outcome: Progressing     Problem: INFECTION - ADULT  Goal: Absence or prevention of progression during hospitalization  Description: INTERVENTIONS:  - Assess and monitor for signs and symptoms of infection  - Monitor lab/diagnostic results  - Monitor all insertion sites, i e  indwelling lines, tubes, and drains  - Monitor endotracheal if appropriate and nasal secretions for changes in amount and color  - [place] appropriate cooling/warming therapies per order  - Administer medications as ordered  - Instruct and encourage patient and family to use good hand hygiene technique  - Identify and instruct in appropriate isolation precautions for identified infection/condition  Outcome: Progressing  Goal: Absence of fever/infection during neutropenic period  Description: INTERVENTIONS:  - Monitor WBC    Outcome: Progressing     Problem: SAFETY ADULT  Goal: Patient will remain free of falls  Description: INTERVENTIONS:  - Educate patient/family on patient safety including physical limitations  - Instruct patient to call for assistance with activity   - Consult OT/PT to assist with strengthening/mobility   - Keep Call bell within reach  - Keep bed low and locked with side rails adjusted as appropriate  - Keep care items and personal belongings within reach  - Initiate and maintain comfort rounds  - Make Fall Risk Sign visible to staff  - Offer Toileting every  Hours, in advance of need  - Initiate/Maintain alarm  - Obtain necessary fall risk management equipment:   - Apply yellow socks and bracelet for high fall risk patients  - Consider moving patient to room near nurses station  Outcome: Progressing  Goal: Maintain or return to baseline ADL function  Description: INTERVENTIONS:  -  Assess patient's ability to carry out ADLs; assess patient's baseline for ADL function and identify physical deficits which impact ability to perform ADLs (bathing, care of mouth/teeth, toileting, grooming, dressing, etc )  - Assess/evaluate cause of self-care deficits   - Assess range of motion  - Assess patient's mobility; develop plan if impaired  - Assess patient's need for assistive devices and provide as appropriate  - Encourage maximum independence but intervene and supervise when necessary  - Involve family in performance of ADLs  - Assess for home care needs following discharge   - Consider OT consult to assist with ADL evaluation and planning for discharge  - Provide patient education as appropriate  Outcome: Progressing  Goal: Maintains/Returns to pre admission functional level  Description: INTERVENTIONS:  - Perform BMAT or MOVE assessment daily    - Set and communicate daily mobility goal to care team and patient/family/caregiver  - Collaborate with rehabilitation services on mobility goals if consulted  - Perform Range of Motion  times a day  - Reposition patient every  hours    - Dangle patient  times a day  - Stand patient  times a day  - Ambulate patient  times a day  - Out of bed to chair  times a day   - Out of bed for meals  times a day  - Out of bed for toileting  - Record patient progress and toleration of activity level   Outcome: Progressing     Problem: Knowledge Deficit  Goal: Patient/family/caregiver demonstrates understanding of disease process, treatment plan, medications, and discharge instructions  Description: Complete learning assessment and assess knowledge base   Interventions:  - Provide teaching at level of understanding  - Provide teaching via preferred learning methods  Outcome: Progressing  Goal: Verbalizes understanding of labor plan  Description: Assess patient/family/caregiver's baseline knowledge level and ability to understand information  Provide education via patient/family/caregiver's preferred learning method at appropriate level of understanding  1  Provide teaching at level of understanding  2  Provide teaching via preferred learning method(s)    Outcome: Progressing     Problem: DISCHARGE PLANNING  Goal: Discharge to home or other facility with appropriate resources  Description: INTERVENTIONS:  - Identify barriers to discharge w/patient and caregiver  - Arrange for needed discharge resources and transportation as appropriate  - Identify discharge learning needs (meds, wound care, etc )  - Arrange for interpretive services to assist at discharge as needed  - Refer to Case Management Department for coordinating discharge planning if the patient needs post-hospital services based on physician/advanced practitioner order or complex needs related to functional status, cognitive ability, or social support system  Outcome: Progressing     Problem: BIRTH - VAGINAL/ SECTION  Goal: Fetal and maternal status remain reassuring during the birth process  Description: INTERVENTIONS:  - Monitor vital signs  - Monitor fetal heart rate  - Monitor uterine activity  - Monitor labor progression (vaginal delivery)  - DVT prophylaxis  - Antibiotic prophylaxis  Outcome: Progressing  Goal: Emotionally satisfying birthing experience for mother/fetus  Description: Interventions:  - Assess, plan, implement and evaluate the nursing care given to the patient in labor  - Advocate the philosophy that each childbirth experience is a unique experience and support the family's chosen level of involvement and control during the labor process   - Actively participate in both the patient's and family's teaching of the birth process  - Consider cultural, Mandaeism and age-specific factors and plan care for the patient in labor  Outcome: Progressing     Problem: POSTPARTUM  Goal: Experiences normal postpartum course  Description: INTERVENTIONS:  - Monitor maternal vital signs  - Assess uterine involution and lochia  Outcome: Progressing  Goal: Appropriate maternal -  bonding  Description: INTERVENTIONS:  - Identify family support  - Assess for appropriate maternal/infant bonding   -Encourage maternal/infant bonding opportunities  - Referral to  or  as needed  Outcome: Progressing  Goal: Establishment of infant feeding pattern  Description: INTERVENTIONS:  - Assess breast/bottle feeding  - Refer to lactation as needed  Outcome: Progressing  Goal: Incision(s), wounds(s) or drain site(s) healing without S/S of infection  Description: INTERVENTIONS  - Assess and document dressing, incision, wound bed, drain sites and surrounding tissue  - Provide patient and family education  - Perform skin care/dressing changes every   Outcome: Progressing     Problem: Labor & Delivery  Goal: Manages discomfort  Description: Assess and monitor for signs and symptoms of discomfort  Assess patient's pain level regularly and per hospital policy  Administer medications as ordered  Support use of nonpharmacological methods to help control pain such as distraction, imagery, relaxation, and application of heat and cold  Collaborate with interdisciplinary team and patient to determine appropriate pain management plan  1  Include patient in decisions related to comfort  2  Offer non-pharmacological pain management interventions  3  Report ineffective pain management to physician  Outcome: Progressing  Goal: Patient vital signs are stable  Description: 1  Assess vital signs - vaginal delivery    Outcome: Progressing

## 2022-06-03 NOTE — OB LABOR/OXYTOCIN SAFETY PROGRESS
Oxytocin Safety Progress Check Note - Teri Meneses 31 y o  female MRN: [id number]    Unit/Bed#: -01 Encounter: [id number]    Dose (alecia-units/min) Oxytocin: 6 alecia-units/min  Contraction Frequency (minutes): 4-5  Contraction Quality: Mild  Tachysystole: No   Cervical Dilation: 2        Cervical Effacement: 50  Fetal Station: -2  Baseline Rate: 140 bpm  Fetal Heart Rate: 155 BPM  FHR Category: Category I           Vital Signs:   Vitals:    06/03/22 1253   BP: 115/67   Pulse: 88   Resp:    Temp:        Notes/comments: Patient with some discomfort with contractions  Declines any analgesics  Glucose in 90's    Will continue augmenting with Pitocin        Thuy Villafuerte DO 6/3/2022 1:19 PM

## 2022-06-03 NOTE — OB LABOR/OXYTOCIN SAFETY PROGRESS
Oxytocin Safety Progress Check Note - Kevin Desir 31 y o  female MRN: [id number]    Unit/Bed#: -01 Encounter: [id number]    Dose (alecia-units/min) Oxytocin: 12 alecia-units/min  Contraction Frequency (minutes): 3  Contraction Quality: Moderate  Tachysystole: No   Cervical Dilation: 6        Cervical Effacement: 80  Fetal Station: -1  Baseline Rate: 130 bpm  Fetal Heart Rate: 140 BPM  FHR Category: Category I           Vital Signs:   Vitals:    06/03/22 1658   BP: 110/55   Pulse: 98   Resp:    Temp:    SpO2:        Notes/comments: Patient requested exam due to feeling pelvic pressure  Good progress    Patient comfortable with epidural        Dede Marrero DO 6/3/2022 5:13 PM

## 2022-06-03 NOTE — OB LABOR/OXYTOCIN SAFETY PROGRESS
Oxytocin Safety Progress Check Note - Rona Goode 31 y o  female MRN: [id number]    Unit/Bed#: -01 Encounter: [id number]    Dose (alecia-units/min) Oxytocin: 10 alecia-units/min  Contraction Frequency (minutes): 2-4  Contraction Quality: Moderate  Tachysystole: No   Cervical Dilation: 4        Cervical Effacement: 70  Fetal Station: -2  Baseline Rate: 130 bpm  Fetal Heart Rate: 140 BPM  FHR Category: Category I         Vital Signs:   Vitals:    06/03/22 1451   BP: 117/72   Pulse: 85   Resp:    Temp:        Notes/comments:  Patient uncomfortable with CTXs, desires Epidural           Jaleel Jain DO 6/3/2022 3:28 PM

## 2022-06-03 NOTE — ANESTHESIA PROCEDURE NOTES
Epidural Block    Patient location during procedure: OB  Start time: 6/3/2022 4:10 PM  Reason for block: procedure for pain, at surgeon's request and primary anesthetic  Staffing  Performed: Anesthesiologist   Anesthesiologist: Naman Price MD  Preanesthetic Checklist  Completed: patient identified, IV checked, site marked, risks and benefits discussed, surgical consent, monitors and equipment checked, pre-op evaluation and timeout performed  Epidural  Patient position: sitting  Prep: Betadine  Patient monitoring: heart rate, cardiac monitor, continuous pulse ox and frequent blood pressure checks  Approach: midline  Location: lumbar  Injection technique: DESTIN air  Needle  Needle type: Tuohy   Needle gauge: 18 G  Catheter at skin depth: 11 cm  Block catheter secured with: statlock, tegaderm, two inch tape  Test dose: negativeMedication group details: lidocaine 1 5% with epi, 5 cc    Assessment  Sensory level: T10  Number of attempts: 1negative aspiration for CSF, negative aspiration for heme and no paresthesia on injection  patient tolerated the procedure well with no immediate complications  Additional Notes  One attempt, L3-4, DESTIN at 6 cm, taped to skin at 11 cm

## 2022-06-03 NOTE — L&D DELIVERY NOTE
Patient was found to be completely dilated and +1 station  She pushed for 52 minutes to spontaneously deliver a liveborn female infant in LAUREN position through clear fluid at 1929, weight 8 lbs 10 oz, APGARS 4 and 8 at 1 and 5 minutes, respectively  Delivery was complicated by a 60 second shoulder dystocia, with the infant's left shoulder anterior  Dystocia resolved with standard traction on the fetal vertex and a combination of Shola maneuver, suprapubic pressure, delivery of the posterior arm  No episiotomy was performed  No fundal pressure was applied  The cord was doubly clamped and cut without delay, and the infant was handed off to the awaiting pediatrics team for evaluation  Please see baby's chart for  exam  Cord blood and cord gases were collected  Pitocin was started for active management of the 3rd stage  Placenta delivered spontaneously thereafter  Bimanual exam was performed, and the fundus was noted to be firm  No laceration was noted  Excellent hemostasis was noted, with total EBL of 274 mL  Following delivery, the events of delivery were discussed with the patient  Shoulder dystocia was disclosed, and patient was counseled on potential sequelae, risk of recurrence, and risk of postpartum depression         Cirilo No DO  6/3/2022 7:43 PM

## 2022-06-03 NOTE — TELEPHONE ENCOUNTER
Pt is currently 37w6d GA,  and reports her "water broke  Pt reports she was lying in bed and felt a gush of fluid, fluids was clear with no odor  Pt denies any contractions, spotting or bleeding  Pt reports good fetal activity     Spoke with Adeline Booth on [facility] L&D and informed of pending arrival    Left a message for on call provider to inform of pending arrival

## 2022-06-03 NOTE — H&P
History & Physical - OB/GYN   Teri Meneses 31 y o  female MRN: [id number]  Unit/Bed#:  205-01 Encounter: [id number]    Assessment:  31 y o  [id number] at 37w6d admitted for PROM  Patient with Kev[facility] [address]:  1  Admit to L&D  2  Place IV, initiate IV fluids  3  Labs: CBC, RPR, type and screen  4  Start PCN for GBS prophylaxis  4  Labor management: start pitocin  5  Analgesia/Epidural PRN  6  Insulin infusion pregnancy protocol ordered  _____________________    Chief complaint:  My water broke    She is a patient of [address]  HPI: Teri Meneses is 31 y o  [id number] at 37w6d presenting for PROM that occurred around 9 AM today    Contractions:  Yes, mild in discomfort   Fetal movement:  yes  Vaginal bleeding:   no  Leaking of fluid:  Yes, clear fluid      Pregnancy Complications:  T6P5  KELECHI:2022 Problems (from 11/10/21 to present)     Problem Noted Resolved    Hyperglycemia in pregnancy 2022 by RENEE Cordova No    BMI 31 0-31 9,adult 2022 by RENEE Cordova No    36 weeks gestation of pregnancy 2022 by Velia Feliz MD No    Insulin controlled gestational diabetes mellitus (GDM) in third trimester 3/31/2022 by Thuy Villafuerte DO No    Overview Addendum 5/3/2022  4:06 PM by Madai Hoskins MD     1 Hr   Managed on insulin  Getting NSTs  Growth u/s next 2022           Previous Version    History of  2022 by Madai Hoskins MD No    Overview Signed 2022 11:55 AM by Madai Hoskins MD     Planning TOLAC           Anemia during pregnancy in third trimester 2021 by Vivi Garay MD No    Overview Addendum 2022 10:55 PM by Madai Hoskins MD     Hgb 9 7 on initial prenatal panel  Rec Slow Fe daily  Hgb electrophoresis normal     Hgb 9 2 @ 28 week testing  She will be more compliant with PO iron  Rec recheck @ 34-36 weeks, which would give enough time for iron infusion, if necessary      2022 hgb 10 2 - good response, will continue  Previous Version    Maternal care due to low transverse uterine scar from previous  delivery 11/10/2021 by Kiana Mishra MD No    Overview Signed 11/10/2021  2:20 PM by Kiana Mishra MD     Primary  for non-reassuring FHT  Subsequently had successful   Plans for TOLAC this pregnancy  Herpes simplex  by Kiana Mishra MD No    Overview Addendum 5/3/2022  4:24 PM by Michael Barlow MD     Unconfirmed diagnosis  Patient has had multiple herpetic-appearing vulvar lesions following waxing, but PCR testing has been negative  Was told she had HSV  Discussed that safest to treat as positive with initiation of Valtrex at 36 weeks GA for suppressive therapy  Reviewed 5/3/2022 recommendation for suppression, pt declines as she feels she does not have HSV  Last symptoms spring 2020 and PCR testing then negative             Previous Version    8 weeks gestation of pregnancy  by Kiana Mishra MD 11/10/2021 by Kiana Mishra MD          PMH:  Past Medical History:   Diagnosis Date    Chlamydia     Chlamydia contact, treated     Herpes simplex     Papanicolaou smear 2020    neg/neg    Trichomoniasis     Urogenital trichomoniasis        PSH:  Past Surgical History:   Procedure Laterality Date     SECTION         Social Hx:  Social History     Tobacco Use    Smoking status: Never Smoker    Smokeless tobacco: Never Used   Vaping Use    Vaping Use: Never used   Substance Use Topics    Alcohol use: Not Currently    Drug use: Not Currently     Types: Marijuana     Comment: Admits to social use, approx once week, Stopped using with pregnancy         OB Hx:  OB History    Para Term  AB Living   6 2 2 0 3 2   SAB IAB Ectopic Multiple Live Births   1 0 1 0 0      # Outcome Date GA Lbr Juan/2nd Weight Sex Delivery Anes PTL Lv   6 Current            5 SAB 2021           4 Term 13   3317 g (7 lb 5 oz) M Vag-Spont  N    3 AB 2006           2 Term 05   3827 g (8 lb 7 oz) M CS-LTranv EPI N       Birth Comments:  -fetal heaart deceleration   1 Ectopic               Obstetric Comments   Menarche: 15       Meds:  No current facility-administered medications on file prior to encounter  Current Outpatient Medications on File Prior to Encounter   Medication Sig Dispense Refill    BD Pen Needle Kimberly 2nd Gen 32G X 4 MM MISC use 1 PEN NEEDLE to inject MEDICATION subcutaneously twice a day or as directed      Blood Glucose Monitoring Suppl (OneTouch Verio Reflect) w/Device KIT Use as directed      ferrous sulfate 324 (65 Fe) mg Take 1 tablet (324 mg total) by mouth 2 (two) times a day before meals 60 tablet 1    insulin glargine (Lantus SoloStar) 100 units/mL injection pen Inject 48 Units under the skin daily at bedtime At 9:30 PM daily  To be titrated  GDM  15 mL 0    Insulin Pen Needle 31G X 5 MM MISC Inject under the skin daily at bedtime Use 2 a day or as directed  100 each 0    ketoconazole (NIZORAL) 2 % shampoo APPLY TO THE AFFECTED AREA(S) TOPICALLY TWICE WEEKLY      OneTouch Delica Lancets 91I MISC 4 times a day   each 3    OneTouch Verio test strip Use as instructed, test blood sugar 4 times a day,  strip 3    Prenatal Vit-Fe Sulfate-FA-DHA (Ultra Prenatal + DHA) 27-0 8-200 MG CAPS Take 1 tablet by mouth in the morning 30 capsule 12       Allergies:  No Known Allergies    Labs:  ABO Grouping   Date Value Ref Range Status   12/15/2021 O  Final      Rh Type   Date Value Ref Range Status   12/15/2021 Positive  Final     Comment:     Please note: Prior records for this patient's ABO / Rh type are not  available for additional verification  Rh Type   Date Value Ref Range Status   12/15/2021 Positive  Final     Comment:     Please note: Prior records for this patient's ABO / Rh type are not  available for additional verification         HIV-1/HIV-2 AB   Date Value Ref Range Status   12/15/2021 Non-Reactive  Final     Hepatitis B Surface Ag   Date Value Ref Range Status   12/15/2021 negative  Final     HEP C AB   Date Value Ref Range Status   12/15/2021 <0 1 0 0 - 0 9 s/co ratio Final     Comment:                                       Negative:     < 0 8                               Indeterminate: 0 8 - 0 9                                    Positive:     > 0 9   The CDC recommends that a positive HCV antibody result   be followed up with a HCV Nucleic Acid Amplification   test (652389)  RPR   Date Value Ref Range Status   2022 Non Reactive Non Reactive Final     No results found for: RUBELLAIGGQT  Glucose   Date Value Ref Range Status   2022 235 (H) 65 - 139 mg/dL Final     Comment:     According to ADA, a glucose threshold of >139 mg/dL after 50-gram  load identifies approximately 80% of women with gestational  diabetes mellitus, while the sensitivity is further increased to  approximately 90% by a threshold of >129 mg/dL  No results found for: GPYVQFH2GR  Strep Grp B CLAYTON   Date Value Ref Range Status   2022 Positive (A) Negative Final     Comment:     Centers for Disease Control and Prevention (CDC) and American Congress  of Obstetricians and Gynecologists (ACOG) guidelines for prevention of   group B streptococcal (GBS) disease specify co-collection of  a vaginal and rectal swab specimen to maximize sensitivity of GBS  detection  Per the CDC and ACOG, swabbing both the lower vagina and  rectum substantially increases the yield of detection compared with  sampling the vagina alone  Penicillin G, ampicillin, or cefazolin are indicated for intrapartum  prophylaxis of  GBS colonization  Reflex susceptibility  testing should be performed prior to use of clindamycin only on GBS  isolates from penicillin-allergic women who are considered a high risk  for anaphylaxis   Treatment with vancomycin without additional testing  is warranted if resistance to clindamycin is noted           Physical Exam:  /80 (BP Location: Right arm)   Pulse 100   Temp 97 6 °F (36 4 °C) (Temporal)   Resp 17   LMP 09/11/2021 (Exact Date)     Gen: alert, no acute distress  Cardiac: regular rate  Resp: unlabored breathing  Abdomen: gravid, non-tender, non-distended  Extremities: non-tender, no edema    Estimated Fetal Weight: 7 lbs 8 oz  Presentation: cephalic, confirmed via u/s    SVE:    2/50/-2    Pelvis assessed and felt to be adequate    FHT:  140's, +accels, no decels, moderate variability     TOCO: irregular CTXs, mild       Membranes: grossly ruptured, clear fluid      Yazan Cornell DO  6/3/2022 11:17 AM

## 2022-06-03 NOTE — ANESTHESIA PREPROCEDURE EVALUATION
Procedure:  LABOR ANALGESIA    Relevant Problems   GYN   (+) 36 weeks gestation of pregnancy      HEMATOLOGY   (+) Anemia during pregnancy in third trimester      Endocrine   (+) Insulin controlled gestational diabetes mellitus (GDM) in third trimester        Physical Exam    Airway    Mallampati score: III  TM Distance: >3 FB  Neck ROM: full     Dental   No notable dental hx     Cardiovascular      Pulmonary      Other Findings        Anesthesia Plan  ASA Score- 2     Anesthesia Type- epidural with ASA Monitors  Additional Monitors:   Airway Plan:           Plan Factors-    Chart reviewed  Existing labs reviewed  Patient summary reviewed  Patient is not a current smoker  Patient did not smoke on day of surgery  Induction- intravenous  Postoperative Plan-   Planned trial extubation    Informed Consent- Anesthetic plan and risks discussed with patient

## 2022-06-03 NOTE — OB LABOR/OXYTOCIN SAFETY PROGRESS
Oxytocin Safety Progress Check Note - Melisa Cespedes 31 y o  female MRN: [id number]    Unit/Bed#: -01 Encounter: [id number]    Dose (alecia-units/min) Oxytocin: 14 alecia-units/min  Contraction Frequency (minutes): 2-7  Contraction Quality: Moderate  Tachysystole: No   Cervical Dilation: 8        Cervical Effacement: 90  Fetal Station: -1  Baseline Rate: 135 bpm  Fetal Heart Rate: 140 BPM  FHR Category: Category I         Vital Signs:   Vitals:    06/03/22 1735   BP: 106/66   Pulse: 91   Resp:    Temp:    SpO2:        Notes/comments: Patient called due to feeling leakage of fluid and more pelvic pressure  Informed patient that with ROM the fluid has been leaking since prior to admission    Patient examined and informed of increase in cervical dilation        Amparo Langston DO 6/3/2022 5:52 PM

## 2022-06-04 LAB
ERYTHROCYTE [DISTWIDTH] IN BLOOD BY AUTOMATED COUNT: 14.4 % (ref 11.6–15.1)
HCT VFR BLD AUTO: 28.1 % (ref 34.8–46.1)
HGB BLD-MCNC: 9 G/DL (ref 11.5–15.4)
MCH RBC QN AUTO: 27 PG (ref 26.8–34.3)
MCHC RBC AUTO-ENTMCNC: 32 G/DL (ref 31.4–37.4)
MCV RBC AUTO: 84 FL (ref 82–98)
PLATELET # BLD AUTO: 201 THOUSANDS/UL (ref 149–390)
PMV BLD AUTO: 10.2 FL (ref 8.9–12.7)
RBC # BLD AUTO: 3.33 MILLION/UL (ref 3.81–5.12)
WBC # BLD AUTO: 12.4 THOUSAND/UL (ref 4.31–10.16)

## 2022-06-04 PROCEDURE — 99024 POSTOP FOLLOW-UP VISIT: CPT | Performed by: OBSTETRICS & GYNECOLOGY

## 2022-06-04 PROCEDURE — 85027 COMPLETE CBC AUTOMATED: CPT | Performed by: OBSTETRICS & GYNECOLOGY

## 2022-06-04 RX ADMIN — DOCUSATE SODIUM 100 MG: 100 CAPSULE, LIQUID FILLED ORAL at 08:36

## 2022-06-04 RX ADMIN — IBUPROFEN 600 MG: 600 TABLET ORAL at 23:04

## 2022-06-04 RX ADMIN — BENZOCAINE AND LEVOMENTHOL 1 APPLICATION: 200; 5 SPRAY TOPICAL at 21:25

## 2022-06-04 RX ADMIN — IBUPROFEN 600 MG: 600 TABLET ORAL at 05:49

## 2022-06-04 RX ADMIN — IBUPROFEN 600 MG: 600 TABLET ORAL at 15:43

## 2022-06-04 RX ADMIN — DOCUSATE SODIUM 100 MG: 100 CAPSULE, LIQUID FILLED ORAL at 18:04

## 2022-06-04 NOTE — ANESTHESIA POSTPROCEDURE EVALUATION
Post-Op Assessment Note    CV Status:  Stable  Pain Score: 0    Pain management: adequate     Mental Status:  Alert and awake   Hydration Status:  Euvolemic   PONV Controlled:  Controlled   Airway Patency:  Patent      Post Op Vitals Reviewed: Yes        Post-op block assessment: catheter intact and no complications      No complications documented      BP      Temp     Pulse     Resp      SpO2

## 2022-06-04 NOTE — PLAN OF CARE
Problem: PAIN - ADULT  Goal: Verbalizes/displays adequate comfort level or baseline comfort level  Description: Interventions:  - Encourage patient to monitor pain and request assistance  - Assess pain using appropriate pain scale  - Administer analgesics based on type and severity of pain and evaluate response  - Implement non-pharmacological measures as appropriate and evaluate response  - Consider cultural and social influences on pain and pain management  - Notify physician/advanced practitioner if interventions unsuccessful or patient reports new pain  Outcome: Progressing     Problem: INFECTION - ADULT  Goal: Absence or prevention of progression during hospitalization  Description: INTERVENTIONS:  - Assess and monitor for signs and symptoms of infection  - Monitor lab/diagnostic results  - Monitor all insertion sites, i e  indwelling lines, tubes, and drains  - Monitor endotracheal if appropriate and nasal secretions for changes in amount and color  - [place] appropriate cooling/warming therapies per order  - Administer medications as ordered  - Instruct and encourage patient and family to use good hand hygiene technique  - Identify and instruct in appropriate isolation precautions for identified infection/condition  Outcome: Progressing  Goal: Absence of fever/infection during neutropenic period  Description: INTERVENTIONS:  - Monitor WBC    Outcome: Progressing     Problem: SAFETY ADULT  Goal: Patient will remain free of falls  Description: INTERVENTIONS:  - Educate patient/family on patient safety including physical limitations  - Instruct patient to call for assistance with activity   - Consult OT/PT to assist with strengthening/mobility   - Keep Call bell within reach  - Keep bed low and locked with side rails adjusted as appropriate  - Keep care items and personal belongings within reach  - Initiate and maintain comfort rounds  - Make Fall Risk Sign visible to staff  - Obtain necessary fall risk management equipment  - Apply yellow socks and bracelet for high fall risk patients  - Consider moving patient to room near nurses station  Outcome: Progressing  Goal: Maintain or return to baseline ADL function  Description: INTERVENTIONS:  -  Assess patient's ability to carry out ADLs; assess patient's baseline for ADL function and identify physical deficits which impact ability to perform ADLs (bathing, care of mouth/teeth, toileting, grooming, dressing, etc )  - Assess/evaluate cause of self-care deficits   - Assess range of motion  - Assess patient's mobility; develop plan if impaired  - Assess patient's need for assistive devices and provide as appropriate  - Encourage maximum independence but intervene and supervise when necessary  - Involve family in performance of ADLs  - Assess for home care needs following discharge   - Consider OT consult to assist with ADL evaluation and planning for discharge  - Provide patient education as appropriate  Outcome: Progressing  Goal: Maintains/Returns to pre admission functional level  Description: INTERVENTIONS:  - Perform BMAT or MOVE assessment daily    - Set and communicate daily mobility goal to care team and patient/family/caregiver  - Collaborate with rehabilitation services on mobility goals if consulted  - Out of bed for toileting  - Record patient progress and toleration of activity level   Outcome: Progressing     Problem: Knowledge Deficit  Goal: Patient/family/caregiver demonstrates understanding of disease process, treatment plan, medications, and discharge instructions  Description: Complete learning assessment and assess knowledge base  Interventions:  - Provide teaching at level of understanding  - Provide teaching via preferred learning methods  Outcome: Progressing  Goal: Verbalizes understanding of labor plan  Description: Assess patient/family/caregiver's baseline knowledge level and ability to understand information    Provide education via patient/family/caregiver's preferred learning method at appropriate level of understanding  1  Provide teaching at level of understanding  2  Provide teaching via preferred learning method(s)    Outcome: Completed     Problem: DISCHARGE PLANNING  Goal: Discharge to home or other facility with appropriate resources  Description: INTERVENTIONS:  - Identify barriers to discharge w/patient and caregiver  - Arrange for needed discharge resources and transportation as appropriate  - Identify discharge learning needs (meds, wound care, etc )  - Arrange for interpretive services to assist at discharge as needed  - Refer to Case Management Department for coordinating discharge planning if the patient needs post-hospital services based on physician/advanced practitioner order or complex needs related to functional status, cognitive ability, or social support system  Outcome: Progressing     Problem: BIRTH - VAGINAL/ SECTION  Goal: Fetal and maternal status remain reassuring during the birth process  Description: INTERVENTIONS:  - Monitor vital signs  - Monitor fetal heart rate  - Monitor uterine activity  - Monitor labor progression (vaginal delivery)  - DVT prophylaxis  - Antibiotic prophylaxis  Outcome: Completed  Goal: Emotionally satisfying birthing experience for mother/fetus  Description: Interventions:  - Assess, plan, implement and evaluate the nursing care given to the patient in labor  - Advocate the philosophy that each childbirth experience is a unique experience and support the family's chosen level of involvement and control during the labor process   - Actively participate in both the patient's and family's teaching of the birth process  - Consider cultural, Methodist and age-specific factors and plan care for the patient in labor  Outcome: Completed     Problem: POSTPARTUM  Goal: Experiences normal postpartum course  Description: INTERVENTIONS:  - Monitor maternal vital signs  - Assess uterine involution and lochia  Outcome: Progressing  Goal: Appropriate maternal -  bonding  Description: INTERVENTIONS:  - Identify family support  - Assess for appropriate maternal/infant bonding   -Encourage maternal/infant bonding opportunities  - Referral to  or  as needed  Outcome: Progressing  Goal: Establishment of infant feeding pattern  Description: INTERVENTIONS:  - Assess breast/bottle feeding  - Refer to lactation as needed  Outcome: Progressing  Goal: Incision(s), wounds(s) or drain site(s) healing without S/S of infection  Description: INTERVENTIONS  - Assess and document dressing, incision, wound bed, drain sites and surrounding tissue  - Provide patient and family education  - Perform skin care/dressing changes every  Outcome: Progressing     Problem: Labor & Delivery  Goal: Manages discomfort  Description: Assess and monitor for signs and symptoms of discomfort  Assess patient's pain level regularly and per hospital policy  Administer medications as ordered  Support use of nonpharmacological methods to help control pain such as distraction, imagery, relaxation, and application of heat and cold  Collaborate with interdisciplinary team and patient to determine appropriate pain management plan  1  Include patient in decisions related to comfort  2  Offer non-pharmacological pain management interventions  3  Report ineffective pain management to physician  Outcome: Completed  Goal: Patient vital signs are stable  Description: 1  Assess vital signs - vaginal delivery    Outcome: Completed

## 2022-06-04 NOTE — CASE MANAGEMENT
Case Management Progress Note    Patient name Tatyana Gonzalez  Location /-71 MRN [id number]  : 1985 Date 2022       LOS (days): 1  Geometric Mean LOS (GMLOS) (days):   Days to [address]:        OBJECTIVE:        Current admission status: Inpatient  Preferred Pharmacy:   [facility] [address] [facility]  [address] [facility]  [address] [address] [facility] [address] [address]  Phone: [phone] Fax: [phone]    Primary Care Provider: Sukhdeep Kwong MD    Primary Insurance: [place] [employer]  Secondary Insurance:     PROGRESS NOTE: Cm consulted regarding pt requesting breast pump and that there was an admission of early pregnancy THC use  There is not urine drug screen on pts record since Dec 2021 which was negative  CM met with pt regarding breast pump selection and to address and remind her the risks of THC use especially around an infant  Pt expressed understanding  Pt selected Medela breast pump  Order submitted to [facility]  Pump delivered and signed for from consignment

## 2022-06-04 NOTE — PROGRESS NOTES
Progress Note - OB/GYN  Post-Partum Physician Note   Ki Booker 31 y o  female MRN: [id number]  Unit/Bed#:  209-01 Encounter: [id number]    Patient is postpartum day 1 from a Vaginal, Spontaneous  with no laceration and Anesthesia: epidural    Subjective:   Pain: no  Tolerating Oral Intake: yes  Voiding: yes  Flatus: yes  Bowel Movement: no  Ambulating: yes  Shortness of Breath: no  Leg Pain/Discomfort: no  Lochia: normal      Objective:   Vitals:    06/03/22 2130 06/03/22 2257 06/04/22 0306 06/04/22 0747   BP: 125/85 130/81 122/70 114/79   BP Location: Right arm Right arm Right arm Right arm   Pulse: 93 84 85 83   Resp: 18 18 18 20   Temp: 98 °F (36 7 °C) 98 4 °F (36 9 °C) 98 3 °F (36 8 °C) 98 2 °F (36 8 °C)   TempSrc: Oral Oral Oral Oral   SpO2:  100% 99%    Weight:       Height:           Intake/Output Summary (Last 24 hours) at 6/4/2022 1452  Last data filed at 6/3/2022 2130  Gross per 24 hour   Intake 1000 ml   Output 1324 ml   Net -324 ml       Physical Exam:  General: in no apparent distress  Abdomen: abdomen is soft without significant tenderness  Fundus: Firm, 1 below the umbilicus  Perineum: exam deferred  Lower extremeties: nontender      Labs/Tests:   Lab Results   Component Value Date    WBC 12 40 (H) 06/04/2022    HGB 9 0 (L) 06/04/2022    HCT 28 1 (L) 06/04/2022    MCV 84 06/04/2022     06/04/2022       Brief OB Lab review:  ABO Grouping   Date Value Ref Range Status   06/03/2022 O  Final      Rh Factor   Date Value Ref Range Status   06/03/2022 Positive  Final     Rh Type   Date Value Ref Range Status   12/15/2021 Positive  Final     Comment:     Please note: Prior records for this patient's ABO / Rh type are not  available for additional verification        No results found for: ANTIBODYSCR  No results found for: RUBM    MEDS:   Current Facility-Administered Medications   Medication Dose Route Frequency    acetaminophen (TYLENOL) tablet 650 mg  650 mg Oral Q4H PRN    benzocaine-menthol-lanolin-aloe (DERMOPLAST) 20-0 5 % topical spray 1 application  1 application Topical H4P PRN    butorphanol (STADOL) injection 1 mg  1 mg Intravenous Q3H PRN    calcium carbonate (TUMS) chewable tablet 1,000 mg  1,000 mg Oral Daily PRN    docusate sodium (COLACE) capsule 100 mg  100 mg Oral BID    hydrocortisone 1 % cream 1 application  1 application Topical Daily PRN    ibuprofen (MOTRIN) tablet 600 mg  600 mg Oral Q6H    ketorolac (TORADOL) injection 30 mg  30 mg Intravenous Q6H PRN    ondansetron (ZOFRAN) injection 4 mg  4 mg Intravenous Q8H PRN    oxytocin (PITOCIN) 30 Units in lactated ringers 500 mL infusion  1-30 alecia-units/min Intravenous Titrated    ropivacaine 0 2% PCEA   Epidural Continuous    sodium chloride 0 9 % infusion  125 mL/hr Intravenous Continuous    witch hazel-glycerin (TUCKS) topical pad 1 pad  1 pad Topical Q4H PRN     Invasive Devices  Timeline    None                 Assessment and Plan:  31y o  year-old [id number], postpartum day 1 status-post  Vaginal, Spontaneous  and no laceration  Continue routine postpartum care  Encourage ambulation  Planning discharge tomorrow    Postpartum anemia  Postpartum hgb 9 0g/dl  Asx  Pt declines IV iron offer  Will restart PO iron at discharge after BM        Thad Barth MD

## 2022-06-04 NOTE — PLAN OF CARE
Problem: PAIN - ADULT  Goal: Verbalizes/displays adequate comfort level or baseline comfort level  Description: Interventions:  - Encourage patient to monitor pain and request assistance  - Assess pain using appropriate pain scale  - Administer analgesics based on type and severity of pain and evaluate response  - Implement non-pharmacological measures as appropriate and evaluate response  - Consider cultural and social influences on pain and pain management  - Notify physician/advanced practitioner if interventions unsuccessful or patient reports new pain  Outcome: Progressing     Problem: INFECTION - ADULT  Goal: Absence or prevention of progression during hospitalization  Description: INTERVENTIONS:  - Assess and monitor for signs and symptoms of infection  - Monitor lab/diagnostic results  - Monitor all insertion sites, i e  indwelling lines, tubes, and drains  - Monitor endotracheal if appropriate and nasal secretions for changes in amount and color  - [place] appropriate cooling/warming therapies per order  - Administer medications as ordered  - Instruct and encourage patient and family to use good hand hygiene technique  - Identify and instruct in appropriate isolation precautions for identified infection/condition  Outcome: Progressing  Goal: Absence of fever/infection during neutropenic period  Description: INTERVENTIONS:  - Monitor WBC    Outcome: Progressing     Problem: SAFETY ADULT  Goal: Patient will remain free of falls  Description: INTERVENTIONS:  - Educate patient/family on patient safety including physical limitations  - Instruct patient to call for assistance with activity   - Consult OT/PT to assist with strengthening/mobility   - Keep Call bell within reach  - Keep bed low and locked with side rails adjusted as appropriate  - Keep care items and personal belongings within reach  - Initiate and maintain comfort rounds  - Make Fall Risk Sign visible to staff  - Apply yellow socks and bracelet for high fall risk patients  - Consider moving patient to room near nurses station  Outcome: Progressing  Goal: Maintain or return to baseline ADL function  Description: INTERVENTIONS:  -  Assess patient's ability to carry out ADLs; assess patient's baseline for ADL function and identify physical deficits which impact ability to perform ADLs (bathing, care of mouth/teeth, toileting, grooming, dressing, etc )  - Assess/evaluate cause of self-care deficits   - Assess range of motion  - Assess patient's mobility; develop plan if impaired  - Assess patient's need for assistive devices and provide as appropriate  - Encourage maximum independence but intervene and supervise when necessary  - Involve family in performance of ADLs  - Assess for home care needs following discharge   - Consider OT consult to assist with ADL evaluation and planning for discharge  - Provide patient education as appropriate  Outcome: Progressing  Goal: Maintains/Returns to pre admission functional level  Description: INTERVENTIONS:  - Perform BMAT or MOVE assessment daily    - Set and communicate daily mobility goal to care team and patient/family/caregiver     - Collaborate with rehabilitation services on mobility goals if consulted  - Out of bed for toileting  - Record patient progress and toleration of activity level   Outcome: Progressing     Problem: DISCHARGE PLANNING  Goal: Discharge to home or other facility with appropriate resources  Description: INTERVENTIONS:  - Identify barriers to discharge w/patient and caregiver  - Arrange for needed discharge resources and transportation as appropriate  - Identify discharge learning needs (meds, wound care, etc )  - Arrange for interpretive services to assist at discharge as needed  - Refer to Case Management Department for coordinating discharge planning if the patient needs post-hospital services based on physician/advanced practitioner order or complex needs related to functional status, cognitive ability, or social support system  Outcome: Progressing     Problem: POSTPARTUM  Goal: Experiences normal postpartum course  Description: INTERVENTIONS:  - Monitor maternal vital signs  - Assess uterine involution and lochia  Outcome: Progressing  Goal: Appropriate maternal -  bonding  Description: INTERVENTIONS:  - Identify family support  - Assess for appropriate maternal/infant bonding   -Encourage maternal/infant bonding opportunities  - Referral to  or  as needed  Outcome: Progressing  Goal: Establishment of infant feeding pattern  Description: INTERVENTIONS:  - Assess breast/bottle feeding  - Refer to lactation as needed  Outcome: Progressing  Goal: Incision(s), wounds(s) or drain site(s) healing without S/S of infection  Description: INTERVENTIONS  - Assess and document dressing, incision, wound bed, drain sites and surrounding tissue  - Provide patient and family education  Outcome: Progressing

## 2022-06-04 NOTE — PLAN OF CARE
Problem: PAIN - ADULT  Goal: Verbalizes/displays adequate comfort level or baseline comfort level  Description: Interventions:  - Encourage patient to monitor pain and request assistance  - Assess pain using appropriate pain scale  - Administer analgesics based on type and severity of pain and evaluate response  - Implement non-pharmacological measures as appropriate and evaluate response  - Consider cultural and social influences on pain and pain management  - Notify physician/advanced practitioner if interventions unsuccessful or patient reports new pain  Outcome: Progressing     Problem: INFECTION - ADULT  Goal: Absence or prevention of progression during hospitalization  Description: INTERVENTIONS:  - Assess and monitor for signs and symptoms of infection  - Monitor lab/diagnostic results  - Monitor all insertion sites, i e  indwelling lines, tubes, and drains  - Monitor endotracheal if appropriate and nasal secretions for changes in amount and color  - [place] appropriate cooling/warming therapies per order  - Administer medications as ordered  - Instruct and encourage patient and family to use good hand hygiene technique  - Identify and instruct in appropriate isolation precautions for identified infection/condition  Outcome: Progressing  Goal: Absence of fever/infection during neutropenic period  Description: INTERVENTIONS:  - Monitor WBC    Outcome: Progressing     Problem: SAFETY ADULT  Goal: Patient will remain free of falls  Description: INTERVENTIONS:  - Educate patient/family on patient safety including physical limitations  - Instruct patient to call for assistance with activity   - Consult OT/PT to assist with strengthening/mobility   - Keep Call bell within reach  - Keep bed low and locked with side rails adjusted as appropriate  - Keep care items and personal belongings within reach  - Initiate and maintain comfort rounds  - Make Fall Risk Sign visible to staff  - Apply yellow socks and bracelet for high fall risk patients  - Consider moving patient to room near nurses station  Outcome: Progressing  Goal: Maintain or return to baseline ADL function  Description: INTERVENTIONS:  -  Assess patient's ability to carry out ADLs; assess patient's baseline for ADL function and identify physical deficits which impact ability to perform ADLs (bathing, care of mouth/teeth, toileting, grooming, dressing, etc )  - Assess/evaluate cause of self-care deficits   - Assess range of motion  - Assess patient's mobility; develop plan if impaired  - Assess patient's need for assistive devices and provide as appropriate  - Encourage maximum independence but intervene and supervise when necessary  - Involve family in performance of ADLs  - Assess for home care needs following discharge   - Consider OT consult to assist with ADL evaluation and planning for discharge  - Provide patient education as appropriate  Outcome: Progressing  Goal: Maintains/Returns to pre admission functional level  Description: INTERVENTIONS:  - Perform BMAT or MOVE assessment daily    - Set and communicate daily mobility goal to care team and patient/family/caregiver     - Out of bed for toileting  - Record patient progress and toleration of activity level   Outcome: Progressing     Problem: DISCHARGE PLANNING  Goal: Discharge to home or other facility with appropriate resources  Description: INTERVENTIONS:  - Identify barriers to discharge w/patient and caregiver  - Arrange for needed discharge resources and transportation as appropriate  - Identify discharge learning needs (meds, wound care, etc )  - Arrange for interpretive services to assist at discharge as needed  - Refer to Case Management Department for coordinating discharge planning if the patient needs post-hospital services based on physician/advanced practitioner order or complex needs related to functional status, cognitive ability, or social support system  Outcome: Progressing     Problem: POSTPARTUM  Goal: Experiences normal postpartum course  Description: INTERVENTIONS:  - Monitor maternal vital signs  - Assess uterine involution and lochia  Outcome: Progressing  Goal: Appropriate maternal -  bonding  Description: INTERVENTIONS:  - Identify family support  - Assess for appropriate maternal/infant bonding   -Encourage maternal/infant bonding opportunities  - Referral to  or  as needed  Outcome: Progressing  Goal: Establishment of infant feeding pattern  Description: INTERVENTIONS:  - Assess breast/bottle feeding  - Refer to lactation as needed  Outcome: Progressing  Goal: Incision(s), wounds(s) or drain site(s) healing without S/S of infection  Description: INTERVENTIONS  - Assess and document dressing, incision, wound bed, drain sites and surrounding tissue  - Provide patient and family education  Outcome: Progressing

## 2022-06-05 VITALS
BODY MASS INDEX: 31.92 KG/M2 | OXYGEN SATURATION: 100 % | WEIGHT: 187 LBS | HEIGHT: 64 IN | SYSTOLIC BLOOD PRESSURE: 131 MMHG | HEART RATE: 84 BPM | RESPIRATION RATE: 16 BRPM | DIASTOLIC BLOOD PRESSURE: 90 MMHG | TEMPERATURE: 98 F

## 2022-06-05 PROBLEM — O34.219 VBAC, DELIVERED, CURRENT HOSPITALIZATION: Status: ACTIVE | Noted: 2022-06-05

## 2022-06-05 PROBLEM — Z3A.37 37 WEEKS GESTATION OF PREGNANCY: Status: ACTIVE | Noted: 2022-06-05

## 2022-06-05 LAB
DME PARACHUTE DELIVERY DATE ACTUAL: NORMAL
DME PARACHUTE DELIVERY DATE REQUESTED: NORMAL
DME PARACHUTE DELIVERY NOTE: NORMAL
DME PARACHUTE ITEM DESCRIPTION: NORMAL
DME PARACHUTE ORDER STATUS: NORMAL
DME PARACHUTE SUPPLIER NAME: NORMAL
DME PARACHUTE SUPPLIER PHONE: NORMAL
GLUCOSE SERPL-MCNC: 127 MG/DL (ref 65–140)

## 2022-06-05 PROCEDURE — 99024 POSTOP FOLLOW-UP VISIT: CPT | Performed by: OBSTETRICS & GYNECOLOGY

## 2022-06-05 PROCEDURE — 82948 REAGENT STRIP/BLOOD GLUCOSE: CPT

## 2022-06-05 RX ORDER — ACETAMINOPHEN 325 MG/1
650 TABLET ORAL EVERY 4 HOURS PRN
Qty: 20 TABLET | Refills: 0 | Status: SHIPPED | OUTPATIENT
Start: 2022-06-05

## 2022-06-05 RX ORDER — IBUPROFEN 600 MG/1
600 TABLET ORAL EVERY 6 HOURS
Qty: 30 TABLET | Refills: 0 | Status: SHIPPED | OUTPATIENT
Start: 2022-06-05

## 2022-06-05 RX ORDER — FERROUS SULFATE TAB EC 324 MG (65 MG FE EQUIVALENT) 324 (65 FE) MG
324 TABLET DELAYED RESPONSE ORAL
Qty: 60 TABLET | Refills: 2 | Status: SHIPPED | OUTPATIENT
Start: 2022-06-05

## 2022-06-05 RX ADMIN — DOCUSATE SODIUM 100 MG: 100 CAPSULE, LIQUID FILLED ORAL at 08:47

## 2022-06-05 RX ADMIN — IBUPROFEN 600 MG: 600 TABLET ORAL at 06:03

## 2022-06-05 NOTE — PLAN OF CARE
Problem: PAIN - ADULT  Goal: Verbalizes/displays adequate comfort level or baseline comfort level  Description: Interventions:  - Encourage patient to monitor pain and request assistance  - Assess pain using appropriate pain scale  - Administer analgesics based on type and severity of pain and evaluate response  - Implement non-pharmacological measures as appropriate and evaluate response  - Consider cultural and social influences on pain and pain management  - Notify physician/advanced practitioner if interventions unsuccessful or patient reports new pain  Outcome: Progressing     Problem: INFECTION - ADULT  Goal: Absence or prevention of progression during hospitalization  Description: INTERVENTIONS:  - Assess and monitor for signs and symptoms of infection  - Monitor lab/diagnostic results  - Monitor all insertion sites, i e  indwelling lines, tubes, and drains  - Monitor endotracheal if appropriate and nasal secretions for changes in amount and color  - [place] appropriate cooling/warming therapies per order  - Administer medications as ordered  - Instruct and encourage patient and family to use good hand hygiene technique  - Identify and instruct in appropriate isolation precautions for identified infection/condition  Outcome: Progressing  Goal: Absence of fever/infection during neutropenic period  Description: INTERVENTIONS:  - Monitor WBC    Outcome: Progressing     Problem: SAFETY ADULT  Goal: Patient will remain free of falls  Description: INTERVENTIONS:  - Educate patient/family on patient safety including physical limitations  - Instruct patient to call for assistance with activity   - Consult OT/PT to assist with strengthening/mobility   - Keep Call bell within reach  - Keep bed low and locked with side rails adjusted as appropriate  - Keep care items and personal belongings within reach  - Initiate and maintain comfort rounds  - Make Fall Risk Sign visible to staff  - Apply yellow socks and bracelet for high fall risk patients  - Consider moving patient to room near nurses station  Outcome: Progressing  Goal: Maintain or return to baseline ADL function  Description: INTERVENTIONS:  -  Assess patient's ability to carry out ADLs; assess patient's baseline for ADL function and identify physical deficits which impact ability to perform ADLs (bathing, care of mouth/teeth, toileting, grooming, dressing, etc )  - Assess/evaluate cause of self-care deficits   - Assess range of motion  - Assess patient's mobility; develop plan if impaired  - Assess patient's need for assistive devices and provide as appropriate  - Encourage maximum independence but intervene and supervise when necessary  - Involve family in performance of ADLs  - Assess for home care needs following discharge   - Consider OT consult to assist with ADL evaluation and planning for discharge  - Provide patient education as appropriate  Outcome: Progressing  Goal: Maintains/Returns to pre admission functional level  Description: INTERVENTIONS:  - Perform BMAT or MOVE assessment daily    - Set and communicate daily mobility goal to care team and patient/family/caregiver     - Collaborate with rehabilitation services on mobility goals if consulted  - Out of bed for toileting  - Record patient progress and toleration of activity level   Outcome: Progressing     Problem: DISCHARGE PLANNING  Goal: Discharge to home or other facility with appropriate resources  Description: INTERVENTIONS:  - Identify barriers to discharge w/patient and caregiver  - Arrange for needed discharge resources and transportation as appropriate  - Identify discharge learning needs (meds, wound care, etc )  - Arrange for interpretive services to assist at discharge as needed  - Refer to Case Management Department for coordinating discharge planning if the patient needs post-hospital services based on physician/advanced practitioner order or complex needs related to functional status, cognitive ability, or social support system  Outcome: Progressing     Problem: POSTPARTUM  Goal: Experiences normal postpartum course  Description: INTERVENTIONS:  - Monitor maternal vital signs  - Assess uterine involution and lochia  Outcome: Progressing  Goal: Appropriate maternal -  bonding  Description: INTERVENTIONS:  - Identify family support  - Assess for appropriate maternal/infant bonding   -Encourage maternal/infant bonding opportunities  - Referral to  or  as needed  Outcome: Progressing  Goal: Establishment of infant feeding pattern  Description: INTERVENTIONS:  - Assess breast/bottle feeding  - Refer to lactation as needed  Outcome: Progressing  Goal: Incision(s), wounds(s) or drain site(s) healing without S/S of infection  Description: INTERVENTIONS  - Assess and document dressing, incision, wound bed, drain sites and surrounding tissue  - Provide patient and family education  - Perform skin care/dressing changes every shift  Outcome: Progressing

## 2022-06-05 NOTE — DISCHARGE SUMMARY
Discharge Summary - Sharilyn Favre 31 y o  female MRN: [id number]    Unit/Bed#: -01 Encounter: [id number]    Admission Date: 6/3/2022     Discharge Date: 22    Admitting Diagnosis:   40 weeks gestation  Term PROM  GDMA2  History of prior  section    Discharge Diagnosis: same    Procedures: vaginal birth after  ()    Attending: Gaby Moctezuma DO    Hospital Course:     Sharilyn Favre is a 31 y o  [id number] who was admitted at 37 6 wks for term PROM  She had history of 1 prior  for NRFHT followed by successful   She underwent an uncomplicated    was transferred to  nursery  Patient tolerated the procedure well and was transferred to recovery in stable condition  On day of discharge, she was ambulating and able to reasonably perform all ADLs  She was voiding and had appropriate bowel function  Pain was well controlled  She was discharged home on postpartum day #2 without complications  Patient was instructed to follow up with her OB as an outpatient and was given appropriate warnings to call provider if she develops signs of infection or uncontrolled pain  Complications: None    Condition at discharge: good     Discharge instructions/Information to patient and family:   See after visit summary for information provided to patient and family  Provisions for Follow-Up Care:  See after visit summary for information related to follow-up care and any pertinent home health orders  Disposition: Home    Planned Readmission: No    Discharge Medications: For a complete list of the patient's medications, please refer to her med rec

## 2022-06-05 NOTE — NURSING NOTE
Discharge teaching done  All handouts and save your life magnet given  Questions encouraged and answered

## 2022-06-05 NOTE — PROGRESS NOTES
Progress Note - OB/GYN  Post-Partum Physician Note   Jelena Mix 31 y o  female MRN: [id number]  Unit/Bed#:  209-01 Encounter: [id number]    Patient is postpartum day 2 from a Spontaneous vaginal delivery  Successful        Brief OB review:  Pain: no  Tolerating Oral Intake: yes  Voiding: yes  Flatus: yes  Ambulating: yes  Breastfeeding: Breastfeeding  Chest Pain: no  Shortness of Breath: no  Leg Pain/Discomfort: no  Lochia: minimal    Objective:   Vitals:    22 0800   BP: 131/90   Pulse: 84   Resp: 16   Temp: 98 °F (36 7 °C)   SpO2:      Physical Exam:  General: in no apparent distress and well developed and well nourished  Lungs: normal effort  Abdomen: abdomen is soft without significant tenderness, masses, organomegaly or guarding  Fundus: Firm and non-tender, 1 below the umbilicus  Lower extremeties: nontender    Labs/Tests:   Lab Results   Component Value Date    WBC 12 40 (H) 2022    HGB 9 0 (L) 2022    HCT 28 1 (L) 2022    MCV 84 2022     2022      MEDS:   Current Facility-Administered Medications   Medication Dose Route Frequency    acetaminophen (TYLENOL) tablet 650 mg  650 mg Oral Q4H PRN    benzocaine-menthol-lanolin-aloe (DERMOPLAST) 20-0 5 % topical spray 1 application  1 application Topical P6G PRN    butorphanol (STADOL) injection 1 mg  1 mg Intravenous Q3H PRN    calcium carbonate (TUMS) chewable tablet 1,000 mg  1,000 mg Oral Daily PRN    docusate sodium (COLACE) capsule 100 mg  100 mg Oral BID    hydrocortisone 1 % cream 1 application  1 application Topical Daily PRN    ibuprofen (MOTRIN) tablet 600 mg  600 mg Oral Q6H    ketorolac (TORADOL) injection 30 mg  30 mg Intravenous Q6H PRN    ondansetron (ZOFRAN) injection 4 mg  4 mg Intravenous Q8H PRN    oxytocin (PITOCIN) 30 Units in lactated ringers 500 mL infusion  1-30 alecia-units/min Intravenous Titrated    sodium chloride 0 9 % infusion  125 mL/hr Intravenous Continuous    witch hazel-glycerin (TUCKS) topical pad 1 pad  1 pad Topical Q4H PRN       Assessment and Plan:  31y o  year-old [id number], postpartum day 2 status-post   Doing well  OK for discharge  PO iron for anemia

## 2022-06-05 NOTE — PLAN OF CARE
Problem: PAIN - ADULT  Goal: Verbalizes/displays adequate comfort level or baseline comfort level  Description: Interventions:  - Encourage patient to monitor pain and request assistance  - Assess pain using appropriate pain scale  - Administer analgesics based on type and severity of pain and evaluate response  - Implement non-pharmacological measures as appropriate and evaluate response  - Consider cultural and social influences on pain and pain management  - Notify physician/advanced practitioner if interventions unsuccessful or patient reports new pain  Outcome: Progressing     Problem: INFECTION - ADULT  Goal: Absence or prevention of progression during hospitalization  Description: INTERVENTIONS:  - Assess and monitor for signs and symptoms of infection  - Monitor lab/diagnostic results  - Monitor all insertion sites, i e  indwelling lines, tubes, and drains  - Monitor endotracheal if appropriate and nasal secretions for changes in amount and color  - [place] appropriate cooling/warming therapies per order  - Administer medications as ordered  - Instruct and encourage patient and family to use good hand hygiene technique  - Identify and instruct in appropriate isolation precautions for identified infection/condition  Outcome: Progressing  Goal: Absence of fever/infection during neutropenic period  Description: INTERVENTIONS:  - Monitor WBC    Outcome: Progressing     Problem: SAFETY ADULT  Goal: Patient will remain free of falls  Description: INTERVENTIONS:  - Educate patient/family on patient safety including physical limitations  - Instruct patient to call for assistance with activity   - Consult OT/PT to assist with strengthening/mobility   - Keep Call bell within reach  - Keep bed low and locked with side rails adjusted as appropriate  - Keep care items and personal belongings within reach  - Initiate and maintain comfort rounds  - Make Fall Risk Sign visible to staff  - Apply yellow socks and bracelet for high fall risk patients  - Consider moving patient to room near nurses station  Outcome: Progressing  Goal: Maintain or return to baseline ADL function  Description: INTERVENTIONS:  -  Assess patient's ability to carry out ADLs; assess patient's baseline for ADL function and identify physical deficits which impact ability to perform ADLs (bathing, care of mouth/teeth, toileting, grooming, dressing, etc )  - Assess/evaluate cause of self-care deficits   - Assess range of motion  - Assess patient's mobility; develop plan if impaired  - Assess patient's need for assistive devices and provide as appropriate  - Encourage maximum independence but intervene and supervise when necessary  - Involve family in performance of ADLs  - Assess for home care needs following discharge   - Consider OT consult to assist with ADL evaluation and planning for discharge  - Provide patient education as appropriate  Outcome: Progressing  Goal: Maintains/Returns to pre admission functional level  Description: INTERVENTIONS:  - Perform BMAT or MOVE assessment daily    - Set and communicate daily mobility goal to care team and patient/family/caregiver     - Collaborate with rehabilitation services on mobility goals if consulted  - Out of bed for toileting  - Record patient progress and toleration of activity level   Outcome: Progressing     Problem: DISCHARGE PLANNING  Goal: Discharge to home or other facility with appropriate resources  Description: INTERVENTIONS:  - Identify barriers to discharge w/patient and caregiver  - Arrange for needed discharge resources and transportation as appropriate  - Identify discharge learning needs (meds, wound care, etc )  - Arrange for interpretive services to assist at discharge as needed  - Refer to Case Management Department for coordinating discharge planning if the patient needs post-hospital services based on physician/advanced practitioner order or complex needs related to functional status, cognitive ability, or social support system  Outcome: Progressing     Problem: POSTPARTUM  Goal: Experiences normal postpartum course  Description: INTERVENTIONS:  - Monitor maternal vital signs  - Assess uterine involution and lochia  Outcome: Progressing  Goal: Appropriate maternal -  bonding  Description: INTERVENTIONS:  - Identify family support  - Assess for appropriate maternal/infant bonding   -Encourage maternal/infant bonding opportunities  - Referral to  or  as needed  Outcome: Progressing  Goal: Establishment of infant feeding pattern  Description: INTERVENTIONS:  - Assess breast/bottle feeding  - Refer to lactation as needed  Outcome: Progressing  Goal: Incision(s), wounds(s) or drain site(s) healing without S/S of infection  Description: INTERVENTIONS  - Assess and document dressing, incision, wound bed, drain sites and surrounding tissue  - Provide patient and family education  Outcome: Progressing

## 2022-06-06 LAB — RPR SER QL: NORMAL

## 2022-06-06 NOTE — UTILIZATION REVIEW
BOTH MOTHER AND BABY DISCHARGED KURT 3    Inpatient Admission Authorization Request   Notification of Maternity/Delivery & [place] Birth Information for Admission   SERVICING FACILITY:   [place] [address], [address]  Tax ID: [id number]  NPI: [id number]  Place of Service: Inpatient [address] [facility]  [address]  Place of Service Code: 24     ATTENDING PROVIDER:  Attending Name and NPI#: Ester Smith [[id number]]  Address: [address] [place] [address]  Phone: [phone]     UTILIZATION REVIEW CONTACT:  Stacey Rapp Utilization   Network Utilization Review Department  Phone: [phone]  Fax [phone]  Email: [email or web address]     PHYSICIAN ADVISORY SERVICES:  FOR FIVX-UF-GOQV REVIEW - MEDICAL NECESSITY DENIAL  Phone: [phone]  Fax: [phone]  Email: [email or web address]     TYPE OF REQUEST:  Inpatient Status     ADMISSION INFORMATION:  ADMISSION DATE/TIME: 6/3/22 11:12 AM  PATIENT DIAGNOSIS CODE/DESCRIPTION:  Vaginal discharge during pregnancy [O26 899, N89 8] The primary encounter diagnosis was , delivered, current hospitalization  A diagnosis of Anemia during pregnancy in third trimester was also pertinent to this visit  1  , delivered, current hospitalization    2   Anemia during pregnancy in third trimester      DISCHARGE DATE/TIME: 2022 11:26 AM   MOTHER AND  INFORMATION:  Mother: Blas Figueroa 1985   Delivering clinician: Artie Mathews   OB History        6    Para   3    Term   3            AB   3    Living   3       SAB   1    IAB        Ectopic   1    Multiple   0    Live Births   1           Obstetric Comments   Menarche: 17            Name & MRN:   Information for the patient's :  Varun Mann Girl Jenna Mathis) [[id number]]      Delivery Information:  Sex: female  Delivered 6/3/2022 7:29 PM by Vaginal, Spontaneous; Gestational Age: 41w10d    [place]uda Measurements:  Weight: 8 lb 9 6 oz (3900 g); Height: 21 25"    APGAR 1 minute 5 minutes 10 minutes   Totals: 4 8       Birth Information: 31 y o  female MRN: [id number] Unit/Bed#: -01 Estimated Date of Delivery: 22  Birthweight: No birth weight on file  Gestational Age: <None> Delivery Type: Vaginal, Spontaneous      IMPORTANT INFORMATION:  Please contact the Stephenie  directly with any questions or concerns regarding this request  Department voicemails are confidential     Send requests for admission clinical reviews, concurrent reviews, approvals, and administrative denials due to lack of clinical to fax [phone]

## 2022-06-14 ENCOUNTER — TELEPHONE (OUTPATIENT)
Dept: OBGYN CLINIC | Facility: CLINIC | Age: 37
End: 2022-06-14

## 2022-06-14 NOTE — TELEPHONE ENCOUNTER
Debra called reporting she is 10 days PP  She is having pelvic pain when nursing  Just went to the bathroom and heard a clot drop in the toilet  Changing pad every 2 hours or so  Reassured- explained breastfeeding causes uterus to contract causing pelvic discomfort  This is normal and what should be happening to aid in returning to uterus to normal size  Not uncommon to have some heavier bleeding around day 10-12   Occassionally clot will block cervix, once clot is passed can experience gush of blood or heavier bleeding  Continue to monitor  Report any clot larger than plum or multiple clots, soaking overnite pad < one hour or if has additional concerns or questions   Patient verbalized understanding and agreement to plan

## 2022-06-24 ENCOUNTER — HOSPITAL ENCOUNTER (INPATIENT)
Facility: HOSPITAL | Age: 37
LOS: 1 days | Discharge: HOME | DRG: 561 | End: 2022-06-25
Attending: OBSTETRICS & GYNECOLOGY | Admitting: OBSTETRICS & GYNECOLOGY
Payer: COMMERCIAL

## 2022-06-24 LAB
ALBUMIN SERPL BCP-MCNC: 3.6 G/DL (ref 3.5–5)
ALP SERPL-CCNC: 177 U/L (ref 46–116)
ALT SERPL W P-5'-P-CCNC: 17 U/L (ref 12–78)
ANION GAP SERPL CALCULATED.3IONS-SCNC: 11 MMOL/L (ref 4–13)
AST SERPL W P-5'-P-CCNC: 12 U/L (ref 5–45)
BILIRUB SERPL-MCNC: 0.2 MG/DL (ref 0.2–1)
BUN SERPL-MCNC: 6 MG/DL (ref 5–25)
CALCIUM SERPL-MCNC: 8.7 MG/DL (ref 8.3–10.1)
CHLORIDE SERPL-SCNC: 104 MMOL/L (ref 100–108)
CO2 SERPL-SCNC: 24 MMOL/L (ref 21–32)
CREAT SERPL-MCNC: 0.56 MG/DL (ref 0.6–1.3)
ERYTHROCYTE [DISTWIDTH] IN BLOOD BY AUTOMATED COUNT: 14 % (ref 11.6–15.1)
GFR SERPL CREATININE-BSD FRML MDRD: 122 ML/MIN/1.73SQ M
GLUCOSE SERPL-MCNC: 97 MG/DL (ref 65–140)
HCT VFR BLD AUTO: 36.8 % (ref 34.8–46.1)
HGB BLD-MCNC: 11.6 G/DL (ref 11.5–15.4)
MAGNESIUM SERPL-MCNC: 3.8 MG/DL (ref 1.6–2.6)
MAGNESIUM SERPL-MCNC: 5.8 MG/DL (ref 1.6–2.6)
MAGNESIUM SERPL-MCNC: 5.9 MG/DL (ref 1.6–2.6)
MCH RBC QN AUTO: 26.5 PG (ref 26.8–34.3)
MCHC RBC AUTO-ENTMCNC: 31.5 G/DL (ref 31.4–37.4)
MCV RBC AUTO: 84 FL (ref 82–98)
PLATELET # BLD AUTO: 335 THOUSANDS/UL (ref 149–390)
PMV BLD AUTO: 9.9 FL (ref 8.9–12.7)
POTASSIUM SERPL-SCNC: 3.5 MMOL/L (ref 3.5–5.3)
PROT SERPL-MCNC: 7.3 G/DL (ref 6.4–8.2)
RBC # BLD AUTO: 4.38 MILLION/UL (ref 3.81–5.12)
SODIUM SERPL-SCNC: 139 MMOL/L (ref 136–145)
WBC # BLD AUTO: 4.88 THOUSAND/UL (ref 4.31–10.16)

## 2022-06-24 PROCEDURE — 80053 COMPREHEN METABOLIC PANEL: CPT | Performed by: OBSTETRICS & GYNECOLOGY

## 2022-06-24 PROCEDURE — 83735 ASSAY OF MAGNESIUM: CPT | Performed by: OBSTETRICS & GYNECOLOGY

## 2022-06-24 PROCEDURE — 85027 COMPLETE CBC AUTOMATED: CPT | Performed by: OBSTETRICS & GYNECOLOGY

## 2022-06-24 PROCEDURE — NC001 PR NO CHARGE: Performed by: OBSTETRICS & GYNECOLOGY

## 2022-06-24 RX ORDER — DIPHENHYDRAMINE HYDROCHLORIDE 50 MG/ML
25 INJECTION INTRAMUSCULAR; INTRAVENOUS EVERY 6 HOURS PRN
Status: DISCONTINUED | OUTPATIENT
Start: 2022-06-24 | End: 2022-06-25 | Stop reason: HOSPADM

## 2022-06-24 RX ORDER — METOCLOPRAMIDE HYDROCHLORIDE 5 MG/ML
10 INJECTION INTRAMUSCULAR; INTRAVENOUS EVERY 6 HOURS PRN
Status: DISCONTINUED | OUTPATIENT
Start: 2022-06-24 | End: 2022-06-25 | Stop reason: HOSPADM

## 2022-06-24 RX ORDER — MAGNESIUM SULFATE HEPTAHYDRATE 40 MG/ML
2 INJECTION, SOLUTION INTRAVENOUS CONTINUOUS
Status: DISCONTINUED | OUTPATIENT
Start: 2022-06-24 | End: 2022-06-25

## 2022-06-24 RX ORDER — SODIUM CHLORIDE, SODIUM LACTATE, POTASSIUM CHLORIDE, CALCIUM CHLORIDE 600; 310; 30; 20 MG/100ML; MG/100ML; MG/100ML; MG/100ML
125 INJECTION, SOLUTION INTRAVENOUS CONTINUOUS
Status: DISCONTINUED | OUTPATIENT
Start: 2022-06-24 | End: 2022-06-25 | Stop reason: HOSPADM

## 2022-06-24 RX ORDER — FAMOTIDINE 20 MG/1
20 TABLET, FILM COATED ORAL 2 TIMES DAILY
Status: DISCONTINUED | OUTPATIENT
Start: 2022-06-24 | End: 2022-06-25 | Stop reason: HOSPADM

## 2022-06-24 RX ORDER — ACETAMINOPHEN 325 MG/1
975 TABLET ORAL EVERY 6 HOURS PRN
Status: DISCONTINUED | OUTPATIENT
Start: 2022-06-24 | End: 2022-06-25 | Stop reason: HOSPADM

## 2022-06-24 RX ADMIN — FAMOTIDINE 20 MG: 20 TABLET ORAL at 10:00

## 2022-06-24 RX ADMIN — MAGNESIUM SULFATE HEPTAHYDRATE 2 G/HR: 40 INJECTION, SOLUTION INTRAVENOUS at 16:39

## 2022-06-24 RX ADMIN — ACETAMINOPHEN 975 MG: 325 TABLET, FILM COATED ORAL at 14:27

## 2022-06-24 RX ADMIN — MAGNESIUM SULFATE HEPTAHYDRATE 2 G/HR: 40 INJECTION, SOLUTION INTRAVENOUS at 07:24

## 2022-06-24 RX ADMIN — DIPHENHYDRAMINE HYDROCHLORIDE 25 MG: 50 INJECTION, SOLUTION INTRAMUSCULAR; INTRAVENOUS at 07:39

## 2022-06-24 RX ADMIN — METOCLOPRAMIDE 10 MG: 5 INJECTION, SOLUTION INTRAMUSCULAR; INTRAVENOUS at 07:34

## 2022-06-24 RX ADMIN — ACETAMINOPHEN 975 MG: 325 TABLET, FILM COATED ORAL at 07:32

## 2022-06-24 RX ADMIN — SODIUM CHLORIDE, SODIUM LACTATE, POTASSIUM CHLORIDE, AND CALCIUM CHLORIDE 75 ML/HR: .6; .31; .03; .02 INJECTION, SOLUTION INTRAVENOUS at 07:19

## 2022-06-24 RX ADMIN — DIPHENHYDRAMINE HYDROCHLORIDE 25 MG: 50 INJECTION, SOLUTION INTRAMUSCULAR; INTRAVENOUS at 14:28

## 2022-06-24 RX ADMIN — SODIUM CHLORIDE, SODIUM LACTATE, POTASSIUM CHLORIDE, AND CALCIUM CHLORIDE 75 ML/HR: .6; .31; .03; .02 INJECTION, SOLUTION INTRAVENOUS at 21:02

## 2022-06-24 RX ADMIN — METOCLOPRAMIDE 10 MG: 5 INJECTION, SOLUTION INTRAMUSCULAR; INTRAVENOUS at 14:28

## 2022-06-24 RX ADMIN — FAMOTIDINE 20 MG: 20 TABLET ORAL at 17:36

## 2022-06-24 NOTE — UTILIZATION REVIEW
Initial Clinical Review    Admission: Date/Time/Statement:   Admission Orders (From admission, onward)     Ordered        22 0716  Inpatient Admission  Once                      Orders Placed This Encounter   Procedures    Inpatient Admission     Standing Status:   Standing     Number of Occurrences:   1     Order Specific Question:   Level of Care     Answer:   Med Surg [16]     Order Specific Question:   Estimated length of stay     Answer:   More than 2 Midnights     Order Specific Question:   Certification     Answer:   I certify that inpatient services are medically necessary for this patient for a duration of greater than two midnights  See H&P and MD Progress Notes for additional information about the patient's course of treatment  ED Arrival Information     Patient not seen in ED                         Initial Presentation: 31 y o  female [id number] s/p  21 days prior transferred from [facility] to [facility] as Inpatient admission w postpartum Pre eclampsia w severe features    PMH  Post partum 21 days presenting w headache & SF BPs, reported to [facility] w chest pain, heartburn, HA  Attempted OTC TUMS  @ [place] chest CT & cardiac eval normal  Transferred here due to space unavailable at [facility]   in triage initial /96,  PLAN monitor BPs, IV MAG infusion; follow Pre eclamptic labs , Tylenol, benadryl for HA     Date: 2022   Day 2:   OB MD Now off Mag for few hours, BP staying wnl to mild elevation  On no antiHTN meds w BP normal to mild elevations & asymptomatic  Desires DC , will monitor home BP, & call OP OB for BP check on MON  No symptoms, all prior symptoms resolved   Cont routine post partum care      Triage Vitals [22 0623]   Temperature Pulse Respirations Blood Pressure SpO2   98 1 °F (36 7 °C) 60 16 138/96 100 %      Temp Source Heart Rate Source Patient Position - Orthostatic VS BP Location FiO2 (%)   Oral Monitor Lying Right arm --      Pain Score 8          Wt Readings from Last 1 Encounters:   06/03/22 84 8 kg (187 lb)     Additional Vital Signs:   Date/Time Temp Pulse Resp BP MAP (mmHg) SpO2 O2 Device Cardiac (WDL) Patient Position - Orthostatic VS   06/25/22 0806 98 °F (36 7 °C) 64 16 141/95 -- 100 % None (Room air) -- Sitting   06/25/22 0739 -- -- -- -- -- -- None (Room air) WDL --   06/25/22 0608 98 1 °F (36 7 °C) 64 16 128/94 -- 100 % None (Room air) -- Lying   06/25/22 0358 98 °F (36 7 °C) 65 16 115/83 -- 99 % None (Room air) -- Lying   06/25/22 0205 98 2 °F (36 8 °C) 72 16 120/82 -- 99 % None (Room air) -- Lying   06/25/22 0005 98 1 °F (36 7 °C) 72 16 130/87 -- 99 % None (Room air) WDL Lying   06/24/22 2207 98 2 °F (36 8 °C) 74 16 135/96 -- 98 % None (Room air) -- Lying   06/24/22 2007 98 4 °F (36 9 °C) 71 16 142/91 -- 99 % None (Room air) -- Lying   06/24/22 1810 -- 69 16 130/95 102 98 % None (Room air) -- Sitting   06/24/22 1600 98 3 °F (36 8 °C) 73 18 129/87 -- 96 % -- WDL --   06/24/22 1415 -- 69 16 123/90 -- 98 % -- -- --   06/24/22 1200 -- 72 18 123/93 -- 100 % -- -- --   06/24/22 1000 -- 64 16 126/85 -- 99 % -- -- --   06/24/22 0757 98 4 °F (36 9 °C) 64 18 142/97 -- 98 % -- -- Lying   06/24/22 0732 -- -- -- -- -- -- None (Room air) WDL --   06/24/22 0623 98 1 °F (36 7 °C) 60 16 138/96 -- 100 % None (Room air) WDL Lying       Weights (last 14 days) before discharge    Date/Time Weight Height   06/24/22 1017 82 kg (180 lb 12 4 oz) 5' 4" (1 626 m)       Pertinent Labs/Diagnostic Test Results:   No orders to display          ED Treatment:   Medication Administration - No Administrations Displayed (No Start Event Found)     None        Past Medical History:   Diagnosis Date    Chlamydia     Chlamydia contact, treated     Herpes simplex     Papanicolaou smear 02/25/2020    neg/neg    Trichomoniasis     Urogenital trichomoniasis      Present on Admission:  **None**      Admitting Diagnosis: Pre-eclampsia in postpartum period [O14 95]  Age/Sex: 31 y o  female  Admission Orders:  Scheduled Medications:   Monitor vitals, pulse oximetry, reflexes & clonus check Q 2hr while on IV MAG   auscultate lung sounds  Continuous IV Infusions:  lactated ringers, 125 mL/hr, Intravenous, Continuous  magnesium sulfate, 2 g/hr, Intravenous, Continuous      PRN Meds:  acetaminophen, 975 mg, Oral, Q6H PRN  diphenhydrAMINE, 25 mg, Intravenous, Q6H PRN  metoclopramide, 10 mg, Intravenous, Q6H PRN    Network Utilization Review Department  ATTENTION: Please call with any questions or concerns to [phone] and carefully listen to the prompts so that you are directed to the right person  All voicemails are confidential   Chelsea Guillen all requests for admission clinical reviews, approved or denied determinations and any other requests to dedicated fax number below belonging to the campus where the patient is receiving treatment   List of dedicated fax numbers for the Facilities:  [address] DENIALS (Administrative/Medical Necessity) [phone]   [address] (Maternity/NICU/Pediatrics) [phone]   [address] [place] [address]  [address] [address] [place] [address] [address] [place] [address] [facility] [facility] [phone]

## 2022-06-24 NOTE — H&P
History & Physical - OB/GYN   Connor Quinonez 31 y o  female MRN: [id number]  Unit/Bed#: -01 Encounter: [id number]    31 y o  yo [id number] s/p  on 6/3  She is a patient of [facility]    CC:   Chest pain  She presented with complaint of chest pain that she says is in the center of chest and feels like "heartburn " She tried tums which did not help  SBP noted to be 160s while in ER  She was treated with hydralazine and mag infusion started  She is also complaining of HA  No vision change or abd pain  In [address] [place] [address] ED she had chest CT and cardiac eval which were normal  She was transferred to our L&D because there were no available beds at [address] [place] [address]         Pregnancy Complications:  Patient Active Problem List   Diagnosis    BV (bacterial vaginosis)    Herpes simplex    Maternal care due to low transverse uterine scar from previous  delivery    Anemia during pregnancy in third trimester    History of     Insulin controlled gestational diabetes mellitus (GDM) in third trimester    36 weeks gestation of pregnancy    Hyperglycemia in pregnancy    BMI 31 0-31 9,adult    Postpartum anemia    37 weeks gestation of pregnancy    , delivered, current hospitalization         PMH:  Past Medical History:   Diagnosis Date    Chlamydia     Chlamydia contact, treated     Herpes simplex     Papanicolaou smear 2020    neg/neg    Trichomoniasis     Urogenital trichomoniasis        PSH:  Past Surgical History:   Procedure Laterality Date     SECTION         Social Hx:      OB Hx:  OB History    Para Term  AB Living   6 3 3 0 3 3   SAB IAB Ectopic Multiple Live Births   1 0 1 0 1      # Outcome Date GA Lbr Juan/2nd Weight Sex Delivery Anes PTL Lv   6 Term 22 37w6d / 01:03 3900 g (8 lb 9 6 oz) F Vag-Spont EPI N SPENSER      Birth Comments: Left shoulder dystocia; Dr Christine Hall present for delivery       Complications: Shoulder Dystocia      Name: ZAMUDIO,BABY GIRL (NATHALIE)      Apgar1: 4  Apgar5: 8   5 SAB 2021           4 Term 13   3317 g (7 lb 5 oz) M Vag-Spont  N    3 AB 2006           2 Term 05   3827 g (8 lb 7 oz) M CS-LTranv EPI N       Birth Comments:  -fetal heaart deceleration   1 Ectopic               Obstetric Comments   Menarche: 15       Meds:  No current facility-administered medications on file prior to encounter  Current Outpatient Medications on File Prior to Encounter   Medication Sig Dispense Refill    acetaminophen (TYLENOL) 325 mg tablet Take 2 tablets (650 mg total) by mouth every 4 (four) hours as needed for mild pain 20 tablet 0    BD Pen Needle Kimberly 2nd Gen 32G X 4 MM MISC use 1 PEN NEEDLE to inject MEDICATION subcutaneously twice a day or as directed      Blood Glucose Monitoring Suppl (OneTouch Verio Reflect) w/Device KIT Use as directed      ferrous sulfate 324 (65 Fe) mg Take 1 tablet (324 mg total) by mouth 2 (two) times a day before meals 60 tablet 2    ibuprofen (MOTRIN) 600 mg tablet Take 1 tablet (600 mg total) by mouth every 6 (six) hours 30 tablet 0    Insulin Pen Needle 31G X 5 MM MISC Inject under the skin daily at bedtime Use 2 a day or as directed  100 each 0    ketoconazole (NIZORAL) 2 % shampoo APPLY TO THE AFFECTED AREA(S) TOPICALLY TWICE WEEKLY      OneTouch Delica Lancets 14C MISC 4 times a day    each 3    Prenatal Vit-Fe Sulfate-FA-DHA (Ultra Prenatal + DHA) 27-0 8-200 MG CAPS Take 1 tablet by mouth in the morning 30 capsule 12       Allergies:  No Known Allergies    Labs:      Physical Exam:  /96 (BP Location: Right arm)   Pulse 60   Temp 98 1 °F (36 7 °C) (Oral)   Resp 16   LMP 2021 (Exact Date)   SpO2 100%     HEENT: atraumatic, normocephalic  Heart: RRR, NMRG  Lungs: CTA b/l, no wrr  Abdomen: soft, non-tender, non-distended  Extremities: non-tender, no edema  Neuro: reflexes 2+ throughout      Assessment:   31 y o  [id number] PPD #21 s/p  with postpartum pre-E, severe criteria by BP/HA  Plan:   Admit to L&D  Continue Magnesium infusion for seizure prophylaxis  Will monitor BP closely  Pre-E labs ordered  Tylenol, reglan, benadryl for HA       Discussed with Dr Urszula Flynn

## 2022-06-24 NOTE — PROGRESS NOTES
Progress Note - OB/GYN   Eladio Stall 31 y o  female MRN: [id number]  Unit/Bed#: -01 Encounter: [id number]      Update:    Magnesium level reported as 5 8mg/dL, within therapeutic range of 4 8-8 4mg/dL      Ad Andrews MD

## 2022-06-24 NOTE — UTILIZATION REVIEW
Inpatient Admission Authorization Request   NOTIFICATION OF INPATIENT ADMISSION/INPATIENT AUTHORIZATION REQUEST   SERVICING FACILITY:   [address] [address]  Tax ID: [id number]  NPI: [id number]  Place of Service: Inpatient [address] [facility]  [address]  Place of Service Code: 24     ATTENDING PROVIDER:  Attending Name and NPI#: Trixie Mariza Andrade [[id number]]  Address: [address] [place] [address]  Phone: [phone]     UTILIZATION REVIEW CONTACT:  Tim Le , Utilization   Network Utilization Review Department  Phone: [phone]  Fax [phone]  Email: [email or web address]     PHYSICIAN ADVISORY SERVICES:  FOR WJFD-BN-HLPT REVIEW - MEDICAL NECESSITY DENIAL  Phone: [phone]  Fax: [phone]  Email: [email or web address]     TYPE OF REQUEST:  Inpatient Status     ADMISSION INFORMATION:  ADMISSION DATE/TIME: 6/24/22  7:16 AM  PATIENT DIAGNOSIS CODE/DESCRIPTION:  Pre-eclampsia in postpartum period [O14 95]  DISCHARGE DATE/TIME: No discharge date for patient encounter  IMPORTANT INFORMATION:  Please contact Tim Le  directly with any questions or concerns regarding this request  Department voicemails are confidential     Send requests for admission clinical reviews, concurrent reviews, approvals, and administrative denials due to lack of clinical to fax [phone]

## 2022-06-24 NOTE — PLAN OF CARE
Problem: PAIN - ADULT  Goal: Verbalizes/displays adequate comfort level or baseline comfort level  Description: Interventions:  - Encourage patient to monitor pain and request assistance  - Assess pain using appropriate pain scale  - Administer analgesics based on type and severity of pain and evaluate response  - Implement non-pharmacological measures as appropriate and evaluate response  - Consider cultural and social influences on pain and pain management  - Notify physician/advanced practitioner if interventions unsuccessful or patient reports new pain  Outcome: Progressing     Problem: INFECTION - ADULT  Goal: Absence or prevention of progression during hospitalization  Description: INTERVENTIONS:  - Assess and monitor for signs and symptoms of infection  - Monitor lab/diagnostic results  - Monitor all insertion sites, i e  indwelling lines, tubes, and drains  - Monitor endotracheal if appropriate and nasal secretions for changes in amount and color  - [place] appropriate cooling/warming therapies per order  - Administer medications as ordered  - Instruct and encourage patient and family to use good hand hygiene technique  - Identify and instruct in appropriate isolation precautions for identified infection/condition  Outcome: Progressing  Goal: Absence of fever/infection during neutropenic period  Description: INTERVENTIONS:  - Monitor WBC    Outcome: Progressing     Problem: SAFETY ADULT  Goal: Patient will remain free of falls  Description: INTERVENTIONS:  - Educate patient/family on patient safety including physical limitations  - Instruct patient to call for assistance with activity   - Consult OT/PT to assist with strengthening/mobility   - Keep Call bell within reach  - Keep bed low and locked with side rails adjusted as appropriate  - Keep care items and personal belongings within reach  - Initiate and maintain comfort rounds    Outcome: Progressing  Goal: Maintain or return to baseline ADL function  Description: INTERVENTIONS:  -  Assess patient's ability to carry out ADLs; assess patient's baseline for ADL function and identify physical deficits which impact ability to perform ADLs (bathing, care of mouth/teeth, toileting, grooming, dressing, etc )  - Assess/evaluate cause of self-care deficits   - Assess range of motion  - Assess patient's mobility; develop plan if impaired  - Assess patient's need for assistive devices and provide as appropriate  - Encourage maximum independence but intervene and supervise when necessary  - Involve family in performance of ADLs  - Assess for home care needs following discharge   - Consider OT consult to assist with ADL evaluation and planning for discharge  - Provide patient education as appropriate  Outcome: Progressing  Goal: Maintains/Returns to pre admission functional level  Description: INTERVENTIONS:  - Perform BMAT or MOVE assessment daily    - Set and communicate daily mobility goal to care team and patient/family/caregiver     - Collaborate with rehabilitation services on mobility goals if consulted  - Out of bed for toileting  - Record patient progress and toleration of activity level   Outcome: Progressing     Problem: DISCHARGE PLANNING  Goal: Discharge to home or other facility with appropriate resources  Description: INTERVENTIONS:  - Identify barriers to discharge w/patient and caregiver  - Arrange for needed discharge resources and transportation as appropriate  - Identify discharge learning needs (meds, wound care, etc )  - Arrange for interpretive services to assist at discharge as needed  - Refer to Case Management Department for coordinating discharge planning if the patient needs post-hospital services based on physician/advanced practitioner order or complex needs related to functional status, cognitive ability, or social support system  Outcome: Progressing     Problem: Knowledge Deficit  Goal: Patient/family/caregiver demonstrates understanding of disease process, treatment plan, medications, and discharge instructions  Description: Complete learning assessment and assess knowledge base    Interventions:  - Provide teaching at level of understanding  - Provide teaching via preferred learning methods  Outcome: Progressing

## 2022-06-25 VITALS
TEMPERATURE: 98 F | WEIGHT: 180.78 LBS | BODY MASS INDEX: 30.86 KG/M2 | HEIGHT: 64 IN | SYSTOLIC BLOOD PRESSURE: 141 MMHG | DIASTOLIC BLOOD PRESSURE: 95 MMHG | OXYGEN SATURATION: 100 % | RESPIRATION RATE: 16 BRPM | HEART RATE: 64 BPM

## 2022-06-25 LAB — MAGNESIUM SERPL-MCNC: 5.5 MG/DL (ref 1.6–2.6)

## 2022-06-25 PROCEDURE — NC001 PR NO CHARGE: Performed by: OBSTETRICS & GYNECOLOGY

## 2022-06-25 PROCEDURE — 83735 ASSAY OF MAGNESIUM: CPT | Performed by: OBSTETRICS & GYNECOLOGY

## 2022-06-25 RX ADMIN — DIPHENHYDRAMINE HYDROCHLORIDE 25 MG: 50 INJECTION, SOLUTION INTRAMUSCULAR; INTRAVENOUS at 00:15

## 2022-06-25 RX ADMIN — ACETAMINOPHEN 975 MG: 325 TABLET, FILM COATED ORAL at 11:16

## 2022-06-25 RX ADMIN — METOCLOPRAMIDE 10 MG: 5 INJECTION, SOLUTION INTRAMUSCULAR; INTRAVENOUS at 00:12

## 2022-06-25 RX ADMIN — FAMOTIDINE 20 MG: 20 TABLET ORAL at 09:15

## 2022-06-25 RX ADMIN — MAGNESIUM SULFATE HEPTAHYDRATE 2 G/HR: 40 INJECTION, SOLUTION INTRAVENOUS at 03:48

## 2022-06-25 RX ADMIN — ACETAMINOPHEN 975 MG: 325 TABLET, FILM COATED ORAL at 00:11

## 2022-06-25 NOTE — DISCHARGE INSTRUCTIONS
Preeclampsia and Eclampsia After Delivery   AMBULATORY CARE:   What you need to know about preeclampsia and eclampsia after delivery:  Preeclampsia is high blood pressure (BP) that usually develops after week 20 of pregnancy  It can also develop days to weeks after delivery, even if you did not have high BP during pregnancy  When it develops after delivery, it may also be called postpartum preeclampsia  Preeclampsia causes your BP to be 140/90 or higher  You may also have protein in your urine or damage to organs such as your kidneys or liver  Preeclampsia can lead to life-threatening conditions such as a stroke or HELLP syndrome (blood cell destruction)  It can also lead to eclampsia, a condition that causes seizures from high BP  Warning signs to watch for:   BP of 130/80 or higher    Shortness of breath    Nausea and vomiting, or severe stomach pain    Severe headaches    Vision changes, or seeing spots in front of your eyes    Arm or leg swelling    Call your local emergency number (911 in the [address] [facility] [facility]) if:   You have a seizure  You have chest pain  You have severe nausea and vomiting  Call your doctor or obstetrician if:   You develop a severe headache that does not go away  You have new or increased vision changes, such as blurred or spotted vision  You have new or increased swelling in your face or hands  You have severe abdominal pain with or without nausea and vomiting  You have shortness of breath  Your blood pressure is higher than you were told it should be  You have questions or concerns about your condition or care  Manage or prevent preeclampsia or eclampsia after delivery:   Go to follow-up appointments as directed  Your obstetrician will check your blood pressure regularly until it is normal  He or she may also order other tests  Take medicines as directed  Medicines may be given to lower your blood pressure, protect your organs, or prevent seizures      Rest during the day  Your healthcare provider may tell you to rest more often if you have mild symptoms of preeclampsia  Do not drink alcohol or smoke  Alcohol, nicotine, and other chemicals in cigarettes and cigars, can increase your BP  They can also harm your baby  Ask your healthcare provider for information if you currently drink alcohol or smoke and need help to quit  E-cigarettes or smokeless tobacco still contain nicotine  Talk to your healthcare provider before you use these products  Follow up with your doctor or obstetrician as directed:  Write down your questions so you remember to ask them during your visits  © Copyright [employer] 2022 Information is for End User's use only and may not be sold, redistributed or otherwise used for commercial purposes  All illustrations and images included in CareNotes® are the copyrighted property of [employer]  or Flora Carranza   The above information is an  only  It is not intended as medical advice for individual conditions or treatments  Talk to your doctor, nurse or pharmacist before following any medical regimen to see if it is safe and effective for you

## 2022-06-25 NOTE — PROGRESS NOTES
Progress Note - OB/GYN  Post-Partum Physician Note   Kayy Sick 31 y o  female MRN: [id number]  Unit/Bed#: -01 Encounter: [id number]    Patient is post PARTUM  from an  on 6/3    Now off Mag for few hours, BP staying wnl to mild elevation  No symptoms, all prior symptoms resolved        Objective:   Vitals:    22 0806   BP: 141/95   Pulse: 64   Resp: 16   Temp: 98 °F (36 7 °C)   SpO2: 100%       Intake/Output Summary (Last 24 hours) at 2022 1058  Last data filed at 2022 0655  Gross per 24 hour   Intake 1880 ml   Output 3100 ml   Net -1220 ml       Physical Exam:  General: awake alert oriented  Cardiovascular: RRR  Lungs: clear  Abdomen: soft, nontender, fundus below umbilicus  Lower extremeties:minimal edema, no sign DVT        MEDS:   Current Facility-Administered Medications   Medication Dose Route Frequency    acetaminophen (TYLENOL) tablet 975 mg  975 mg Oral Q6H PRN    diphenhydrAMINE (BENADRYL) injection 25 mg  25 mg Intravenous Q6H PRN    famotidine (PEPCID) tablet 20 mg  20 mg Oral BID    lactated ringers infusion  125 mL/hr Intravenous Continuous    metoclopramide (REGLAN) injection 10 mg  10 mg Intravenous Q6H PRN       Invasive Devices  Timeline    Peripheral Intravenous Line  Duration           Peripheral IV 22 Left Antecubital 1 day                Assessment and Plan:  31y o  year-old [id number], postpartum  status-post  admitted with PreE with severe features  S/P 24 hrs mag, on no antihypertensives and BP normal to mild elevations  Asymptomatic      Desires d/c will check BP at home and call [facility]  on Monday for fu appt for BP check, will call or return for any significant elevations at home or return of symptoms        Continue routine PP care

## 2022-06-25 NOTE — PLAN OF CARE
Problem: PAIN - ADULT  Goal: Verbalizes/displays adequate comfort level or baseline comfort level  Description: Interventions:  - Encourage patient to monitor pain and request assistance  - Assess pain using appropriate pain scale  - Administer analgesics based on type and severity of pain and evaluate response  - Implement non-pharmacological measures as appropriate and evaluate response  - Consider cultural and social influences on pain and pain management  - Notify physician/advanced practitioner if interventions unsuccessful or patient reports new pain  Outcome: Progressing     Problem: INFECTION - ADULT  Goal: Absence or prevention of progression during hospitalization  Description: INTERVENTIONS:  - Assess and monitor for signs and symptoms of infection  - Monitor lab/diagnostic results  - Monitor all insertion sites, i e  indwelling lines, tubes, and drains  - Monitor endotracheal if appropriate and nasal secretions for changes in amount and color  - [place] appropriate cooling/warming therapies per order  - Administer medications as ordered  - Instruct and encourage patient and family to use good hand hygiene technique  - Identify and instruct in appropriate isolation precautions for identified infection/condition  Outcome: Progressing  Goal: Absence of fever/infection during neutropenic period  Description: INTERVENTIONS:  - Monitor WBC    Outcome: Progressing     Problem: SAFETY ADULT  Goal: Patient will remain free of falls  Description: INTERVENTIONS:  - Educate patient/family on patient safety including physical limitations  - Instruct patient to call for assistance with activity   - Consult OT/PT to assist with strengthening/mobility   - Keep Call bell within reach  - Keep bed low and locked with side rails adjusted as appropriate  - Keep care items and personal belongings within reach  - Initiate and maintain comfort rounds  - Make Fall Risk Sign visible to staff  - Apply yellow socks and bracelet for high fall risk patients  - Consider moving patient to room near nurses station  Outcome: Progressing  Goal: Maintain or return to baseline ADL function  Description: INTERVENTIONS:  -  Assess patient's ability to carry out ADLs; assess patient's baseline for ADL function and identify physical deficits which impact ability to perform ADLs (bathing, care of mouth/teeth, toileting, grooming, dressing, etc )  - Assess/evaluate cause of self-care deficits   - Assess range of motion  - Assess patient's mobility; develop plan if impaired  - Assess patient's need for assistive devices and provide as appropriate  - Encourage maximum independence but intervene and supervise when necessary  - Involve family in performance of ADLs  - Assess for home care needs following discharge   - Consider OT consult to assist with ADL evaluation and planning for discharge  - Provide patient education as appropriate  Outcome: Progressing  Goal: Maintains/Returns to pre admission functional level  Description: INTERVENTIONS:  - Perform BMAT or MOVE assessment daily    - Set and communicate daily mobility goal to care team and patient/family/caregiver     - Collaborate with rehabilitation services on mobility goals if consulted  - Out of bed for toileting  - Record patient progress and toleration of activity level   Outcome: Progressing     Problem: DISCHARGE PLANNING  Goal: Discharge to home or other facility with appropriate resources  Description: INTERVENTIONS:  - Identify barriers to discharge w/patient and caregiver  - Arrange for needed discharge resources and transportation as appropriate  - Identify discharge learning needs (meds, wound care, etc )  - Arrange for interpretive services to assist at discharge as needed  - Refer to Case Management Department for coordinating discharge planning if the patient needs post-hospital services based on physician/advanced practitioner order or complex needs related to functional status, cognitive ability, or social support system  Outcome: Progressing     Problem: Knowledge Deficit  Goal: Patient/family/caregiver demonstrates understanding of disease process, treatment plan, medications, and discharge instructions  Description: Complete learning assessment and assess knowledge base    Interventions:  - Provide teaching at level of understanding  - Provide teaching via preferred learning methods  Outcome: Progressing

## 2022-06-25 NOTE — DISCHARGE SUMMARY
Discharge Summary - Kayy Tellez 31 y o  female MRN: [id number]    Unit/Bed#: -01 Encounter: [id number]    Admission Date:   Admission Orders (From admission, onward)     Ordered        06/24/22 0716  Inpatient Admission  Once                        Admitting Diagnosis: Pre-eclampsia in postpartum period [O14 95]    HPI: headache and epigastric pain, came to ED [place] and transferred here as no OB bed available there  Procedures Performed: No orders of the defined types were placed in this encounter  Summary of Hospital Course: admitted with elevated BP, given mag sulfate for 24 hours, BP normalized, symptoms resolved    Significant Findings, Care, Treatment and Services Provided: pre E with severe features, now treated with mag for 24 hours    Complications: none    Discharge Diagnosis: preE with severe features    Medical Problems             Resolved Problems  Date Reviewed: 6/2/2022   None                 Condition at Discharge: good         Discharge instructions/Information to patient and family:   See after visit summary for information provided to patient and family  Provisions for Follow-Up Care:  See after visit summary for information related to follow-up care and any pertinent home health orders  PCP: Flakito Clemente MD    Disposition: See After Visit Summary for discharge disposition information  Planned Readmission: No      Discharge Statement   I spent 20 minutes discharging the patient  This time was spent on the day of discharge  I had direct contact with the patient on the day of discharge  Additional documentation is required if more than 30 minutes were spent on discharge  Discharge Medications:  See after visit summary for reconciled discharge medications provided to patient and family

## 2022-06-25 NOTE — PLAN OF CARE
Problem: PAIN - ADULT  Goal: Verbalizes/displays adequate comfort level or baseline comfort level  Description: Interventions:  - Encourage patient to monitor pain and request assistance  - Assess pain using appropriate pain scale  - Administer analgesics based on type and severity of pain and evaluate response  - Implement non-pharmacological measures as appropriate and evaluate response  - Consider cultural and social influences on pain and pain management  - Notify physician/advanced practitioner if interventions unsuccessful or patient reports new pain  Outcome: Progressing     Problem: INFECTION - ADULT  Goal: Absence or prevention of progression during hospitalization  Description: INTERVENTIONS:  - Assess and monitor for signs and symptoms of infection  - Monitor lab/diagnostic results  - Monitor all insertion sites, i e  indwelling lines, tubes, and drains  - Monitor endotracheal if appropriate and nasal secretions for changes in amount and color  - [place] appropriate cooling/warming therapies per order  - Administer medications as ordered  - Instruct and encourage patient and family to use good hand hygiene technique  - Identify and instruct in appropriate isolation precautions for identified infection/condition  Outcome: Progressing  Goal: Absence of fever/infection during neutropenic period  Description: INTERVENTIONS:  - Monitor WBC    Outcome: Progressing     Problem: SAFETY ADULT  Goal: Patient will remain free of falls  Description: INTERVENTIONS:  - Educate patient/family on patient safety including physical limitations  - Instruct patient to call for assistance with activity   - Consult OT/PT to assist with strengthening/mobility   - Keep Call bell within reach  - Keep bed low and locked with side rails adjusted as appropriate  - Keep care items and personal belongings within reach  - Initiate and maintain comfort rounds  - Make Fall Risk Sign visible to staff  - Apply yellow socks and bracelet for high fall risk patients  - Consider moving patient to room near nurses station  Outcome: Progressing  Goal: Maintain or return to baseline ADL function  Description: INTERVENTIONS:  -  Assess patient's ability to carry out ADLs; assess patient's baseline for ADL function and identify physical deficits which impact ability to perform ADLs (bathing, care of mouth/teeth, toileting, grooming, dressing, etc )  - Assess/evaluate cause of self-care deficits   - Assess range of motion  - Assess patient's mobility; develop plan if impaired  - Assess patient's need for assistive devices and provide as appropriate  - Encourage maximum independence but intervene and supervise when necessary  - Involve family in performance of ADLs  - Assess for home care needs following discharge   - Consider OT consult to assist with ADL evaluation and planning for discharge  - Provide patient education as appropriate  Outcome: Progressing  Goal: Maintains/Returns to pre admission functional level  Description: INTERVENTIONS:  - Perform BMAT or MOVE assessment daily    - Set and communicate daily mobility goal to care team and patient/family/caregiver     - Collaborate with rehabilitation services on mobility goals if consulted  - Out of bed for toileting  - Record patient progress and toleration of activity level   Outcome: Progressing     Problem: DISCHARGE PLANNING  Goal: Discharge to home or other facility with appropriate resources  Description: INTERVENTIONS:  - Identify barriers to discharge w/patient and caregiver  - Arrange for needed discharge resources and transportation as appropriate  - Identify discharge learning needs (meds, wound care, etc )  - Arrange for interpretive services to assist at discharge as needed  - Refer to Case Management Department for coordinating discharge planning if the patient needs post-hospital services based on physician/advanced practitioner order or complex needs related to functional status, cognitive ability, or social support system  Outcome: Progressing     Problem: Knowledge Deficit  Goal: Patient/family/caregiver demonstrates understanding of disease process, treatment plan, medications, and discharge instructions  Description: Complete learning assessment and assess knowledge base    Interventions:  - Provide teaching at level of understanding  - Provide teaching via preferred learning methods  Outcome: Progressing

## 2022-06-25 NOTE — PLAN OF CARE
Problem: PAIN - ADULT  Goal: Verbalizes/displays adequate comfort level or baseline comfort level  Description: Interventions:  - Encourage patient to monitor pain and request assistance  - Assess pain using appropriate pain scale  - Administer analgesics based on type and severity of pain and evaluate response  - Implement non-pharmacological measures as appropriate and evaluate response  - Consider cultural and social influences on pain and pain management  - Notify physician/advanced practitioner if interventions unsuccessful or patient reports new pain  6/25/2022 1133 by Dimple Garvin RN  Outcome: Completed  6/25/2022 0744 by Dimple Garvin RN  Outcome: Progressing     Problem: INFECTION - ADULT  Goal: Absence or prevention of progression during hospitalization  Description: INTERVENTIONS:  - Assess and monitor for signs and symptoms of infection  - Monitor lab/diagnostic results  - Monitor all insertion sites, i e  indwelling lines, tubes, and drains  - Monitor endotracheal if appropriate and nasal secretions for changes in amount and color  - [place] appropriate cooling/warming therapies per order  - Administer medications as ordered  - Instruct and encourage patient and family to use good hand hygiene technique  - Identify and instruct in appropriate isolation precautions for identified infection/condition  6/25/2022 1133 by Dimple Garvin RN  Outcome: Completed  6/25/2022 0744 by Dimple Garvin RN  Outcome: Progressing  Goal: Absence of fever/infection during neutropenic period  Description: INTERVENTIONS:  - Monitor WBC    6/25/2022 1133 by Dimple Garvin RN  Outcome: Completed  6/25/2022 0744 by Dimple Garvin RN  Outcome: Progressing     Problem: SAFETY ADULT  Goal: Patient will remain free of falls  Description: INTERVENTIONS:  - Educate patient/family on patient safety including physical limitations  - Instruct patient to call for assistance with activity   - Consult OT/PT to assist with strengthening/mobility   - Keep Call bell within reach  - Keep bed low and locked with side rails adjusted as appropriate  - Keep care items and personal belongings within reach  - Initiate and maintain comfort rounds  - Make Fall Risk Sign visible to staff  - Apply yellow socks and bracelet for high fall risk patients  - Consider moving patient to room near nurses station  6/25/2022 1133 by Dimple Garvin RN  Outcome: Completed  6/25/2022 0744 by Dimple Garvin RN  Outcome: Progressing  Goal: Maintain or return to baseline ADL function  Description: INTERVENTIONS:  -  Assess patient's ability to carry out ADLs; assess patient's baseline for ADL function and identify physical deficits which impact ability to perform ADLs (bathing, care of mouth/teeth, toileting, grooming, dressing, etc )  - Assess/evaluate cause of self-care deficits   - Assess range of motion  - Assess patient's mobility; develop plan if impaired  - Assess patient's need for assistive devices and provide as appropriate  - Encourage maximum independence but intervene and supervise when necessary  - Involve family in performance of ADLs  - Assess for home care needs following discharge   - Consider OT consult to assist with ADL evaluation and planning for discharge  - Provide patient education as appropriate  6/25/2022 1133 by Dimple Garvin RN  Outcome: Completed  6/25/2022 0744 by Dimple Garvin RN  Outcome: Progressing  Goal: Maintains/Returns to pre admission functional level  Description: INTERVENTIONS:  - Perform BMAT or MOVE assessment daily    - Set and communicate daily mobility goal to care team and patient/family/caregiver     - Collaborate with rehabilitation services on mobility goals if consulted  - Out of bed for toileting  - Record patient progress and toleration of activity level   6/25/2022 1133 by Dimple Garvin RN  Outcome: Completed  6/25/2022 0744 by Dimple Garvin RN  Outcome: Progressing     Problem: DISCHARGE PLANNING  Goal: Discharge to home or other facility with appropriate resources  Description: INTERVENTIONS:  - Identify barriers to discharge w/patient and caregiver  - Arrange for needed discharge resources and transportation as appropriate  - Identify discharge learning needs (meds, wound care, etc )  - Arrange for interpretive services to assist at discharge as needed  - Refer to Case Management Department for coordinating discharge planning if the patient needs post-hospital services based on physician/advanced practitioner order or complex needs related to functional status, cognitive ability, or social support system  6/25/2022 1133 by Ayana Linn RN  Outcome: Completed  6/25/2022 0744 by Ayana Linn RN  Outcome: Progressing     Problem: Knowledge Deficit  Goal: Patient/family/caregiver demonstrates understanding of disease process, treatment plan, medications, and discharge instructions  Description: Complete learning assessment and assess knowledge base    Interventions:  - Provide teaching at level of understanding  - Provide teaching via preferred learning methods  6/25/2022 1133 by Ayana Linn RN  Outcome: Completed  6/25/2022 0744 by Ayana Linn RN  Outcome: Progressing

## 2022-06-27 ENCOUNTER — CLINICAL SUPPORT (OUTPATIENT)
Dept: OBGYN CLINIC | Facility: CLINIC | Age: 37
End: 2022-06-27
Payer: COMMERCIAL

## 2022-06-27 VITALS
SYSTOLIC BLOOD PRESSURE: 140 MMHG | DIASTOLIC BLOOD PRESSURE: 90 MMHG | WEIGHT: 171 LBS | BODY MASS INDEX: 29.19 KG/M2 | HEIGHT: 64 IN

## 2022-06-27 PROCEDURE — 99212 OFFICE O/P EST SF 10 MIN: CPT | Performed by: OBSTETRICS & GYNECOLOGY

## 2022-06-27 NOTE — PROGRESS NOTES
Patient came in for blood pressure check  She was seen over the weekend at [facility] L/D for post partum pre eclampsia and rec'd MgSO4 infusion  She was instructed to follow-up for BP on Monday  BP in office was 140/90  Denies symptom of headache/dizziness/visual disturbance  Spoke with on-call provider and provided BP reading and advised to have her continue to monitor, take it easy and if symptoms return to please call us  Advised pt with Dr Sandee Keenan message  She verbalized understanding

## 2022-06-27 NOTE — UTILIZATION REVIEW
Inpatient Admission Authorization Request   NOTIFICATION OF INPATIENT ADMISSION/INPATIENT AUTHORIZATION REQUEST   SERVICING FACILITY:   [place] [address]  Tax ID: [id number]  NPI: [id number]  Place of Service: Inpatient [address] [facility]  [address]  Place of Service Code: 24     ATTENDING PROVIDER:  Attending Name and NPI#: Tina Olivarez, Juvenal Beaver [[id number]]  Address: [address] [place] [address]  Phone: [phone]     UTILIZATION REVIEW CONTACT:  Selina Covarrubias, Utilization   Network Utilization Review Department  Phone: [phone]  Fax [phone]  Email: [email or web address]     PHYSICIAN ADVISORY SERVICES:  FOR [id number] REVIEW - MEDICAL NECESSITY DENIAL  Phone: [phone]  Fax: [phone]  Email: [email or web address]     TYPE OF REQUEST:  Inpatient Status     ADMISSION INFORMATION:  ADMISSION DATE/TIME: 6/24/22  7:16 AM  PATIENT DIAGNOSIS CODE/DESCRIPTION:  Pre-eclampsia in postpartum period [O14 95]  DISCHARGE DATE/TIME: 6/25/2022 11:56 AM   IMPORTANT INFORMATION:  Please contact Gayathri Alejandro directly with any questions or concerns regarding this request  Department voicemails are confidential     Send requests for admission clinical reviews, concurrent reviews, approvals, and administrative denials due to lack of clinical to fax [phone]  Notification of Discharge   This is a Notification of Discharge from our facility [address]  Please be advised that this patient has been discharge from our facility  Below you will find the admission and discharge date and time including the patients disposition  UTILIZATION REVIEW CONTACT:  Ramandeep De León  Utilization   Network Utilization Review Department  Phone: [phone] x carefully listen to the prompts  All voicemails are confidential   Email: [email or web address]     PHYSICIAN ADVISORY SERVICES:  FOR [id number] REVIEW - MEDICAL NECESSITY DENIAL  Phone: [phone]  Fax: [phone]  Email: [email or web address]     PRESENTATION DATE: 6/24/2022  6:18 AM  OBERVATION ADMISSION DATE:   INPATIENT ADMISSION DATE: 6/24/22  7:16 AM   DISCHARGE DATE: 6/25/2022 11:56 AM  DISPOSITION: Home/Self Care Home/Self Care      IMPORTANT INFORMATION:  Send all requests for admission clinical reviews, approved or denied determinations and any other requests to dedicated fax number below belonging to the campus where the patient is receiving treatment   List of dedicated fax numbers:  [address] [place] [address] DENIALS (Administrative/Medical Necessity) [phone]   [address] (Maternity/NICU/Pediatrics) [phone]   [facility] [phone]   [address] [facility] [phone]   [address] [place] [address] [phone]   [address] [phone]   [facility]  [phone]   [address] [facility] [address]  [address] [place] [facility] [address] [facility] [phone]

## 2022-06-30 ENCOUNTER — OFFICE VISIT (OUTPATIENT)
Dept: POSTPARTUM | Facility: CLINIC | Age: 37
End: 2022-06-30
Payer: COMMERCIAL

## 2022-06-30 DIAGNOSIS — Z71.89 ENCOUNTER FOR BREAST FEEDING COUNSELING: Primary | ICD-10-CM

## 2022-06-30 PROCEDURE — 99404 PREV MED CNSL INDIV APPRX 60: CPT | Performed by: PEDIATRICS

## 2022-06-30 RX ORDER — DOCUSATE SODIUM 100 MG/1
100 CAPSULE, LIQUID FILLED ORAL DAILY
COMMUNITY

## 2022-06-30 NOTE — PROGRESS NOTES
INITIAL BREAST FEEDING EVALUATION    Informant/Relationship: Debra    Discussion of General Lactation Issues: initial weight loss, breast feeding one time per day, but would like to exclusively breastfeed  Ruy Morales but it is painful    Infant is 4 weeks old today          History:  Fertility Problem:no  Breast changes:yes - darker only, no size changes  : yes - 37  Full term:yes - 37 weeks   labor:no  First nursing/attempt < 1 hour after birth:yes - went ok  Skin to skin following delivery:yes - about 1 hour  Breast changes after delivery:no  Rooming in (infant in room with mother with exception of procedures, eg  Circumcision: no  Blood sugar issues:no  NICU stay:yes - for tacycardia for about 1 day  Jaundice:no  Phototherapy:no  Supplement given: (list supplement and method used as well as reason(s):yes - formula Similac total care for dehydration    Past Medical History:   Diagnosis Date    Chlamydia     Chlamydia contact, treated     Herpes simplex     Papanicolaou smear 2020    neg/neg    Trichomoniasis     Urogenital trichomoniasis          Current Outpatient Medications:     docusate sodium (COLACE) 100 mg capsule, Take 100 mg by mouth daily, Disp: , Rfl:     ferrous sulfate 324 (65 Fe) mg, Take 1 tablet (324 mg total) by mouth 2 (two) times a day before meals, Disp: 60 tablet, Rfl: 2    ketoconazole (NIZORAL) 2 % shampoo, APPLY TO THE AFFECTED AREA(S) TOPICALLY TWICE WEEKLY, Disp: , Rfl:     Prenatal Vit-Fe Sulfate-FA-DHA (Ultra Prenatal + DHA) 27-0 8-200 MG CAPS, Take 1 tablet by mouth in the morning, Disp: 30 capsule, Rfl: 12    acetaminophen (TYLENOL) 325 mg tablet, Take 2 tablets (650 mg total) by mouth every 4 (four) hours as needed for mild pain (Patient not taking: Reported on 2022), Disp: 20 tablet, Rfl: 0    BD Pen Needle Kimberly 2nd Gen 32G X 4 MM MISC, use 1 PEN NEEDLE to inject MEDICATION subcutaneously twice a day or as directed (Patient not taking: Reported on 2022), Disp: , Rfl:     Blood Glucose Monitoring Suppl (OneTouch Verio Reflect) w/Device KIT, Use as directed (Patient not taking: Reported on 2022), Disp: , Rfl:     ibuprofen (MOTRIN) 600 mg tablet, Take 1 tablet (600 mg total) by mouth every 6 (six) hours (Patient not taking: Reported on 2022), Disp: 30 tablet, Rfl: 0    Insulin Pen Needle 31G X 5 MM MISC, Inject under the skin daily at bedtime Use 2 a day or as directed , Disp: 100 each, Rfl: 0    OneTouch Delica Lancets 01H MISC, 4 times a day   GDM (Patient not taking: Reported on 2022), Disp: 100 each, Rfl: 3    No Known Allergies    Social History     Substance and Sexual Activity   Drug Use Not Currently    Types: Marijuana    Comment: Admits to social use, approx once week, Stopped using with pregnancy        Social History     Interval Breastfeeding History:    Frequency of breast feedin time per day    Does mother feel breastfeeding is effective: No  Does infant appear satisfied after nursing:No  Stooling pattern normal: Yes  Urinating frequently:Yes  Using shield or shells: No    Alternative/Artificial Feedings:   Bottle: Yes, Dr Katia Chun  Cup: No  Syringe/Finger: No           Formula Type: Similac sensitive                     Amount: 2-3 ounces            Breast Milk:                      Amount: 2 ounces one time per day            Frequency last feeding at the chest was last night at the chest for 10 minutes  Elimination Problems: No      Equipment:  Nipple Shield             Type: n/a             Size: n/a             Frequency of Use: n/a  Pump            Type: Medela pump in style max flow            Frequency of Use: one time per day (tries to pump also with manual pump because it seems to get out more than the breast pump does  Shells            Type: n/a            Frequency of use: n/a    Equipment Problems: yes doesn't get as much at the manual pump    Mom:  Breast: Normal  Nipple Assessment in General: Normal: elongated/eraser, no discoloration and no damage noted  Mother's Awareness of Feeding Cues                 Recognizes: Yes                  Verbalizes: Yes  Support System: none father is involved but works frequently  History of Breastfeeding: breast fed 7year old for one day, no breastfeeding with 16year old  Changes/Stressors/Violence: denies  Concerns/Goals: breastfeed exclusively    Problems with Mom: Never breast fed or had support to breastfeed  Hand expression was ineffective prior to education  Pavan Egan says she did not get the opportunity to meet with [facility] while inpatient      Physical Exam  HENT:      Head: Normocephalic  Nose: Nose normal       Mouth/Throat:      Mouth: Mucous membranes are moist    Eyes:      Pupils: Pupils are equal, round, and reactive to light  Pulmonary:      Effort: Pulmonary effort is normal    Abdominal:      General: Abdomen is flat  Palpations: Abdomen is soft  Musculoskeletal:         General: Normal range of motion  Neurological:      Mental Status: She is alert and oriented to person, place, and time  Skin:     General: Skin is warm and dry  Capillary Refill: Capillary refill takes less than 2 seconds  Psychiatric:         Mood and Affect: Mood normal          Behavior: Behavior normal          Thought Content:  Thought content normal          Judgment: Judgment normal          Infant:  Behaviors: Alert  Color: Pink  Birth weight: 3900 g (8 lb 9 6 oz)  Current weight: 4100 g (9 lb 0 6 oz)  Transferred 20 ml's total at switch feeding    Problems with infant: gumline felt with latch assessment       General Appearance:  Alert, active, no distress                            Head:  Normocephalic, AFOF, sutures opposed                            Eyes:   Conjunctiva clear, no drainage                            Ears:   Normally placed, no anomolies                           Nose:   Septum intact, no drainage or erythema Mouth:  No lesions                   Neck:  Supple, symmetrical, trachea midline, no adenopathy; thyroid: no enlargement, symmetric, no tenderness/mass/nodules                Respiratory:  No grunting, flaring, retractions, breath sounds clear and equal           Cardiovascular:  Regular rate and rhythm  No murmur  Adequate perfusion/capillary refill  Femoral pulse present                  Abdomen:    Soft, non-tender, no masses, bowel sounds present, no HSM            Genitourinary:  Normal female genitalia, anus patent                         Spine:   No abnormalities noted       Musculoskeletal:   Full range of motion         Skin/Hair/Nails:   Skin warm, dry, and intact, no rashes or abnormal dyspigmentation or lesions               Neurologic:   No abnormal movement, tone appropriate for gestational age  Musculoskeletal: Normal except for: left arm flaccid, polydactyly     Hazelbaker Assessment for Lingual Frenulum Function    Appearance Items Function Items   Appearance of tongue when lifted  2: Round or square   Lateralization  2: Complete   Elasticity of frenulum  1: Moderately elastic   Lift of tongue  1: Only edges to mid-mouth     Length of lingual frenulum when tongue lifted  lingual frenulum length: 1: 1 cm     Extension of tongue  1: Tip over lower gum only   Attachment of lingual frenulum to tongue  2: Posterior to tip   Spread of anterior tongue  2: Complete   Attachment of lingual frenulum to inferior alveolar ridge  2: Attached to floor of mouth or well below ridge Cupping  2: Entire edge, firm cup   Ankyloglossia Grading:  Class I: mild, 12-16 mm  Class II: moderate, 8-11 mm  Class III: severe, 3-7 mm  ClassIV: complete, less than 3 mm Peristalsis  2: Complete, anterior to posterior       SCORE:    Appearance: 8 (<8=ankyloglossia)  Function: 8 (<11=ankyloglossia)  Gumline is felt on exam even when tongue is cupping   Snapback  1: Periodic          Latch:  Efficiency: Lips Flanged: Yes              Depth of latch: shallow               Audible Swallow: no              Visible Milk: Yes              Wide Open/ Asymmetrical: No              Suck Swallow Cycle: Breathing: unlabored, Coordinated: yes  Nipple Assessment after latch: Flat   Latch Problems: shallow     Position:  Infant's Ergonomics/Body               Body Alignment: No               Head Supported: Yes               Close to Mom's body/ Lifted/ Supported: No               Mom's Ergonomics/Body: No                           Supported: No                           Sitting Back: No                           Brings Baby to her breast: No  Positioning Problems: Myriam Soto was resting in Debra's lap upon initial attempt at latching  Debra reports increased comfort with latching when Myriam Soto is aligned at the breast better  She stayed on longer for the feeding than Debra ever witnessed  Milk was evident in [address] mouth when she fell asleep      Handouts:   Paced bottle feeding, Hands on pumping, Hand expression and Latch checklist    Education:  Reviewed Latch: reviewed  Reviewed Positioning for Dyad: reviewed  Reviewed Frequency/Supply & Demand: encouraged pumping after every other feeding   Feeding Plan:  1) introduce breast first for every feeding  2) to feed infant expressed breast milk after breast  3)  feed infant formula (you may do step 2 & 3 with paced bottle feeding)  4)  to pump after every other feeding at the breast for now      Reviewed Infant:Cues and varied States of Awareness  Reviewed Infant Elimination: wnl  Reviewed Alternative/Artificial Feedings: paced bottle feeding  Reviewed Mom/Breast care: hand expression  Reviewed Equipment: breast pump cycling      Plan:  Attaching Your Baby at the [address]   This is a video you can refer to when you are latching Myriam Soto at home to remind yourself of ways to hold her that help her access milk at the breast and stay at the breast longer  Demonstrated asymmetric latching techniques with you and Jaymie  Your nipples measure a 22 to 24 mm diameter  As you say the flange you have lets too much breast tissue into the pump, you may try a 22 mm, but if it looks too tight as discussed, go back to the 24 mm  You may also try flexible silicon flanges  Or maymom flange inserts  Pumpin Pal Breast Pumping Accessories - Innovative breast pumping accessories to make pumping more comfortable and enjoyable  Discussed cycling breast pump, staying in stimulative mode until Milk Ejection Reflex then switching to the slower, expressive mode  Repeating two to three time in a pumping session after every other feeding  Jaymie transferred 20 ml's from breastfeeding via switch feeding (finishing on the same breast she started feeding on)  You may follow up feedings with 1 5 to 2 ounces of expressed breast milk as it is available or with formula as you have been  Pump after every other feeding  We demonstrated paced bottle feeding  Continue with mothers' milk tea as desired  Next visit, if you're interested, you can discuss using a supplemental nursing system as you continue to transition to exclusive breast feeding  I have spent 90 minutes with Patient  today in which greater than 50% of this time was spent in counseling/coordination of care regarding Patient and family education

## 2022-07-03 NOTE — PROGRESS NOTES
I have reviewed the notes, assessments, and/or procedures performed by Amalia Norton RN, IBCLC, I concur with her/his documentation of Loli Diaz MD 07/03/22

## 2022-07-14 ENCOUNTER — POSTPARTUM VISIT (OUTPATIENT)
Dept: OBGYN CLINIC | Facility: CLINIC | Age: 37
End: 2022-07-14

## 2022-07-14 VITALS
HEIGHT: 64 IN | BODY MASS INDEX: 28.68 KG/M2 | WEIGHT: 168 LBS | SYSTOLIC BLOOD PRESSURE: 124 MMHG | DIASTOLIC BLOOD PRESSURE: 84 MMHG

## 2022-07-14 DIAGNOSIS — R10.9 RIGHT FLANK PAIN: ICD-10-CM

## 2022-07-14 DIAGNOSIS — Z30.013 ENCOUNTER FOR INITIAL PRESCRIPTION OF INJECTABLE CONTRACEPTIVE: ICD-10-CM

## 2022-07-14 PROCEDURE — 99024 POSTOP FOLLOW-UP VISIT: CPT | Performed by: OBSTETRICS & GYNECOLOGY

## 2022-07-14 RX ORDER — FERROUS SULFATE 325(65) MG
1 TABLET ORAL
COMMUNITY
Start: 2022-06-05

## 2022-07-14 RX ORDER — MEDROXYPROGESTERONE ACETATE 150 MG/ML
150 INJECTION, SUSPENSION INTRAMUSCULAR ONCE
Status: DISCONTINUED | OUTPATIENT
Start: 2022-07-14 | End: 2022-07-15

## 2022-07-14 RX ORDER — PRENATAL WITH FERROUS FUM AND FOLIC ACID 3080; 920; 120; 400; 22; 1.84; 3; 20; 10; 1; 12; 200; 27; 25; 2 [IU]/1; [IU]/1; MG/1; [IU]/1; MG/1; MG/1; MG/1; MG/1; MG/1; MG/1; UG/1; MG/1; MG/1; MG/1; MG/1
1 TABLET ORAL EVERY MORNING
COMMUNITY
Start: 2022-06-08

## 2022-07-14 NOTE — PROGRESS NOTES
Routine Post Partum Visit  [address] [place] [address], [address], [facility], [address] [facility]    Assessment/Plan:  Winter Pastrana is a 31y o  year old [id number] who presents for postpartum visit  Patient states she has right sided back pain that is worse if she sleeps on her right side  Pain becomes worse if patient tries to twist her back  Denies having any fever or chills  Patient desires Rx for Depo-Provera for contraception, declines any other contraception options    Routine Postpartum Care  1  Normal postpartum exam  2  Contraception: Depo-Provera injections  3  Depression Screen: PPD screen score: 3; negative  4  Feeding:  She is pumping exclusively  5  Psychosocial support: present  6  Cervical cancer screening Up to Date, next due 2/2025  7  Follow up in: 3 months for wellness visit, or as needed  Additional Problems:  1  Postpartum exam    2  Right flank pain      Advised patient to follow up with PCP for evaluation  3  Encounter for initial prescription of injectable contraceptive  -  Pt to RTO for I M administration of Depo-Provera  -     medroxyPROGESTERone (DEPO-PROVERA) IM injection 150 mg    Next visit: 3 months Wellness    Subjective:     CC: Postpartum visit    Jason Walker is a 31 y o  y o  female [id number] who presents for a postpartum visit  She is 6 weeks postpartum following a spontaneous vaginal delivery on 6/3/2022 at 37 6 weeks  Outcome: spontaneous vaginal delivery, none  Anesthesia: epidural  Postpartum course has been complicated by elevated blood pressure/pre-eclampsiaand readmission to [address] [place] [address]'s  Baby's course has been complicated by physical therapy for left arm  Baby is feeding by She is pumping exclusively        Bleeding no bleeding  Bowel function is normal  Bladder function is normal  Patient is not sexually active since delivery  Contraception method is Depo-Provera injections  Postpartum depression screening: negative      The following portions of the patient's history were reviewed and updated as appropriate: allergies, current medications, past family history, past medical history, obstetric history, gynecologic history, past social history, past surgical history and problem list       Objective:  /84 (BP Location: Left arm, Patient Position: Sitting, Cuff Size: Standard)   Ht 5' 4" (1 626 m)   Wt 76 2 kg (168 lb)   LMP 09/11/2021 (Exact Date)   BMI 28 84 kg/m²   Pregravid Weight/BMI: 80 7 kg (178 lb) (BMI 30 54)  Current Weight: 76 2 kg (168 lb)   Total Weight Gain: 4 082 kg (9 lb)     General: Well appearing, no distress  Mood and affect: Appropriate    Abdomen: Soft, nontender  Vulva: normal  Vagina: normal, no abnormal discharge  Cervix: closed, no bleeding  Uterus: non-tender, normal size  Adnexa: no masses, no tenderness

## 2022-07-15 ENCOUNTER — TELEPHONE (OUTPATIENT)
Dept: OBGYN CLINIC | Facility: CLINIC | Age: 37
End: 2022-07-15

## 2022-07-15 RX ORDER — MEDROXYPROGESTERONE ACETATE 150 MG/ML
150 INJECTION, SUSPENSION INTRAMUSCULAR
Qty: 1 ML | Refills: 0 | Status: SHIPPED | OUTPATIENT
Start: 2022-07-15 | End: 2022-10-17

## 2022-07-15 NOTE — TELEPHONE ENCOUNTER
Adam groves informed her rx sent visit made for Monday at 10am    Dr Prema Blas confirmed that the patient can have the depo on the nurses schedule while on the phone with her helping to order the prescription 
Patient called stating that [facility] had not receive her script for the Depo Provera yet  She request c/b once script sent  Dr Abilio Nicholson please resent the script to [facility] 
Rx sent again
Attending Attestation (For Attendings USE Only)...

## 2022-07-18 ENCOUNTER — CLINICAL SUPPORT (OUTPATIENT)
Dept: OBGYN CLINIC | Facility: CLINIC | Age: 37
End: 2022-07-18
Payer: COMMERCIAL

## 2022-07-18 VITALS
SYSTOLIC BLOOD PRESSURE: 124 MMHG | WEIGHT: 168 LBS | BODY MASS INDEX: 28.68 KG/M2 | DIASTOLIC BLOOD PRESSURE: 84 MMHG | HEIGHT: 64 IN

## 2022-07-18 DIAGNOSIS — Z30.42 ENCOUNTER FOR DEPO-PROVERA CONTRACEPTION: Primary | ICD-10-CM

## 2022-07-18 LAB — SL AMB POCT URINE HCG: NEGATIVE

## 2022-07-18 PROCEDURE — 96372 THER/PROPH/DIAG INJ SC/IM: CPT | Performed by: OBSTETRICS & GYNECOLOGY

## 2022-07-18 PROCEDURE — 81025 URINE PREGNANCY TEST: CPT

## 2022-07-18 RX ORDER — MEDROXYPROGESTERONE ACETATE 150 MG/ML
150 INJECTION, SUSPENSION INTRAMUSCULAR ONCE
Status: COMPLETED | OUTPATIENT
Start: 2022-07-18 | End: 2022-07-18

## 2022-07-18 RX ADMIN — MEDROXYPROGESTERONE ACETATE 150 MG: 150 INJECTION, SUSPENSION INTRAMUSCULAR at 10:59

## 2022-07-18 NOTE — PROGRESS NOTES
Cal Wood presents to the office for her first administration of Depo  Per Dr Rowell Side is able to get her first Depo on the nurses schedule  Cal Wood had a negative Urine Pregnancy Test in the office today prior to administration of depo  Depo was administered in the Left deltoid  She tolerated the injection well without any complications  She wishes to continue with her depo at this time  Advised it may take her body some time to get adjusted to the depo so she may have some spotting, or break through bleedings  Advised if sexually active utilize back up protection for the first two weeks

## 2022-10-16 NOTE — PATIENT INSTRUCTIONS
Pap every 5 years if normal, sexually transmitted infection testing as indicated, exercise most days of week, obtain appropriate diet and hydration, Calcium 1000mg + 600 vit D daily, birth control as directed (ACHES reviewed)  Benefits, risks and alternatives discussed/reviewed  HPV 9 vaccine recommended through age 39  Check with your insurance for coverage  If covered, call office to schedule start of vaccine series  Annual mammogram starting at age 36, monthly breast self exam  Lisseth Quiroz 20 times twice daily

## 2022-10-16 NOTE — PROGRESS NOTES
[address], [address], [facility] [address] [facility]    ASSESSMENT/PLAN: Simone Roca is a 31 y o  [id number] who presents for annual gynecologic exam     Encounter for routine gynecologic examination  - Routine well woman exam completed today  - HPV Vaccination status: Not immunized  - STI screening offered including HIV testing: culture done today per  Request   - Contraceptive counseling discussed  Current contraception   Depo provera       Additional problems addressed during this visit:  1  Routine gynecological examination    2  Encounter for Depo-Provera contraception    3  Cervical cancer screening  -     IGP, CtNg, rfx Aptima HPV ASCU    4  History of gestational diabetes    5  HSV (herpes simplex virus) anogenital infection        CC:  Annual Gynecologic Examination    HPI: Morgan Chacon is a 31 y o  [id number] who presents for annual gynecologic examination  32 yo  here for wellness exam   BCM  Depo provera   No bleeding had period w   Initial  Depo for  2   Weeks    The following portions of the patient's history were reviewed and updated as appropriate: She  has a past medical history of Chlamydia, Chlamydia contact, treated, Herpes simplex, Papanicolaou smear (2020), Trichomoniasis, and Urogenital trichomoniasis  She  has a past surgical history that includes  section ()  Her family history includes Diabetes in her mother; Hypertension in her mother and sister; Thyroid disease unspecified in her paternal grandmother  She  reports that she has never smoked  She has never used smokeless tobacco  She reports previous alcohol use  She reports previous drug use  Drug: Marijuana    Current Outpatient Medications   Medication Sig Dispense Refill   • docusate sodium (COLACE) 100 mg capsule Take 100 mg by mouth daily     • ferrous sulfate 324 (65 Fe) mg Take 1 tablet (324 mg total) by mouth 2 (two) times a day before meals 60 tablet 2   • ketoconazole (NIZORAL) 2 % shampoo APPLY TO THE AFFECTED AREA(S) TOPICALLY TWICE WEEKLY     • FeroSul 325 (65 Fe) MG tablet Take 1 tablet by mouth 2 (two) times a day before meals     • Insulin Pen Needle 31G X 5 MM MISC Inject under the skin daily at bedtime Use 2 a day or as directed  100 each 0   • Prenatal 27-1 MG TABS Take 1 tablet by mouth every morning       No current facility-administered medications for this visit  She has No Known Allergies       Review of Systems   Constitutional: Negative for chills and fever  HENT: Negative for ear pain and sore throat  Eyes: Negative for pain and visual disturbance  Respiratory: Negative for cough and shortness of breath  Cardiovascular: Negative for chest pain and palpitations  Gastrointestinal: Negative for abdominal pain and vomiting  Genitourinary: Negative for dysuria and hematuria  Musculoskeletal: Negative for arthralgias and back pain  Skin: Negative for color change and rash  Neurological: Negative for seizures and syncope  Hematological: Negative  Psychiatric/Behavioral: Negative  All other systems reviewed and are negative  Objective:  /80 (BP Location: Left arm, Patient Position: Sitting, Cuff Size: Standard)   Ht 5' 4" (1 626 m)   Wt 88 kg (194 lb)   LMP 07/18/2022   Breastfeeding Unknown   BMI 33 30 kg/m²    Physical Exam  Vitals and nursing note reviewed  Constitutional:       Appearance: Normal appearance  HENT:      Head: Normocephalic  Cardiovascular:      Rate and Rhythm: Normal rate and regular rhythm  Pulses: Normal pulses  Heart sounds: Normal heart sounds  Pulmonary:      Effort: Pulmonary effort is normal       Breath sounds: Normal breath sounds  Chest:      Chest wall: No mass, lacerations, swelling, tenderness or edema  Breasts: Dionicio Score is 4   Breasts are symmetrical       Right: Normal  No swelling, bleeding, inverted nipple, mass, nipple discharge, skin change, tenderness, axillary adenopathy or supraclavicular adenopathy  Left: No swelling, bleeding, inverted nipple, mass, nipple discharge, skin change, tenderness, axillary adenopathy or supraclavicular adenopathy  Abdominal:      General: Abdomen is flat  Bowel sounds are normal       Palpations: Abdomen is soft  Genitourinary:     General: Normal vulva  Exam position: Lithotomy position  Pubic Area: No rash  Dionicio stage (genital): 4       Labia:         Right: No rash, tenderness or lesion  Left: No rash, tenderness or lesion  Urethra: No urethral pain, urethral swelling or urethral lesion  Vagina: Normal       Cervix: No cervical motion tenderness or discharge  Uterus: Normal        Adnexa: Right adnexa normal and left adnexa normal       Rectum: Normal    Musculoskeletal:         General: Normal range of motion  Cervical back: Neck supple  Lymphadenopathy:      Upper Body:      Right upper body: No supraclavicular, axillary or pectoral adenopathy  Left upper body: No supraclavicular, axillary or pectoral adenopathy  Lower Body: No right inguinal adenopathy  No left inguinal adenopathy  Skin:     General: Skin is warm and dry  Neurological:      General: No focal deficit present  Mental Status: She is alert and oriented to person, place, and time  Psychiatric:         Mood and Affect: Mood normal          Behavior: Behavior normal          Thought Content:  Thought content normal          Judgment: Judgment normal

## 2022-10-17 ENCOUNTER — CLINICAL SUPPORT (OUTPATIENT)
Dept: OBGYN CLINIC | Facility: CLINIC | Age: 37
End: 2022-10-17

## 2022-10-17 ENCOUNTER — ANNUAL EXAM (OUTPATIENT)
Dept: OBGYN CLINIC | Facility: CLINIC | Age: 37
End: 2022-10-17

## 2022-10-17 VITALS
SYSTOLIC BLOOD PRESSURE: 130 MMHG | HEIGHT: 64 IN | WEIGHT: 194 LBS | BODY MASS INDEX: 33.12 KG/M2 | DIASTOLIC BLOOD PRESSURE: 80 MMHG

## 2022-10-17 VITALS
BODY MASS INDEX: 33.12 KG/M2 | WEIGHT: 194 LBS | DIASTOLIC BLOOD PRESSURE: 80 MMHG | SYSTOLIC BLOOD PRESSURE: 130 MMHG | HEIGHT: 64 IN

## 2022-10-17 DIAGNOSIS — A60.9 HSV (HERPES SIMPLEX VIRUS) ANOGENITAL INFECTION: ICD-10-CM

## 2022-10-17 DIAGNOSIS — Z86.32 HISTORY OF GESTATIONAL DIABETES: ICD-10-CM

## 2022-10-17 DIAGNOSIS — Z30.42 ENCOUNTER FOR DEPO-PROVERA CONTRACEPTION: ICD-10-CM

## 2022-10-17 DIAGNOSIS — Z78.9 USES DEPO-PROVERA AS PRIMARY BIRTH CONTROL METHOD: ICD-10-CM

## 2022-10-17 DIAGNOSIS — Z30.42 ENCOUNTER FOR MANAGEMENT AND INJECTION OF DEPO-PROVERA: Primary | ICD-10-CM

## 2022-10-17 DIAGNOSIS — Z01.419 ROUTINE GYNECOLOGICAL EXAMINATION: Primary | ICD-10-CM

## 2022-10-17 DIAGNOSIS — Z12.4 CERVICAL CANCER SCREENING: ICD-10-CM

## 2022-10-17 RX ORDER — MEDROXYPROGESTERONE ACETATE 150 MG/ML
150 INJECTION, SUSPENSION INTRAMUSCULAR ONCE
Status: COMPLETED | OUTPATIENT
Start: 2022-10-17 | End: 2022-10-17

## 2022-10-17 RX ORDER — MEDROXYPROGESTERONE ACETATE 150 MG/ML
150 INJECTION, SUSPENSION INTRAMUSCULAR
Qty: 1 ML | Refills: 4 | Status: SHIPPED | OUTPATIENT
Start: 2022-10-17 | End: 2023-10-17

## 2022-10-17 RX ADMIN — MEDROXYPROGESTERONE ACETATE 150 MG: 150 INJECTION, SUSPENSION INTRAMUSCULAR at 14:02

## 2022-10-17 NOTE — PROGRESS NOTES
Depo Provera administered in the left deltoid without difficulty  She was just seen this morning for a WA with Damaris and did not have her Depo vial as she was due for a new script and today was a last day in the time frame to get her injection  Pt was able to  her script and come straight her for the nurse to administer the injection  Pt aware to return in 3 months for next injection

## 2022-10-22 LAB
C TRACH RRNA CVX QL NAA+PROBE: NEGATIVE
CYTOLOGIST CVX/VAG CYTO: NORMAL
DX ICD CODE: NORMAL
N GONORRHOEA RRNA CVX QL NAA+PROBE: NEGATIVE
OTHER STN SPEC: NORMAL
OTHER STN SPEC: NORMAL
PATH REPORT.FINAL DX SPEC: NORMAL
SL AMB NOTE:: NORMAL
SL AMB SPECIMEN ADEQUACY: NORMAL
SL AMB TEST METHODOLOGY: NORMAL

## 2022-11-18 NOTE — PROGRESS NOTES
A fetal ultrasound and NST were completed  See Ob procedures in Epic for an interpretation and recommendations  Do not hesitate to contact us in [facility] if you have questions  Koffi Multani MD, [address] [facility]  Maternal Fetal Medicine Statement Selected

## 2023-01-05 ENCOUNTER — CLINICAL SUPPORT (OUTPATIENT)
Dept: OBGYN CLINIC | Facility: CLINIC | Age: 38
End: 2023-01-05

## 2023-01-05 DIAGNOSIS — Z30.42 ENCOUNTER FOR DEPO-PROVERA CONTRACEPTION: Primary | ICD-10-CM

## 2023-01-05 RX ORDER — MEDROXYPROGESTERONE ACETATE 150 MG/ML
150 INJECTION, SUSPENSION INTRAMUSCULAR ONCE
Status: COMPLETED | OUTPATIENT
Start: 2023-01-05 | End: 2023-01-05

## 2023-01-05 RX ADMIN — MEDROXYPROGESTERONE ACETATE 150 MG: 150 INJECTION, SUSPENSION INTRAMUSCULAR at 09:12

## 2023-02-22 ENCOUNTER — TELEPHONE (OUTPATIENT)
Dept: OBGYN CLINIC | Facility: CLINIC | Age: 38
End: 2023-02-22

## 2023-02-22 NOTE — TELEPHONE ENCOUNTER
Debra left a message c/o bleeding x one month on Depo  She also started new medication & doesn't know if this affecting Depo? Left message on Debra's voice mail to call back

## 2023-02-22 NOTE — TELEPHONE ENCOUNTER
Pt called back informing she is currently on Depo-provera therapy  Reports she has had a light bleeding flow for the past month, changing a regular size tampon 3-4 times a day  Started a new OTC medication for weight loss called CLA-Abcuts (weight loss pill)  and wondering if this is affecting her Depo? Last Depo-provera given 1/5/23, not due for next Depo injection until 3/23/23  Pt denies being sexually active since last  Depo-provera given  Recommended to check HPT and will forward to STEVE BARBER  To review and advise

## 2023-02-23 NOTE — TELEPHONE ENCOUNTER
I would  do a  home pregnancy test  if negative then would start  Motrin  600 mg every 6 hours while awake    I highly encourage her to stop the supplement

## 2023-02-23 NOTE — TELEPHONE ENCOUNTER
Spoke with patient and she informed her HPT was negative, provided recommendation to start Motrin  600 mg every 6 hours while awake  and highly encouraged to stop the supplement  If bleeding persist, call back to further address

## 2023-03-13 ENCOUNTER — TELEPHONE (OUTPATIENT)
Dept: OBGYN CLINIC | Facility: CLINIC | Age: 38
End: 2023-03-13

## 2023-03-13 ENCOUNTER — OFFICE VISIT (OUTPATIENT)
Dept: OBGYN CLINIC | Facility: CLINIC | Age: 38
End: 2023-03-13

## 2023-03-13 VITALS
WEIGHT: 184 LBS | DIASTOLIC BLOOD PRESSURE: 80 MMHG | HEIGHT: 64 IN | BODY MASS INDEX: 31.41 KG/M2 | SYSTOLIC BLOOD PRESSURE: 124 MMHG

## 2023-03-13 DIAGNOSIS — N76.0 BV (BACTERIAL VAGINOSIS): ICD-10-CM

## 2023-03-13 DIAGNOSIS — Z78.9 USES DEPO-PROVERA AS PRIMARY BIRTH CONTROL METHOD: ICD-10-CM

## 2023-03-13 DIAGNOSIS — R58 BLEEDING: Primary | ICD-10-CM

## 2023-03-13 DIAGNOSIS — B96.89 BV (BACTERIAL VAGINOSIS): ICD-10-CM

## 2023-03-13 DIAGNOSIS — Z11.3 SCREEN FOR STD (SEXUALLY TRANSMITTED DISEASE): ICD-10-CM

## 2023-03-13 DIAGNOSIS — Z32.00 ENCOUNTER FOR PREGNANCY TEST, RESULT UNKNOWN: ICD-10-CM

## 2023-03-13 DIAGNOSIS — A60.9 HSV (HERPES SIMPLEX VIRUS) ANOGENITAL INFECTION: ICD-10-CM

## 2023-03-13 RX ORDER — MEDROXYPROGESTERONE ACETATE 150 MG/ML
INJECTION, SUSPENSION INTRAMUSCULAR
Qty: 1 ML | Refills: 3 | Status: SHIPPED | OUTPATIENT
Start: 2023-03-13

## 2023-03-13 NOTE — PATIENT INSTRUCTIONS
Abnormal (Dysfunctional) Uterine Bleeding   WHAT YOU NEED TO KNOW:   Abnormal uterine bleeding (AUB) is uterine bleeding that is not usual for you  It may also be called dysfunctional uterine bleeding  You may have bleeding from your uterus at times other than your normal monthly period  Your monthly periods may last longer or shorter, and bleeding may be heavier or lighter than usual  AUB can be acute (lasting a short time) or chronic (lasting longer than 6 months)  DISCHARGE INSTRUCTIONS:   Return to the emergency department if:   You continue to bleed heavily, or you feel faint  Call your doctor or gynecologist if:   You need to change your sanitary pad or tampon more than 1 time each hour  Your medicine causes nausea, vomiting, or diarrhea  You have questions or concerns about your condition or care  Medicines: You may need any of the following:  Hormones  help decrease bleeding by making your monthly periods more regular  Sometimes this medicine may be given as birth control pills  Iron supplements  may be given if your blood iron level decreases because of heavy bleeding  Iron may make you constipated  Ask your healthcare provider for ways to prevent or treat constipation  Iron may also make your bowel movements turn dark or black  Take your medicine as directed  Contact your healthcare provider if you think your medicine is not helping or if you have side effects  Tell your provider if you are allergic to any medicine  Keep a list of the medicines, vitamins, and herbs you take  Include the amounts, and when and why you take them  Bring the list or the pill bottles to follow-up visits  Carry your medicine list with you in case of an emergency  Self-care:   Apply heat on your lower abdomen to decrease pain and muscle spasms  Apply heat for 20 to 30 minutes every 2 hours for as many days as directed  Include foods high in iron if needed    Examples of foods high in iron are leafy green vegetables, beef, pork, liver, eggs, and whole-grain breads and cereals  Keep a diary of your menstrual cycles  Keep track of the number of tampons or pads you use each day  Talk to your healthcare provider before you start a weight loss program   You may need to wait until the abnormal bleeding has stopped before you try to lose weight  The amount of iron in your blood should be normal before you lose weight  Ask your provider if weight loss will help your AUB  He or she can tell you what weight is healthy for you  He or she can help you create a safe weight loss plan, if needed  Follow up with your doctor or gynecologist as directed: You may need to return in 4 to 6 months so your provider knows if the AUB has stopped  Bring the diary of your menstrual cycles to your follow-up visits  Write down your questions so you remember to ask them during your visits  © Copyright Kam[employer] 2022 Information is for End User's use only and may not be sold, redistributed or otherwise used for commercial purposes  The above information is an  only  It is not intended as medical advice for individual conditions or treatments  Talk to your doctor, nurse or pharmacist before following any medical regimen to see if it is safe and effective for you  We will give your  depo provera  at 10 weeks

## 2023-03-13 NOTE — PROGRESS NOTES
PROBLEM GYNECOLOGICAL VISIT    Ramonia Silva Eulalia Lennox is a 30 y o  female who presents today with complaint of bleeding for  6 weeks  On Depo provera   Seen by primary on   3/13   for   Giddiness and   Dizziness,  Seen  For chest pain on   w a Hgb  Of  11 1   /72    Her general medical history has been reviewed and she reports it as follows:    Past Medical History:   Diagnosis Date   • Chlamydia    • Chlamydia contact, treated    • Herpes simplex    • Papanicolaou smear 2020    neg/neg   • Trichomoniasis    • Urogenital trichomoniasis      Past Surgical History:   Procedure Laterality Date   •  SECTION       OB History        6    Para   3    Term   3            AB   3    Living   3       SAB   1    IAB        Ectopic   1    Multiple   0    Live Births   1           Obstetric Comments   Menarche: 17           Social History     Tobacco Use   • Smoking status: Never   • Smokeless tobacco: Never   Vaping Use   • Vaping Use: Never used   Substance Use Topics   • Alcohol use: Not Currently   • Drug use: Not Currently     Types: Marijuana     Comment: Admits to social use, approx once week, Stopped using with pregnancy        Current Outpatient Medications   Medication Instructions   • docusate sodium (COLACE) 100 mg, Oral, Daily   • FeroSul 325 (65 Fe) MG tablet 1 tablet, Oral, 2 times daily before meals   • ferrous sulfate 324 mg, Oral, 2 times daily before meals   • ketoconazole (NIZORAL) 2 % shampoo APPLY TO THE AFFECTED AREA(S) TOPICALLY TWICE WEEKLY   • medroxyPROGESTERone (DEPO-PROVERA) 150 mg, Intramuscular, Every 3 months   • Prenatal 27-1 MG TABS 1 tablet, Oral, Every morning       History of Present Illness: Pt on  Depo provera  For  2  inj since   PP    2022  Co of  bleeding  For  6 weeks   Last  Depo provera was  2023 due on  3/29  For next injection  Review of Systems:  Review of Systems   Constitutional: Negative      Genitourinary: Positive for vaginal bleeding  Neurological: Negative  Hematological: Negative  Psychiatric/Behavioral: Negative  Physical Exam:  There were no vitals taken for this visit  Physical Exam  Constitutional:       Appearance: Normal appearance  She is normal weight  Genitourinary:      Vulva, bladder and rectum normal       No lesions in the vagina  Genitourinary Comments: Brown mucoid  Dc       Right Labia: No rash, tenderness, lesions or skin changes  Left Labia: No tenderness, lesions, skin changes or rash  No labial fusion noted  No inguinal adenopathy present in the right or left side  Vaginal bleeding present  No vaginal discharge, erythema or tenderness  No vaginal prolapse present  Right Adnexa: not tender, not full, not palpable and no mass present  Left Adnexa: not tender, not full, not palpable and no mass present  Cervix is not nulliparous or parous  No cervical motion tenderness, discharge, friability, lesion, polyp, nabothian cyst, eversion or elongation  No parametrium thickening present  Uterus is not enlarged, fixed, tender or prolapsed  No uterine mass detected  No urethral prolapse, tenderness, mass, hypermobility or discharge present  Pelvic floor neuro is intact  Pelvic exam was performed with patient in the lithotomy position and normal support  Cardiovascular:      Rate and Rhythm: Normal rate and regular rhythm  Pulmonary:      Effort: Pulmonary effort is normal       Breath sounds: Normal breath sounds  Abdominal:      General: Abdomen is flat  Bowel sounds are normal  There is no distension  Palpations: Abdomen is soft  There is no splenomegaly or mass  Tenderness: There is no abdominal tenderness  There is no right CVA tenderness, guarding or rebound  Hernia: There is no hernia in the left inguinal area or right inguinal area  Lymphadenopathy:      Lower Body: No right inguinal adenopathy  No left inguinal adenopathy  Neurological:      Mental Status: She is alert and oriented to person, place, and time  Skin:     General: Skin is warm and dry  Psychiatric:         Mood and Affect: Mood normal          Thought Content: Thought content normal          Judgment: Judgment normal    Vitals and nursing note reviewed  Assessment:   1  Uses  Depo provera for contraception  IMB  Plan:   Schedule depo Provera  At  10 weeks which will be this  Wed  3/16,   Motrin 600 mg every 6 hours while awake  Pregnacy  Test neg in office today         Reviewed with patient that test results are available in [facility] immediately, but that they will not necessarily be reviewed by me immediately  Explained that I will review results at my earliest opportunity and contact patient appropriately

## 2023-03-13 NOTE — TELEPHONE ENCOUNTER
Debra saw her PCP today who recommended she follow up with her gyn for continued irregular menstrual bleeding, lightheadedness and fatigue  PCP did not order labs  Previously called and followed recommendation for motrin which stopped the bleeding for a couple days  Now back to brown spotting  Last depo 1/5/2023- due after 3/23/2023  Advised brown is old blood that may be working its way out  Can continue to monitor for resolution over next day or two  Patient prefers evaluation in office due to fatigue and feeling lightheaded  States recent evaluation in [facility] ED with normal labs  Appt provided as requested for 5pm today  Advised will obtain [facility] ED records and call back if plan changes  Patient in agreement  [facility] ER reports and PCP notes printed from [address]  Scanned to chart for appointment

## 2023-03-16 ENCOUNTER — CLINICAL SUPPORT (OUTPATIENT)
Dept: OBGYN CLINIC | Facility: CLINIC | Age: 38
End: 2023-03-16

## 2023-03-16 VITALS
WEIGHT: 194 LBS | DIASTOLIC BLOOD PRESSURE: 74 MMHG | HEIGHT: 64 IN | BODY MASS INDEX: 33.12 KG/M2 | SYSTOLIC BLOOD PRESSURE: 116 MMHG

## 2023-03-16 DIAGNOSIS — Z30.42 ENCOUNTER FOR MANAGEMENT AND INJECTION OF DEPO-PROVERA: Primary | ICD-10-CM

## 2023-03-16 LAB
C TRACH RRNA SPEC QL NAA+PROBE: NEGATIVE
N GONORRHOEA RRNA SPEC QL NAA+PROBE: NEGATIVE

## 2023-03-16 RX ORDER — MEDROXYPROGESTERONE ACETATE 150 MG/ML
150 INJECTION, SUSPENSION INTRAMUSCULAR ONCE
Status: COMPLETED | OUTPATIENT
Start: 2023-03-16 | End: 2023-03-16

## 2023-03-16 RX ADMIN — MEDROXYPROGESTERONE ACETATE 150 MG: 150 INJECTION, SUSPENSION INTRAMUSCULAR at 11:10

## 2023-03-16 NOTE — PROGRESS NOTES
Here for depo at 10 weeks per [facility]  No changes since office appt on Monday  Depo administered right deltoid  Patient tolerated well    Lot#[id number]  Exp: 01/2025  NDC: 3938-3862-50

## 2023-05-12 PROBLEM — Z11.3 SCREEN FOR STD (SEXUALLY TRANSMITTED DISEASE): Status: RESOLVED | Noted: 2023-03-13 | Resolved: 2023-05-12

## 2023-05-26 ENCOUNTER — CLINICAL SUPPORT (OUTPATIENT)
Dept: OBGYN CLINIC | Facility: CLINIC | Age: 38
End: 2023-05-26

## 2023-05-26 VITALS — BODY MASS INDEX: 31.93 KG/M2 | DIASTOLIC BLOOD PRESSURE: 78 MMHG | WEIGHT: 186 LBS | SYSTOLIC BLOOD PRESSURE: 124 MMHG

## 2023-05-26 DIAGNOSIS — Z30.42 ENCOUNTER FOR MANAGEMENT AND INJECTION OF DEPO-PROVERA: Primary | ICD-10-CM

## 2023-05-26 RX ORDER — MEDROXYPROGESTERONE ACETATE 150 MG/ML
150 INJECTION, SUSPENSION INTRAMUSCULAR ONCE
Status: COMPLETED | OUTPATIENT
Start: 2023-05-26 | End: 2023-05-26

## 2023-05-26 RX ADMIN — MEDROXYPROGESTERONE ACETATE 150 MG: 150 INJECTION, SUSPENSION INTRAMUSCULAR at 10:23

## 2023-05-26 NOTE — PROGRESS NOTES
Depo injection administered w/o complications, patient wishes to continue injection and denies any other issues  Patient advised to schedule to return in 10 weeks for next injection  Patient verbalized understanding

## 2023-08-01 ENCOUNTER — TELEPHONE (OUTPATIENT)
Dept: OBGYN CLINIC | Facility: CLINIC | Age: 38
End: 2023-08-01

## 2023-08-01 NOTE — TELEPHONE ENCOUNTER
Received faxed Approval letter from [facility] for Depo-Provera, to given every 10 weeks. Approved time is 08/01/2023 to 08/01/2024. Informed Debra of medication approval. Also left approval message on [facility] pharmacy prescribe's voice mail. Will have approval letter scanned into pt chart.

## 2023-08-01 NOTE — TELEPHONE ENCOUNTER
Debra was unable to  Depo-Provera Rx at [facility] pharmacy,August stating too soon. She has a Depo-Provera injection appt scheduled for 08/04. Debra receives Depo-Provera injection every 10 weeks. Spoke with [facility] Pharmacist whom states, [employer] will only cover Depo-Provera every 13 weeks. Pharmacist said would need to do Prior Authorization for every 10 week dispense. (He provided [employer] [facility] ph# [phone])       This nurse started prior authorization process on [employer];Key: [id number]. May receive response in 5 days. Informed Debra of prior authorization process.

## 2023-08-04 ENCOUNTER — CLINICAL SUPPORT (OUTPATIENT)
Dept: OBGYN CLINIC | Facility: CLINIC | Age: 38
End: 2023-08-04
Payer: COMMERCIAL

## 2023-08-04 VITALS — SYSTOLIC BLOOD PRESSURE: 118 MMHG | BODY MASS INDEX: 32.79 KG/M2 | WEIGHT: 191 LBS | DIASTOLIC BLOOD PRESSURE: 80 MMHG

## 2023-08-04 DIAGNOSIS — Z30.42 ENCOUNTER FOR MANAGEMENT AND INJECTION OF DEPO-PROVERA: Primary | ICD-10-CM

## 2023-08-04 PROCEDURE — 96372 THER/PROPH/DIAG INJ SC/IM: CPT

## 2023-08-04 RX ORDER — MEDROXYPROGESTERONE ACETATE 150 MG/ML
150 INJECTION, SUSPENSION INTRAMUSCULAR ONCE
Status: COMPLETED | OUTPATIENT
Start: 2023-08-04 | End: 2023-08-04

## 2023-08-04 RX ADMIN — MEDROXYPROGESTERONE ACETATE 150 MG: 150 INJECTION, SUSPENSION INTRAMUSCULAR at 13:23

## 2023-08-04 NOTE — PROGRESS NOTES
Pt present for Depo-provera injection. Pt reports light spotting this week prior to appointment. Denies any heavy bleeding. Pt receiving Depo-provera every 10 weeks. Pt desires to continue on Depo-provera therapy.  Pt aware to schedule next injection 10 weeks,

## 2023-10-13 ENCOUNTER — CLINICAL SUPPORT (OUTPATIENT)
Dept: OBGYN CLINIC | Facility: CLINIC | Age: 38
End: 2023-10-13
Payer: COMMERCIAL

## 2023-10-13 VITALS — WEIGHT: 190.8 LBS | SYSTOLIC BLOOD PRESSURE: 114 MMHG | DIASTOLIC BLOOD PRESSURE: 78 MMHG | BODY MASS INDEX: 32.75 KG/M2

## 2023-10-13 DIAGNOSIS — Z30.42 ENCOUNTER FOR MANAGEMENT AND INJECTION OF DEPO-PROVERA: Primary | ICD-10-CM

## 2023-10-13 PROCEDURE — 96372 THER/PROPH/DIAG INJ SC/IM: CPT

## 2023-10-13 RX ORDER — MEDROXYPROGESTERONE ACETATE 150 MG/ML
150 INJECTION, SUSPENSION INTRAMUSCULAR ONCE
Status: COMPLETED | OUTPATIENT
Start: 2023-10-13 | End: 2023-10-13

## 2023-10-13 RX ADMIN — MEDROXYPROGESTERONE ACETATE 150 MG: 150 INJECTION, SUSPENSION INTRAMUSCULAR at 10:23

## 2023-10-13 NOTE — PROGRESS NOTES
Patient presents for a depo provera injection today. Patient denies any btb or issues with injection and wishes to continue it. Injection given without any complications. Patient aware to schedule next injection in 10 weeks. Patient also aware that she is due for annual appointment.

## 2023-12-17 DIAGNOSIS — R58 BLEEDING: ICD-10-CM

## 2023-12-17 RX ORDER — MEDROXYPROGESTERONE ACETATE 150 MG/ML
INJECTION, SUSPENSION INTRAMUSCULAR
Qty: 1 ML | Refills: 3 | Status: SHIPPED | OUTPATIENT
Start: 2023-12-17

## 2023-12-22 ENCOUNTER — CLINICAL SUPPORT (OUTPATIENT)
Dept: OBGYN CLINIC | Facility: CLINIC | Age: 38
End: 2023-12-22
Payer: COMMERCIAL

## 2023-12-22 VITALS — BODY MASS INDEX: 33.13 KG/M2 | SYSTOLIC BLOOD PRESSURE: 132 MMHG | WEIGHT: 193 LBS | DIASTOLIC BLOOD PRESSURE: 86 MMHG

## 2023-12-22 DIAGNOSIS — Z30.42 ENCOUNTER FOR MANAGEMENT AND INJECTION OF DEPO-PROVERA: Primary | ICD-10-CM

## 2023-12-22 PROCEDURE — 96372 THER/PROPH/DIAG INJ SC/IM: CPT

## 2023-12-22 RX ORDER — MEDROXYPROGESTERONE ACETATE 150 MG/ML
150 INJECTION, SUSPENSION INTRAMUSCULAR ONCE
Status: COMPLETED | OUTPATIENT
Start: 2023-12-22 | End: 2023-12-22

## 2023-12-22 RX ADMIN — MEDROXYPROGESTERONE ACETATE 150 MG: 150 INJECTION, SUSPENSION INTRAMUSCULAR at 11:00

## 2023-12-22 NOTE — PROGRESS NOTES
Patient presents for depo provera injection today.  She denies any btb at this time.  Injection given without any complications.  Patient wishes to continue injection and is aware to schedule within the next 10 weeks.

## 2024-03-04 ENCOUNTER — CLINICAL SUPPORT (OUTPATIENT)
Dept: OBGYN CLINIC | Facility: CLINIC | Age: 39
End: 2024-03-04
Payer: COMMERCIAL

## 2024-03-04 VITALS
HEIGHT: 64 IN | WEIGHT: 193 LBS | DIASTOLIC BLOOD PRESSURE: 72 MMHG | BODY MASS INDEX: 32.95 KG/M2 | SYSTOLIC BLOOD PRESSURE: 116 MMHG

## 2024-03-04 DIAGNOSIS — Z30.42 ENCOUNTER FOR MANAGEMENT AND INJECTION OF DEPO-PROVERA: Primary | ICD-10-CM

## 2024-03-04 PROCEDURE — 96372 THER/PROPH/DIAG INJ SC/IM: CPT

## 2024-03-04 RX ORDER — MEDROXYPROGESTERONE ACETATE 150 MG/ML
150 INJECTION, SUSPENSION INTRAMUSCULAR ONCE
Status: COMPLETED | OUTPATIENT
Start: 2024-03-04 | End: 2024-03-04

## 2024-03-04 RX ORDER — NAPROXEN 500 MG/1
TABLET ORAL
COMMUNITY
Start: 2024-02-19

## 2024-03-04 RX ADMIN — MEDROXYPROGESTERONE ACETATE 150 MG: 150 INJECTION, SUSPENSION INTRAMUSCULAR at 11:22

## 2024-03-04 NOTE — PROGRESS NOTES
Pt here for Depo injection- pt tolerated well, wishes to continue. Pt aware to make next Depo appt.

## 2024-05-01 ENCOUNTER — TELEPHONE (OUTPATIENT)
Age: 39
End: 2024-05-01

## 2024-05-02 DIAGNOSIS — A60.9 HSV (HERPES SIMPLEX VIRUS) ANOGENITAL INFECTION: Primary | ICD-10-CM

## 2024-05-02 RX ORDER — VALACYCLOVIR HYDROCHLORIDE 500 MG/1
500 TABLET, FILM COATED ORAL 2 TIMES DAILY
Qty: 6 TABLET | Refills: 0 | Status: SHIPPED | OUTPATIENT
Start: 2024-05-02 | End: 2024-05-05

## 2024-05-14 ENCOUNTER — CLINICAL SUPPORT (OUTPATIENT)
Dept: OBGYN CLINIC | Facility: CLINIC | Age: 39
End: 2024-05-14
Payer: COMMERCIAL

## 2024-05-14 VITALS
SYSTOLIC BLOOD PRESSURE: 112 MMHG | DIASTOLIC BLOOD PRESSURE: 72 MMHG | BODY MASS INDEX: 33.29 KG/M2 | HEIGHT: 64 IN | WEIGHT: 195 LBS

## 2024-05-14 DIAGNOSIS — Z30.42 ENCOUNTER FOR MANAGEMENT AND INJECTION OF INJECTABLE PROGESTIN CONTRACEPTIVE: Primary | ICD-10-CM

## 2024-05-14 PROCEDURE — 96372 THER/PROPH/DIAG INJ SC/IM: CPT

## 2024-05-14 RX ORDER — MEDROXYPROGESTERONE ACETATE 150 MG/ML
150 INJECTION, SUSPENSION INTRAMUSCULAR ONCE
Status: COMPLETED | OUTPATIENT
Start: 2024-05-14 | End: 2024-05-14

## 2024-05-14 RX ADMIN — MEDROXYPROGESTERONE ACETATE 150 MG: 150 INJECTION, SUSPENSION INTRAMUSCULAR at 11:59

## 2024-05-14 NOTE — PROGRESS NOTES
Depo Provera injection given without complications Pt wishes to continue with Depo Provera and pt aware to schedule next visit

## 2024-08-01 ENCOUNTER — CLINICAL SUPPORT (OUTPATIENT)
Dept: OBGYN CLINIC | Facility: CLINIC | Age: 39
End: 2024-08-01
Payer: COMMERCIAL

## 2024-08-01 VITALS
SYSTOLIC BLOOD PRESSURE: 136 MMHG | BODY MASS INDEX: 33.12 KG/M2 | DIASTOLIC BLOOD PRESSURE: 72 MMHG | WEIGHT: 194 LBS | HEIGHT: 64 IN

## 2024-08-01 DIAGNOSIS — Z30.42 ENCOUNTER FOR MANAGEMENT AND INJECTION OF INJECTABLE PROGESTIN CONTRACEPTIVE: Primary | ICD-10-CM

## 2024-08-01 PROCEDURE — 96372 THER/PROPH/DIAG INJ SC/IM: CPT

## 2024-08-01 RX ORDER — MEDROXYPROGESTERONE ACETATE 150 MG/ML
150 INJECTION, SUSPENSION INTRAMUSCULAR ONCE
Status: COMPLETED | OUTPATIENT
Start: 2024-08-01 | End: 2024-08-01

## 2024-08-01 RX ADMIN — MEDROXYPROGESTERONE ACETATE 150 MG: 150 INJECTION, SUSPENSION INTRAMUSCULAR at 13:17

## 2024-08-01 NOTE — PROGRESS NOTES
Pt here for Depo injection/ pt tolerated injection well, no issues wishes to continue, pt aware to make next injection appt.

## 2024-08-19 ENCOUNTER — NURSE TRIAGE (OUTPATIENT)
Age: 39
End: 2024-08-19

## 2024-08-19 DIAGNOSIS — N89.8 VAGINAL ITCHING: ICD-10-CM

## 2024-08-19 DIAGNOSIS — B37.9 YEAST INFECTION: Primary | ICD-10-CM

## 2024-08-19 RX ORDER — FLUCONAZOLE 150 MG/1
150 TABLET ORAL DAILY
Qty: 1 TABLET | Refills: 0 | Status: SHIPPED | OUTPATIENT
Start: 2024-08-19 | End: 2024-08-20

## 2024-08-19 NOTE — TELEPHONE ENCOUNTER
Regarding: yeast infection  ----- Message from Usha PETE sent at 8/19/2024  8:06 AM EDT -----  Pt called in  having yeast infection, goes to [facility]

## 2024-08-19 NOTE — TELEPHONE ENCOUNTER
"Patient is calling in, she states she is having symptoms of a yeast infection. She reports white creamy discharge, itching and irritation that started two days ago. She denies any odor, redness or swelling. She has a hx of yeast infections if the past and has take Fluconazole in the past. Will order one dose per protocol. She can use Monistat or coconut oil externally for irritation. If she has no relief by the end of the week she is to call back to schedule an appt.     \"How many yeast infections have you had in the past 2 years?\"    -If 1 or less, prescribe Diflucan 150mg PO. If no improvement after 1 dose treatment, please instruct patient to call back to schedule appointment. (should be seen within 3 days)    -If more than 4 or more yeast infections in a year or if they are recurrent, schedule appointment within 3 days.    For OB patients: if patient wishes to use over the counter monistat, recommend only the 7 day treatment.        Reason for Disposition  • Symptoms of a vaginal yeast infection (i.e., white, thick, cottage-cheese-like, itchy, not bad smelling discharge)    Answer Assessment - Initial Assessment Questions  1. DISCHARGE: \"Describe the discharge.\" (e.g., white, yellow, green, gray, foamy, cottage cheese-like)      Creamy white  2. ODOR: \"Is there a bad odor?\"      Denies   3. ONSET: \"When did the discharge begin?\"      2 days ago   4. RASH: \"Is there a rash in that area?\" If Yes, ask: \"Describe it.\" (e.g., redness, blisters, sores, bumps)      Feels irritated, denies redness or swelling   5. ABDOMINAL PAIN: \"Are you having any abdominal pain?\" If Yes, ask: \"What does it feel like? \" (e.g., crampy, dull, intermittent, constant)       Denies   6. ABDOMINAL PAIN SEVERITY: If present, ask: \"How bad is it?\"  (e.g., mild, moderate, severe)   - MILD - doesn't interfere with normal activities    - MODERATE - interferes with normal activities or awakens from sleep    - SEVERE - patient doesn't want to move " "(R/O peritonitis)       Denies   7. CAUSE: \"What do you think is causing the discharge?\" \"Have you had the same problem before? What happened then?\"      Has had them in the past  8. OTHER SYMPTOMS: \"Do you have any other symptoms?\" (e.g., fever, itching, vaginal bleeding, pain with urination, injury to genital area, vaginal foreign body)      Itching, denies pain with urination. Feels irritated   9. PREGNANCY: \"Is there any chance you are pregnant?\" \"When was your last menstrual period?\"      On depo shot    Protocols used: Vaginal Discharge-ADULT-[place]    "

## 2024-09-13 ENCOUNTER — NURSE TRIAGE (OUTPATIENT)
Age: 39
End: 2024-09-13

## 2024-09-13 NOTE — TELEPHONE ENCOUNTER
"Debra has been noticing sharp, stabbing fleeting pain into vagina maybe once or twice  a day for the past 2 weeks. Unable to pinpoint any related activity. Denies pelvic pain, vaginal discharge, irritation.  Denies urinary or GI symptoms.     Currently on Depo, denies BTB bleeding.    Overdue for well annual. Scheduled well annual for 9/23/2024- advised to monitor fleeting intermittent pain, call back with any increased or new symptoms.   Can discuss at time of well annual.     Reason for Disposition   All other vaginal symptoms (Exception: feels like prior yeast infection, minor abrasion, mild rash < 24 hour duration, mild itching)    Answer Assessment - Initial Assessment Questions  1. SYMPTOM: \"What's the main symptom you're concerned about?\" (e.g., pain, itching, dryness)      Intermittent/ fleeting sharp stabbing pain    2. LOCATION: \"Where is the  pain located?\" (e.g., inside/outside, left/right)      Internal labia  3. ONSET: \"When did the  pain  start?\"      2 weeks ago  4. PAIN: \"Is there any pain?\" If Yes, ask: \"How bad is it?\" (Scale: 1-10; mild, moderate, severe)      Yes- moderate to severe stops in tracks  5. ITCHING: \"Is there any itching?\" If Yes, ask: \"How bad is it?\" (Scale: 1-10; mild, moderate, severe)      denies  6. CAUSE: \"What do you think is causing the discharge?\" \"Have you had the same problem before? What happened then?\"      unknown  7. OTHER SYMPTOMS: \"Do you have any other symptoms?\" (e.g., fever, itching, vaginal bleeding, pain with urination, injury to genital area, vaginal foreign body)      denies  8. PREGNANCY: \"Is there any chance you are pregnant?\" \"When was your last menstrual period?\"      Depo-no menses/denies break through bleeding    Protocols used: Vaginal Symptoms-ADULT-[place]    "

## 2024-09-23 RX ORDER — MEDROXYPROGESTERONE ACETATE 150 MG/ML
INJECTION, SUSPENSION INTRAMUSCULAR
Qty: 1 ML | Refills: 3 | Status: CANCELLED | OUTPATIENT
Start: 2024-09-23

## 2024-09-23 NOTE — PROGRESS NOTES
[facility] - [address], [address], [place] [address]    ASSESSMENT/PLAN: Debra Kurtz is a 38 y.o.  who presents for annual gynecologic exam.    Encounter for routine gynecologic examination  - Routine well woman exam completed today.  - Cervical Cancer Screening: Current ASCCP Guidelines reviewed. Last Pap: 10/17/2022 .Next Pap Due:   - STI screening offered including HIV testing:   janice dominguez done   vaginitis   panel   - Contraceptive counseling discussed.  Current contraception: condoms or Depo-Provera Inj:   - Breast Cancer Screening: Last Mammogram Not on file, due at age  42     Additional problems addressed during this visit:  1. Encounter for gynecological examination without abnormal finding  2. Bleeding  -     medroxyPROGESTERone (DEPO-PROVERA) 150 mg/mL injection; use as directed EVERY 10 WEEKS  3. Herpes simplex  4. Uses Depo-Provera as primary birth control method  5. Vaginal pain  -     US pelvis complete w transvaginal; Future  6. Screen for STD (sexually transmitted disease)  -     NuSwab Vaginitis Plus (VG+)  7. HSV (herpes simplex virus) anogenital infection  -     valACYclovir (VALTREX) 500 mg tablet; Take 1 tablet (500 mg total) by mouth 2 (two) times a day for 3 days  8. BMI 33.0-33.9,adult  9. History of       CC:  Annual Gynecologic Examination    HPI: Debra Kurtz is a 38 y.o.  who presents for annual gynecologic examination.  39 yo    here for wellness exam.  On  depo provera w  no bleeding.  + x  3 weeks stabbing vaginal pain.   Random at least  2-3 x a day.  No chg in bowels or bladder,  no bleeding,  no dc,   + some pain w  relations.   No leaking of urine.          The following portions of the patient's history were reviewed and updated as appropriate: She  has a past medical history of Chlamydia, Chlamydia contact, treated, Herpes simplex, Miscarriage (2021), Papanicolaou smear (2020), Trichomoniasis, and  Urogenital trichomoniasis.  She  has a past surgical history that includes  section () and Cholecystectomy (2023).  Her family history includes Diabetes in her mother; Hypertension in her mother and sister; Thyroid disease unspecified in her paternal grandmother.  She  reports that she has never smoked. She has never been exposed to tobacco smoke. She has never used smokeless tobacco. She reports that she does not currently use alcohol. She reports that she does not currently use drugs after having used the following drugs: Marijuana.  Current Outpatient Medications   Medication Sig Dispense Refill    clobetasol (TEMOVATE) 0.05 % ointment SCALP: Apply a pea-sized amount to affected skin on scalp twice daily for 1-2 weeks, then only 1-2 times a week as needed. Do not apply to face, genitals, or armpits.      ketoconazole (NIZORAL) 2 % shampoo APPLY TO THE AFFECTED AREA(S) TOPICALLY TWICE WEEKLY      medroxyPROGESTERone (DEPO-PROVERA) 150 mg/mL injection use as directed EVERY 10 WEEKS 1 mL 3    valACYclovir (VALTREX) 500 mg tablet Take 1 tablet (500 mg total) by mouth 2 (two) times a day for 3 days 6 tablet 4    naproxen (NAPROSYN) 500 mg tablet TAKE ONE TABLET BY MOUTH EVERY TWELVE HOURS WITH FOOD OR MILK (Patient not taking: Reported on 2024)       No current facility-administered medications for this visit.     She has No Known Allergies..    Review of Systems   Constitutional:  Negative for chills and fever.   HENT:  Negative for ear pain and sore throat.    Eyes:  Negative for pain and visual disturbance.   Respiratory:  Negative for cough and shortness of breath.    Cardiovascular:  Negative for chest pain and palpitations.   Gastrointestinal:  Negative for abdominal pain and vomiting.   Endocrine: Negative.    Genitourinary:  Positive for dyspareunia, pelvic pain and vaginal pain. Negative for difficulty urinating, dysuria, hematuria, vaginal bleeding and vaginal discharge.  "  Musculoskeletal:  Negative for arthralgias and back pain.   Skin:  Negative for color change and rash.   Allergic/Immunologic: Negative.    Neurological: Negative.  Negative for seizures and syncope.   Hematological: Negative.    Psychiatric/Behavioral: Negative.     All other systems reviewed and are negative.        Objective:  /76 (BP Location: Left arm, Patient Position: Sitting, Cuff Size: Standard)   Ht 5' 4\" (1.626 m)   Wt 88.6 kg (195 lb 6.4 oz)   BMI 33.54 kg/m²    Physical Exam  Vitals and nursing note reviewed.   Constitutional:       Appearance: Normal appearance.   HENT:      Head: Normocephalic.   Cardiovascular:      Rate and Rhythm: Normal rate and regular rhythm.      Pulses: Normal pulses.      Heart sounds: Normal heart sounds.   Pulmonary:      Effort: Pulmonary effort is normal.      Breath sounds: Normal breath sounds.   Chest:      Chest wall: No mass, lacerations, swelling, tenderness or edema.   Breasts:     Dionicio Score is 4.      Breasts are symmetrical.      Right: Normal. No swelling, bleeding, inverted nipple, mass, nipple discharge, skin change or tenderness.      Left: No swelling, bleeding, inverted nipple, mass, nipple discharge, skin change or tenderness.   Abdominal:      General: Abdomen is flat. Bowel sounds are normal.      Palpations: Abdomen is soft.   Genitourinary:     General: Normal vulva.      Exam position: Lithotomy position.      Pubic Area: No rash.       Dionicio stage (genital): 4.      Labia:         Right: No rash, tenderness or lesion.         Left: No rash, tenderness or lesion.       Urethra: No urethral pain, urethral swelling or urethral lesion.      Vagina: Normal.      Cervix: No cervical motion tenderness or discharge.      Uterus: Normal.       Adnexa: Right adnexa normal and left adnexa normal.      Rectum: Normal.   Musculoskeletal:         General: Normal range of motion.      Cervical back: Neck supple.   Lymphadenopathy:      Upper Body:    "   Right upper body: No supraclavicular, axillary or pectoral adenopathy.      Left upper body: No supraclavicular, axillary or pectoral adenopathy.      Lower Body: No right inguinal adenopathy. No left inguinal adenopathy.   Skin:     General: Skin is warm and dry.   Neurological:      General: No focal deficit present.      Mental Status: She is alert and oriented to person, place, and time.   Psychiatric:         Mood and Affect: Mood normal.         Behavior: Behavior normal.         Thought Content: Thought content normal.         Judgment: Judgment normal.

## 2024-09-24 ENCOUNTER — ANNUAL EXAM (OUTPATIENT)
Dept: OBGYN CLINIC | Facility: CLINIC | Age: 39
End: 2024-09-24
Payer: COMMERCIAL

## 2024-09-24 VITALS
BODY MASS INDEX: 33.36 KG/M2 | WEIGHT: 195.4 LBS | SYSTOLIC BLOOD PRESSURE: 128 MMHG | DIASTOLIC BLOOD PRESSURE: 76 MMHG | HEIGHT: 64 IN

## 2024-09-24 DIAGNOSIS — B00.9 HERPES SIMPLEX: ICD-10-CM

## 2024-09-24 DIAGNOSIS — R58 BLEEDING: ICD-10-CM

## 2024-09-24 DIAGNOSIS — Z01.419 ENCOUNTER FOR GYNECOLOGICAL EXAMINATION WITHOUT ABNORMAL FINDING: Primary | ICD-10-CM

## 2024-09-24 DIAGNOSIS — A60.9 HSV (HERPES SIMPLEX VIRUS) ANOGENITAL INFECTION: ICD-10-CM

## 2024-09-24 DIAGNOSIS — Z78.9 USES DEPO-PROVERA AS PRIMARY BIRTH CONTROL METHOD: ICD-10-CM

## 2024-09-24 DIAGNOSIS — Z11.3 SCREEN FOR STD (SEXUALLY TRANSMITTED DISEASE): ICD-10-CM

## 2024-09-24 DIAGNOSIS — R10.2 VAGINAL PAIN: ICD-10-CM

## 2024-09-24 DIAGNOSIS — Z98.891 HISTORY OF VBAC: ICD-10-CM

## 2024-09-24 PROBLEM — B96.89 BV (BACTERIAL VAGINOSIS): Status: RESOLVED | Noted: 2021-05-02 | Resolved: 2024-09-24

## 2024-09-24 PROBLEM — Z3A.36 36 WEEKS GESTATION OF PREGNANCY: Status: RESOLVED | Noted: 2022-04-25 | Resolved: 2024-09-24

## 2024-09-24 PROBLEM — O99.013 ANEMIA DURING PREGNANCY IN THIRD TRIMESTER: Status: RESOLVED | Noted: 2021-12-17 | Resolved: 2024-09-24

## 2024-09-24 PROBLEM — Z32.00 ENCOUNTER FOR PREGNANCY TEST, RESULT UNKNOWN: Status: RESOLVED | Noted: 2023-03-13 | Resolved: 2024-09-24

## 2024-09-24 PROBLEM — N76.0 BV (BACTERIAL VAGINOSIS): Status: RESOLVED | Noted: 2021-05-02 | Resolved: 2024-09-24

## 2024-09-24 PROBLEM — Z3A.37 37 WEEKS GESTATION OF PREGNANCY: Status: RESOLVED | Noted: 2022-06-05 | Resolved: 2024-09-24

## 2024-09-24 PROBLEM — O99.810 HYPERGLYCEMIA IN PREGNANCY: Status: RESOLVED | Noted: 2022-04-26 | Resolved: 2024-09-24

## 2024-09-24 PROCEDURE — 99395 PREV VISIT EST AGE 18-39: CPT | Performed by: OBSTETRICS & GYNECOLOGY

## 2024-09-24 RX ORDER — MEDROXYPROGESTERONE ACETATE 150 MG/ML
INJECTION, SUSPENSION INTRAMUSCULAR
Qty: 1 ML | Refills: 3 | Status: SHIPPED | OUTPATIENT
Start: 2024-09-24

## 2024-09-24 RX ORDER — CLOBETASOL PROPIONATE 0.5 MG/G
OINTMENT TOPICAL
COMMUNITY
Start: 2024-08-09

## 2024-09-24 RX ORDER — VALACYCLOVIR HYDROCHLORIDE 500 MG/1
500 TABLET, FILM COATED ORAL 2 TIMES DAILY
Qty: 6 TABLET | Refills: 4 | Status: SHIPPED | OUTPATIENT
Start: 2024-09-24 | End: 2024-09-27

## 2024-09-24 NOTE — PATIENT INSTRUCTIONS
Pap every3- 5 years if normal, sexually transmitted infection testing as indicated, exercise most days of week, obtain appropriate diet and hydration, Calcium 1000mg + 600 vit D daily, birth control as directed (ACHES reviewed). Benefits, risks and alternatives discussed/reviewed. HPV 9 vaccine recommended through age 45. Check with your insurance for coverage. If covered, call office to schedule start of vaccine series.  Annual mammogram starting at age 40, monthly breast self exam. Kegels 20 times twice daily.

## 2024-09-27 DIAGNOSIS — B37.9 CANDIDA INFECTION: Primary | ICD-10-CM

## 2024-09-27 LAB
A VAGINAE DNA VAG QL NAA+PROBE: ABNORMAL SCORE
BVAB2 DNA VAG QL NAA+PROBE: ABNORMAL SCORE
C ALBICANS DNA VAG QL NAA+PROBE: POSITIVE
C GLABRATA DNA VAG QL NAA+PROBE: NEGATIVE
C TRACH RRNA SPEC QL NAA+PROBE: NEGATIVE
MEGA1 DNA VAG QL NAA+PROBE: ABNORMAL SCORE
N GONORRHOEA RRNA SPEC QL NAA+PROBE: NEGATIVE
T VAGINALIS RRNA SPEC QL NAA+PROBE: NEGATIVE

## 2024-09-27 RX ORDER — MICONAZOLE NITRATE 2 %
1 CREAM WITH APPLICATOR VAGINAL
Qty: 45 G | Refills: 1 | Status: SHIPPED | OUTPATIENT
Start: 2024-09-27

## 2024-10-09 ENCOUNTER — HOSPITAL ENCOUNTER (OUTPATIENT)
Dept: ULTRASOUND IMAGING | Facility: HOSPITAL | Age: 39
Discharge: HOME | End: 2024-10-09
Payer: COMMERCIAL

## 2024-10-09 DIAGNOSIS — R10.2 VAGINAL PAIN: ICD-10-CM

## 2024-10-09 PROCEDURE — 76830 TRANSVAGINAL US NON-OB: CPT

## 2024-10-09 PROCEDURE — 76856 US EXAM PELVIC COMPLETE: CPT

## 2024-10-21 ENCOUNTER — CLINICAL SUPPORT (OUTPATIENT)
Dept: OBGYN CLINIC | Facility: CLINIC | Age: 39
End: 2024-10-21
Payer: COMMERCIAL

## 2024-10-21 VITALS
HEIGHT: 64 IN | BODY MASS INDEX: 33.63 KG/M2 | WEIGHT: 197 LBS | SYSTOLIC BLOOD PRESSURE: 136 MMHG | DIASTOLIC BLOOD PRESSURE: 78 MMHG

## 2024-10-21 DIAGNOSIS — Z30.42 ENCOUNTER FOR MANAGEMENT AND INJECTION OF INJECTABLE PROGESTIN CONTRACEPTIVE: Primary | ICD-10-CM

## 2024-10-21 PROCEDURE — 96372 THER/PROPH/DIAG INJ SC/IM: CPT

## 2024-10-21 RX ORDER — MEDROXYPROGESTERONE ACETATE 150 MG/ML
150 INJECTION, SUSPENSION INTRAMUSCULAR ONCE
Status: COMPLETED | OUTPATIENT
Start: 2024-10-21 | End: 2024-10-21

## 2024-10-21 RX ADMIN — MEDROXYPROGESTERONE ACETATE 150 MG: 150 INJECTION, SUSPENSION INTRAMUSCULAR at 12:24

## 2024-10-24 PROBLEM — Z01.419 ENCOUNTER FOR GYNECOLOGICAL EXAMINATION WITHOUT ABNORMAL FINDING: Status: RESOLVED | Noted: 2024-09-24 | Resolved: 2024-10-24

## 2024-10-24 PROBLEM — Z11.3 SCREEN FOR STD (SEXUALLY TRANSMITTED DISEASE): Status: RESOLVED | Noted: 2024-09-24 | Resolved: 2024-10-24

## 2024-12-26 ENCOUNTER — CLINICAL SUPPORT (OUTPATIENT)
Dept: OBGYN CLINIC | Facility: CLINIC | Age: 39
End: 2024-12-26
Payer: COMMERCIAL

## 2024-12-26 VITALS
HEIGHT: 64 IN | DIASTOLIC BLOOD PRESSURE: 82 MMHG | WEIGHT: 193 LBS | BODY MASS INDEX: 32.95 KG/M2 | SYSTOLIC BLOOD PRESSURE: 120 MMHG

## 2024-12-26 DIAGNOSIS — Z30.42 SURVEILLANCE FOR DEPO-PROVERA CONTRACEPTION: Primary | ICD-10-CM

## 2024-12-26 PROCEDURE — 96372 THER/PROPH/DIAG INJ SC/IM: CPT

## 2024-12-26 RX ORDER — MEDROXYPROGESTERONE ACETATE 150 MG/ML
150 INJECTION, SUSPENSION INTRAMUSCULAR ONCE
Status: COMPLETED | OUTPATIENT
Start: 2024-12-26 | End: 2024-12-26

## 2024-12-26 RX ADMIN — MEDROXYPROGESTERONE ACETATE 150 MG: 150 INJECTION, SUSPENSION INTRAMUSCULAR at 14:03

## 2024-12-26 NOTE — PROGRESS NOTES
Depo Injection administered without complications; patient wishes to continue Depo injection; aware she needs to schedule and return in 10 weeks for next injection.

## 2025-03-27 ENCOUNTER — OFFICE VISIT (OUTPATIENT)
Dept: OBGYN CLINIC | Facility: CLINIC | Age: 40
End: 2025-03-27
Payer: COMMERCIAL

## 2025-03-27 VITALS
BODY MASS INDEX: 33.46 KG/M2 | HEIGHT: 64 IN | DIASTOLIC BLOOD PRESSURE: 92 MMHG | SYSTOLIC BLOOD PRESSURE: 136 MMHG | WEIGHT: 196 LBS

## 2025-03-27 DIAGNOSIS — Z11.3 SCREENING EXAMINATION FOR STI: ICD-10-CM

## 2025-03-27 DIAGNOSIS — N89.8 VAGINAL DISCHARGE: Primary | ICD-10-CM

## 2025-03-27 DIAGNOSIS — Z30.42 ENCOUNTER FOR DEPO-PROVERA CONTRACEPTION: ICD-10-CM

## 2025-03-27 LAB
BV WHIFF TEST VAG QL: NEGATIVE
CLUE CELLS SPEC QL WET PREP: NEGATIVE
PH SMN: 4 [PH]
T VAGINALIS VAG QL WET PREP: NEGATIVE
YEAST VAG QL WET PREP: NEGATIVE

## 2025-03-27 PROCEDURE — 99213 OFFICE O/P EST LOW 20 MIN: CPT | Performed by: STUDENT IN AN ORGANIZED HEALTH CARE EDUCATION/TRAINING PROGRAM

## 2025-03-27 PROCEDURE — 96372 THER/PROPH/DIAG INJ SC/IM: CPT | Performed by: STUDENT IN AN ORGANIZED HEALTH CARE EDUCATION/TRAINING PROGRAM

## 2025-03-27 PROCEDURE — 87210 SMEAR WET MOUNT SALINE/INK: CPT | Performed by: STUDENT IN AN ORGANIZED HEALTH CARE EDUCATION/TRAINING PROGRAM

## 2025-03-27 RX ORDER — METHYLPREDNISOLONE 4 MG/1
TABLET ORAL
COMMUNITY
Start: 2025-03-18

## 2025-03-27 RX ORDER — CELECOXIB 200 MG/1
CAPSULE ORAL
COMMUNITY
Start: 2025-03-18

## 2025-03-27 RX ORDER — MEDROXYPROGESTERONE ACETATE 150 MG/ML
150 INJECTION, SUSPENSION INTRAMUSCULAR ONCE
Status: COMPLETED | OUTPATIENT
Start: 2025-03-27 | End: 2025-03-27

## 2025-03-27 RX ORDER — HYDROCORTISONE 25 MG/G
OINTMENT TOPICAL
COMMUNITY

## 2025-03-27 RX ADMIN — MEDROXYPROGESTERONE ACETATE 150 MG: 150 INJECTION, SUSPENSION INTRAMUSCULAR at 13:36

## 2025-03-27 NOTE — PROGRESS NOTES
"Name: Debra Kurtz      : 1985      MRN: [id number]  Encounter Provider: Price Maria MD  Encounter Date: 3/27/2025   Encounter department: [facility] [place]  :  Assessment & Plan  Vaginal discharge  - Normal appearing physiologic discharge noted on exam. Wet mount negative  - Nuswab vaginitis plus collected and sent. Will treat if positive.   Orders:  •  POCT wet mount  •  NuSwab Vaginitis Plus (VG+)    Encounter for Depo-Provera contraception  - DepoProvera administered today.   Orders:  •  medroxyPROGESTERone (DEPO-PROVERA) IM injection 150 mg        History of Present Illness   HPI  Debra Kurtz is a 39 y.o. female who presents for evaluation of discharge and for administration of Depo-Provera for contraception. She reports increased white discharge over the past week. Denies vaginal irritation, itching, odor, bleeding, pelvic pain, or fever. No new sexual partners and no concern for exposure to STI.   History obtained from: patient    Review of Systems   All other systems reviewed and are negative.  Medical History Reviewed by provider this encounter:  Tobacco  Med Hx  Surg Hx  Fam Hx  Soc Hx    .     Objective   /92 (BP Location: Left arm, Patient Position: Sitting, Cuff Size: Standard)   Ht 5' 4\" (1.626 m)   Wt 88.9 kg (196 lb)   BMI 33.64 kg/m²      Physical Exam  Vitals reviewed. Exam conducted with a chaperone present (Ann Marie Chamberlain MA).   Constitutional:       Appearance: Normal appearance.   Cardiovascular:      Rate and Rhythm: Normal rate.   Pulmonary:      Effort: Pulmonary effort is normal.   Genitourinary:     Exam position: Lithotomy position.      Labia:         Right: No rash or tenderness.         Left: No rash or tenderness.       Vagina: Normal. No vaginal discharge, erythema, tenderness, bleeding, lesions or prolapsed vaginal walls.      Cervix: Normal. No cervical motion tenderness, discharge, friability, lesion, " erythema or cervical bleeding.      Uterus: Normal.       Adnexa: Right adnexa normal and left adnexa normal.        Right: No mass, tenderness or fullness.          Left: No mass, tenderness or fullness.     Neurological:      General: No focal deficit present.      Mental Status: She is alert and oriented to person, place, and time.   Psychiatric:         Mood and Affect: Mood normal.         Behavior: Behavior normal.         Thought Content: Thought content normal.         Judgment: Judgment normal.

## 2025-03-31 ENCOUNTER — RESULTS FOLLOW-UP (OUTPATIENT)
Dept: OBGYN CLINIC | Facility: CLINIC | Age: 40
End: 2025-03-31

## 2025-03-31 DIAGNOSIS — B37.31 YEAST VAGINITIS: Primary | ICD-10-CM

## 2025-04-01 RX ORDER — FLUCONAZOLE 150 MG/1
150 TABLET ORAL EVERY OTHER DAY
Qty: 2 TABLET | Refills: 0 | Status: SHIPPED | OUTPATIENT
Start: 2025-04-01 | End: 2025-04-04

## 2025-06-17 ENCOUNTER — TELEPHONE (OUTPATIENT)
Age: 40
End: 2025-06-17

## 2025-06-17 NOTE — TELEPHONE ENCOUNTER
Called [facility] where original rx was sent to see about filling rx for patient injection for tomorrow, pharmacy informed that rx was transferred to another pharmacy. Left VM with patient informing her I tried calling [facility] to see about her refill and was told rx was transferred to another pharmacy and if she is still having trouble getting her rx filled for tomorrows appt to please call the office.

## 2025-06-17 NOTE — TELEPHONE ENCOUNTER
Patient called stating that she is supposed to be coming in 6/18/25 for her Depo injection. When patient called the pharmacy they told her that she cannot  the depo until 6/30 because ins wont cover it before hand. Looks like the patient's window is 6/12-6/26. Please follow up

## 2025-06-18 ENCOUNTER — CLINICAL SUPPORT (OUTPATIENT)
Dept: OBGYN CLINIC | Facility: CLINIC | Age: 40
End: 2025-06-18
Payer: COMMERCIAL

## 2025-06-18 VITALS
WEIGHT: 189 LBS | SYSTOLIC BLOOD PRESSURE: 114 MMHG | DIASTOLIC BLOOD PRESSURE: 74 MMHG | HEIGHT: 64 IN | BODY MASS INDEX: 32.27 KG/M2

## 2025-06-18 DIAGNOSIS — Z78.9 USES DEPO-PROVERA AS PRIMARY BIRTH CONTROL METHOD: Primary | ICD-10-CM

## 2025-06-18 PROCEDURE — 96372 THER/PROPH/DIAG INJ SC/IM: CPT

## 2025-06-18 RX ORDER — PANTOPRAZOLE SODIUM 40 MG/1
40 TABLET, DELAYED RELEASE ORAL DAILY
COMMUNITY
Start: 2025-01-30 | End: 2026-01-30

## 2025-06-18 RX ORDER — DICLOFENAC SODIUM 30 MG/G
GEL TOPICAL
COMMUNITY
Start: 2025-05-30

## 2025-06-18 RX ORDER — CHOLECALCIFEROL (VITAMIN D3) 50 MCG
1 TABLET ORAL DAILY
COMMUNITY
Start: 2025-06-13

## 2025-06-18 RX ORDER — SEMAGLUTIDE 0.68 MG/ML
INJECTION, SOLUTION SUBCUTANEOUS
COMMUNITY
Start: 2025-06-18

## 2025-06-18 RX ORDER — MEDROXYPROGESTERONE ACETATE 150 MG/ML
150 INJECTION, SUSPENSION INTRAMUSCULAR ONCE
Status: COMPLETED | OUTPATIENT
Start: 2025-06-18 | End: 2025-06-18

## 2025-06-18 RX ORDER — TRAZODONE HYDROCHLORIDE 50 MG/1
TABLET ORAL
COMMUNITY
Start: 2025-06-06

## 2025-06-18 RX ADMIN — MEDROXYPROGESTERONE ACETATE 150 MG: 150 INJECTION, SUSPENSION INTRAMUSCULAR at 17:05

## 2025-06-18 NOTE — PROGRESS NOTES
Depo injection administered without complications; patient wishes to continue depo injection, aware to schedule and return in 10 weeks for next injection.

## 2025-07-23 ENCOUNTER — TELEPHONE (OUTPATIENT)
Age: 40
End: 2025-07-23

## 2025-07-23 NOTE — TELEPHONE ENCOUNTER
PA Ozempic 0.25 or 0.5 MG/DOSE SUBMITTED    to [facility]      via    [x]CMM-KEY: [id number]  []Surescripts-Case ID #    []Availity-Auth ID #  NDC #    []Faxed to plan   []Other website    []Phone call Case ID #      []PA sent as URGENT    All office notes, labs and other pertaining documents and studies sent. Clinical questions answered. Awaiting determination from insurance company.     Turnaround time for your insurance to make a decision on your Prior Authorization can take 7-21 business days.

## 2025-07-23 NOTE — TELEPHONE ENCOUNTER
On 07/21/25 @ 16:15 Indy Yu Wrote To uYmiko Multani (3)   Sorry I am not sure who to send these to today-Patient is stating that the Ozempic needs prior authorization, pls process.

## 2025-08-20 ENCOUNTER — TELEPHONE (OUTPATIENT)
Age: 40
End: 2025-08-20

## 2025-08-27 PROBLEM — F32.A DEPRESSION, UNSPECIFIED: Status: ACTIVE | Noted: 2025-08-27

## 2025-08-27 PROBLEM — G47.00 INSOMNIA, UNSPECIFIED: Status: ACTIVE | Noted: 2025-08-27

## 2025-08-27 PROBLEM — E11.9 DIABETES MELLITUS (HCC): Status: ACTIVE | Noted: 2025-08-27

## 2025-08-27 PROBLEM — F41.9 ANXIETY DISORDER, UNSPECIFIED: Status: ACTIVE | Noted: 2025-08-27
